# Patient Record
Sex: FEMALE | Race: WHITE | NOT HISPANIC OR LATINO | URBAN - METROPOLITAN AREA
[De-identification: names, ages, dates, MRNs, and addresses within clinical notes are randomized per-mention and may not be internally consistent; named-entity substitution may affect disease eponyms.]

---

## 2017-01-30 ENCOUNTER — OUTPATIENT (OUTPATIENT)
Dept: OUTPATIENT SERVICES | Facility: HOSPITAL | Age: 67
LOS: 1 days | End: 2017-01-30
Payer: MEDICARE

## 2017-01-30 ENCOUNTER — APPOINTMENT (OUTPATIENT)
Dept: RADIOLOGY | Facility: CLINIC | Age: 67
End: 2017-01-30

## 2017-01-30 DIAGNOSIS — Z00.8 ENCOUNTER FOR OTHER GENERAL EXAMINATION: ICD-10-CM

## 2017-01-30 PROCEDURE — 71020: CPT | Mod: 26

## 2017-01-30 PROCEDURE — 71046 X-RAY EXAM CHEST 2 VIEWS: CPT

## 2017-03-10 ENCOUNTER — APPOINTMENT (OUTPATIENT)
Dept: RADIOLOGY | Facility: CLINIC | Age: 67
End: 2017-03-10

## 2017-03-10 ENCOUNTER — OUTPATIENT (OUTPATIENT)
Dept: OUTPATIENT SERVICES | Facility: HOSPITAL | Age: 67
LOS: 1 days | End: 2017-03-10
Payer: MEDICARE

## 2017-03-10 DIAGNOSIS — Z00.8 ENCOUNTER FOR OTHER GENERAL EXAMINATION: ICD-10-CM

## 2017-03-10 PROCEDURE — 73030 X-RAY EXAM OF SHOULDER: CPT | Mod: 26,LT

## 2017-03-10 PROCEDURE — 73030 X-RAY EXAM OF SHOULDER: CPT

## 2017-09-12 ENCOUNTER — APPOINTMENT (OUTPATIENT)
Dept: GASTROENTEROLOGY | Facility: CLINIC | Age: 67
End: 2017-09-12
Payer: MEDICARE

## 2017-09-12 VITALS
SYSTOLIC BLOOD PRESSURE: 132 MMHG | RESPIRATION RATE: 16 BRPM | HEIGHT: 62 IN | BODY MASS INDEX: 30.73 KG/M2 | OXYGEN SATURATION: 100 % | WEIGHT: 167 LBS | HEART RATE: 77 BPM | DIASTOLIC BLOOD PRESSURE: 81 MMHG

## 2017-09-12 PROCEDURE — 99214 OFFICE O/P EST MOD 30 MIN: CPT

## 2017-10-01 ENCOUNTER — TRANSCRIPTION ENCOUNTER (OUTPATIENT)
Age: 67
End: 2017-10-01

## 2017-11-15 ENCOUNTER — APPOINTMENT (OUTPATIENT)
Dept: GASTROENTEROLOGY | Facility: CLINIC | Age: 67
End: 2017-11-15
Payer: MEDICARE

## 2017-11-15 VITALS
BODY MASS INDEX: 30.73 KG/M2 | HEIGHT: 62 IN | RESPIRATION RATE: 16 BRPM | SYSTOLIC BLOOD PRESSURE: 127 MMHG | WEIGHT: 167 LBS | HEART RATE: 74 BPM | DIASTOLIC BLOOD PRESSURE: 73 MMHG

## 2017-11-15 PROCEDURE — 99214 OFFICE O/P EST MOD 30 MIN: CPT

## 2017-12-14 ENCOUNTER — APPOINTMENT (OUTPATIENT)
Dept: CT IMAGING | Facility: CLINIC | Age: 67
End: 2017-12-14
Payer: MEDICARE

## 2017-12-14 ENCOUNTER — OUTPATIENT (OUTPATIENT)
Dept: OUTPATIENT SERVICES | Facility: HOSPITAL | Age: 67
LOS: 1 days | End: 2017-12-14
Payer: MEDICARE

## 2017-12-14 DIAGNOSIS — R10.13 EPIGASTRIC PAIN: ICD-10-CM

## 2017-12-14 PROCEDURE — 74177 CT ABD & PELVIS W/CONTRAST: CPT | Mod: 26

## 2017-12-14 PROCEDURE — 74177 CT ABD & PELVIS W/CONTRAST: CPT

## 2017-12-15 LAB
ALBUMIN SERPL ELPH-MCNC: 4.2 G/DL
ALP BLD-CCNC: 95 U/L
ALT SERPL-CCNC: 18 U/L
AMYLASE/CREAT SERPL: 59 U/L
ANION GAP SERPL CALC-SCNC: 13 MMOL/L
AST SERPL-CCNC: 23 U/L
BASOPHILS # BLD AUTO: 0.04 K/UL
BASOPHILS NFR BLD AUTO: 0.5 %
BILIRUB SERPL-MCNC: 0.4 MG/DL
BUN SERPL-MCNC: 15 MG/DL
CALCIUM SERPL-MCNC: 9.8 MG/DL
CHLORIDE SERPL-SCNC: 96 MMOL/L
CO2 SERPL-SCNC: 30 MMOL/L
CREAT SERPL-MCNC: 0.94 MG/DL
ENDOMYSIUM IGA SER QL: NEGATIVE
ENDOMYSIUM IGA TITR SER: NORMAL
EOSINOPHIL # BLD AUTO: 0.22 K/UL
EOSINOPHIL NFR BLD AUTO: 2.5 %
GLIADIN IGA SER QL: <5 UNITS
GLIADIN IGG SER QL: <5 UNITS
GLIADIN PEPTIDE IGA SER-ACNC: NEGATIVE
GLIADIN PEPTIDE IGG SER-ACNC: NEGATIVE
GLUCOSE SERPL-MCNC: 101 MG/DL
HCT VFR BLD CALC: 40.6 %
HGB BLD-MCNC: 12.9 G/DL
IGA SER QL IEP: 319 MG/DL
IMM GRANULOCYTES NFR BLD AUTO: 0.1 %
INR PPP: 0.95 RATIO
LPL SERPL-CCNC: 39 U/L
LYMPHOCYTES # BLD AUTO: 2.14 K/UL
LYMPHOCYTES NFR BLD AUTO: 24.5 %
MAN DIFF?: NORMAL
MCHC RBC-ENTMCNC: 29.5 PG
MCHC RBC-ENTMCNC: 31.8 GM/DL
MCV RBC AUTO: 92.9 FL
MONOCYTES # BLD AUTO: 0.83 K/UL
MONOCYTES NFR BLD AUTO: 9.5 %
NEUTROPHILS # BLD AUTO: 5.51 K/UL
NEUTROPHILS NFR BLD AUTO: 62.9 %
PLATELET # BLD AUTO: 247 K/UL
POTASSIUM SERPL-SCNC: 4.9 MMOL/L
PROT SERPL-MCNC: 7.5 G/DL
PT BLD: 10.7 SEC
RBC # BLD: 4.37 M/UL
RBC # FLD: 13.9 %
SODIUM SERPL-SCNC: 139 MMOL/L
TTG IGA SER IA-ACNC: 6.3 UNITS
TTG IGA SER-ACNC: NEGATIVE
TTG IGG SER IA-ACNC: 5.2 UNITS
TTG IGG SER IA-ACNC: NEGATIVE
WBC # FLD AUTO: 8.75 K/UL

## 2018-01-02 ENCOUNTER — APPOINTMENT (OUTPATIENT)
Dept: GASTROENTEROLOGY | Facility: GI CENTER | Age: 68
End: 2018-01-02
Payer: MEDICARE

## 2018-01-02 ENCOUNTER — OUTPATIENT (OUTPATIENT)
Dept: OUTPATIENT SERVICES | Facility: HOSPITAL | Age: 68
LOS: 1 days | End: 2018-01-02
Payer: MEDICARE

## 2018-01-02 ENCOUNTER — RESULT REVIEW (OUTPATIENT)
Age: 68
End: 2018-01-02

## 2018-01-02 DIAGNOSIS — R10.13 EPIGASTRIC PAIN: ICD-10-CM

## 2018-01-02 DIAGNOSIS — G89.29 EPIGASTRIC PAIN: ICD-10-CM

## 2018-01-02 DIAGNOSIS — K22.70 BARRETT'S ESOPHAGUS WITHOUT DYSPLASIA: ICD-10-CM

## 2018-01-02 PROCEDURE — 88305 TISSUE EXAM BY PATHOLOGIST: CPT | Mod: 26

## 2018-01-02 PROCEDURE — 43239 EGD BIOPSY SINGLE/MULTIPLE: CPT

## 2018-01-02 PROCEDURE — 88305 TISSUE EXAM BY PATHOLOGIST: CPT

## 2018-01-04 LAB — SURGICAL PATHOLOGY FINAL REPORT - CH: SIGNIFICANT CHANGE UP

## 2018-02-01 ENCOUNTER — APPOINTMENT (OUTPATIENT)
Dept: PULMONOLOGY | Facility: CLINIC | Age: 68
End: 2018-02-01
Payer: MEDICARE

## 2018-02-01 VITALS
SYSTOLIC BLOOD PRESSURE: 110 MMHG | HEART RATE: 81 BPM | BODY MASS INDEX: 30.54 KG/M2 | WEIGHT: 167 LBS | DIASTOLIC BLOOD PRESSURE: 80 MMHG | OXYGEN SATURATION: 97 %

## 2018-02-01 DIAGNOSIS — G93.3 POSTVIRAL FATIGUE SYNDROME: ICD-10-CM

## 2018-02-01 PROCEDURE — 94060 EVALUATION OF WHEEZING: CPT

## 2018-02-01 PROCEDURE — 99215 OFFICE O/P EST HI 40 MIN: CPT | Mod: 25

## 2018-02-01 PROCEDURE — 94664 DEMO&/EVAL PT USE INHALER: CPT | Mod: 59

## 2018-02-01 RX ORDER — RANITIDINE 150 MG/1
150 TABLET ORAL
Qty: 30 | Refills: 5 | Status: DISCONTINUED | COMMUNITY
Start: 2017-09-12 | End: 2018-02-01

## 2018-02-19 ENCOUNTER — FORM ENCOUNTER (OUTPATIENT)
Age: 68
End: 2018-02-19

## 2018-02-20 ENCOUNTER — OUTPATIENT (OUTPATIENT)
Dept: OUTPATIENT SERVICES | Facility: HOSPITAL | Age: 68
LOS: 1 days | End: 2018-02-20
Payer: MEDICARE

## 2018-02-20 ENCOUNTER — APPOINTMENT (OUTPATIENT)
Dept: CT IMAGING | Facility: CLINIC | Age: 68
End: 2018-02-20
Payer: MEDICARE

## 2018-02-20 DIAGNOSIS — Z00.8 ENCOUNTER FOR OTHER GENERAL EXAMINATION: ICD-10-CM

## 2018-02-20 DIAGNOSIS — R91.8 OTHER NONSPECIFIC ABNORMAL FINDING OF LUNG FIELD: ICD-10-CM

## 2018-02-20 PROCEDURE — G0297: CPT

## 2018-02-20 PROCEDURE — G0297: CPT | Mod: 26

## 2018-02-27 ENCOUNTER — APPOINTMENT (OUTPATIENT)
Dept: PULMONOLOGY | Facility: CLINIC | Age: 68
End: 2018-02-27
Payer: MEDICARE

## 2018-02-27 VITALS
RESPIRATION RATE: 16 BRPM | SYSTOLIC BLOOD PRESSURE: 148 MMHG | HEART RATE: 88 BPM | DIASTOLIC BLOOD PRESSURE: 78 MMHG | OXYGEN SATURATION: 97 %

## 2018-02-27 VITALS — WEIGHT: 164 LBS | BODY MASS INDEX: 30 KG/M2

## 2018-02-27 PROCEDURE — 94010 BREATHING CAPACITY TEST: CPT

## 2018-02-27 PROCEDURE — 99214 OFFICE O/P EST MOD 30 MIN: CPT | Mod: 25

## 2018-04-09 ENCOUNTER — APPOINTMENT (OUTPATIENT)
Dept: PULMONOLOGY | Facility: CLINIC | Age: 68
End: 2018-04-09
Payer: MEDICARE

## 2018-04-09 VITALS
HEIGHT: 62 IN | OXYGEN SATURATION: 98 % | SYSTOLIC BLOOD PRESSURE: 130 MMHG | DIASTOLIC BLOOD PRESSURE: 70 MMHG | BODY MASS INDEX: 30.18 KG/M2 | HEART RATE: 84 BPM

## 2018-04-09 VITALS — WEIGHT: 165 LBS | BODY MASS INDEX: 30.18 KG/M2

## 2018-04-09 DIAGNOSIS — C50.912 MALIGNANT NEOPLASM OF UNSPECIFIED SITE OF LEFT FEMALE BREAST: ICD-10-CM

## 2018-04-09 PROCEDURE — 94060 EVALUATION OF WHEEZING: CPT

## 2018-04-09 PROCEDURE — 94664 DEMO&/EVAL PT USE INHALER: CPT | Mod: 59

## 2018-04-09 PROCEDURE — 99214 OFFICE O/P EST MOD 30 MIN: CPT | Mod: 25

## 2018-04-10 ENCOUNTER — FORM ENCOUNTER (OUTPATIENT)
Age: 68
End: 2018-04-10

## 2018-04-11 ENCOUNTER — APPOINTMENT (OUTPATIENT)
Dept: CT IMAGING | Facility: CLINIC | Age: 68
End: 2018-04-11
Payer: MEDICARE

## 2018-04-11 ENCOUNTER — OUTPATIENT (OUTPATIENT)
Dept: OUTPATIENT SERVICES | Facility: HOSPITAL | Age: 68
LOS: 1 days | End: 2018-04-11
Payer: MEDICARE

## 2018-04-11 DIAGNOSIS — R06.09 OTHER FORMS OF DYSPNEA: ICD-10-CM

## 2018-04-11 DIAGNOSIS — R91.8 OTHER NONSPECIFIC ABNORMAL FINDING OF LUNG FIELD: ICD-10-CM

## 2018-04-11 PROCEDURE — 71275 CT ANGIOGRAPHY CHEST: CPT

## 2018-04-11 PROCEDURE — 71275 CT ANGIOGRAPHY CHEST: CPT | Mod: 26

## 2018-07-26 ENCOUNTER — APPOINTMENT (OUTPATIENT)
Dept: PULMONOLOGY | Facility: CLINIC | Age: 68
End: 2018-07-26

## 2018-10-02 ENCOUNTER — RX RENEWAL (OUTPATIENT)
Age: 68
End: 2018-10-02

## 2018-11-07 ENCOUNTER — APPOINTMENT (OUTPATIENT)
Dept: PULMONOLOGY | Facility: CLINIC | Age: 68
End: 2018-11-07
Payer: MEDICARE

## 2018-11-07 VITALS — OXYGEN SATURATION: 95 % | SYSTOLIC BLOOD PRESSURE: 120 MMHG | DIASTOLIC BLOOD PRESSURE: 70 MMHG | HEART RATE: 73 BPM

## 2018-11-07 PROCEDURE — 94664 DEMO&/EVAL PT USE INHALER: CPT | Mod: 59

## 2018-11-07 PROCEDURE — 94060 EVALUATION OF WHEEZING: CPT

## 2018-11-07 PROCEDURE — 99215 OFFICE O/P EST HI 40 MIN: CPT | Mod: 25

## 2018-11-29 ENCOUNTER — APPOINTMENT (OUTPATIENT)
Dept: GASTROENTEROLOGY | Facility: CLINIC | Age: 68
End: 2018-11-29

## 2018-12-18 ENCOUNTER — APPOINTMENT (OUTPATIENT)
Dept: PULMONOLOGY | Facility: CLINIC | Age: 68
End: 2018-12-18
Payer: MEDICARE

## 2018-12-18 VITALS — SYSTOLIC BLOOD PRESSURE: 132 MMHG | DIASTOLIC BLOOD PRESSURE: 70 MMHG | OXYGEN SATURATION: 97 % | HEART RATE: 68 BPM

## 2018-12-18 DIAGNOSIS — K22.719 BARRETT'S ESOPHAGUS WITH DYSPLASIA, UNSPECIFIED: ICD-10-CM

## 2018-12-18 DIAGNOSIS — Z87.891 PERSONAL HISTORY OF NICOTINE DEPENDENCE: ICD-10-CM

## 2018-12-18 DIAGNOSIS — J44.1 CHRONIC OBSTRUCTIVE PULMONARY DISEASE WITH (ACUTE) EXACERBATION: ICD-10-CM

## 2018-12-18 DIAGNOSIS — R91.8 OTHER NONSPECIFIC ABNORMAL FINDING OF LUNG FIELD: ICD-10-CM

## 2018-12-18 PROCEDURE — 99214 OFFICE O/P EST MOD 30 MIN: CPT | Mod: 25

## 2018-12-18 PROCEDURE — 94010 BREATHING CAPACITY TEST: CPT

## 2018-12-18 RX ORDER — TIOTROPIUM BROMIDE INHALATION SPRAY 3.12 UG/1
2.5 SPRAY, METERED RESPIRATORY (INHALATION)
Refills: 0 | Status: DISCONTINUED | COMMUNITY
End: 2018-12-18

## 2019-01-01 ENCOUNTER — APPOINTMENT (OUTPATIENT)
Dept: FAMILY MEDICINE | Facility: CLINIC | Age: 69
End: 2019-01-01
Payer: MEDICARE

## 2019-01-01 ENCOUNTER — EMERGENCY (EMERGENCY)
Facility: HOSPITAL | Age: 69
LOS: 1 days | Discharge: HOME | End: 2019-01-01
Attending: EMERGENCY MEDICINE
Payer: MEDICARE

## 2019-01-01 ENCOUNTER — APPOINTMENT (OUTPATIENT)
Dept: PULMONOLOGY | Facility: CLINIC | Age: 69
End: 2019-01-01
Payer: MEDICARE

## 2019-01-01 VITALS
OXYGEN SATURATION: 99 % | RESPIRATION RATE: 20 BRPM | HEART RATE: 80 BPM | SYSTOLIC BLOOD PRESSURE: 152 MMHG | WEIGHT: 138.89 LBS | HEIGHT: 62 IN | DIASTOLIC BLOOD PRESSURE: 82 MMHG | TEMPERATURE: 97 F

## 2019-01-01 VITALS
OXYGEN SATURATION: 98 % | HEIGHT: 62 IN | DIASTOLIC BLOOD PRESSURE: 62 MMHG | HEART RATE: 97 BPM | DIASTOLIC BLOOD PRESSURE: 82 MMHG | OXYGEN SATURATION: 95 % | SYSTOLIC BLOOD PRESSURE: 144 MMHG | TEMPERATURE: 97.7 F | RESPIRATION RATE: 16 BRPM | SYSTOLIC BLOOD PRESSURE: 116 MMHG | WEIGHT: 130 LBS | BODY MASS INDEX: 23.78 KG/M2 | HEART RATE: 78 BPM | BODY MASS INDEX: 24.29 KG/M2 | WEIGHT: 132 LBS

## 2019-01-01 VITALS
OXYGEN SATURATION: 98 % | HEIGHT: 62 IN | SYSTOLIC BLOOD PRESSURE: 152 MMHG | DIASTOLIC BLOOD PRESSURE: 81 MMHG | TEMPERATURE: 97.9 F | WEIGHT: 128 LBS | HEART RATE: 77 BPM | BODY MASS INDEX: 23.55 KG/M2

## 2019-01-01 DIAGNOSIS — Z87.898 PERSONAL HISTORY OF OTHER SPECIFIED CONDITIONS: ICD-10-CM

## 2019-01-01 DIAGNOSIS — Z01.818 ENCOUNTER FOR OTHER PREPROCEDURAL EXAMINATION: ICD-10-CM

## 2019-01-01 DIAGNOSIS — Z96.82 PRESENCE OF NEUROSTIMULATOR: ICD-10-CM

## 2019-01-01 PROCEDURE — 99283 EMERGENCY DEPT VISIT LOW MDM: CPT

## 2019-01-01 PROCEDURE — 99284 EMERGENCY DEPT VISIT MOD MDM: CPT | Mod: 25

## 2019-01-01 PROCEDURE — 96372 THER/PROPH/DIAG INJ SC/IM: CPT

## 2019-01-01 PROCEDURE — 99213 OFFICE O/P EST LOW 20 MIN: CPT

## 2019-01-01 PROCEDURE — 71101 X-RAY EXAM UNILAT RIBS/CHEST: CPT | Mod: 26

## 2019-01-01 PROCEDURE — 71101 X-RAY EXAM UNILAT RIBS/CHEST: CPT

## 2019-01-01 PROCEDURE — 99214 OFFICE O/P EST MOD 30 MIN: CPT

## 2019-01-01 RX ORDER — OXYCODONE AND ACETAMINOPHEN 5; 325 MG/1; MG/1
5-325 TABLET ORAL
Qty: 30 | Refills: 0 | Status: DISCONTINUED | COMMUNITY
Start: 2019-01-01 | End: 2019-01-01

## 2019-01-01 RX ORDER — OXYCODONE AND ACETAMINOPHEN 5; 325 MG/1; MG/1
1 TABLET ORAL ONCE
Refills: 0 | Status: DISCONTINUED | OUTPATIENT
Start: 2019-01-01 | End: 2019-01-01

## 2019-01-01 RX ORDER — MORPHINE SULFATE 50 MG/1
4 CAPSULE, EXTENDED RELEASE ORAL ONCE
Refills: 0 | Status: DISCONTINUED | OUTPATIENT
Start: 2019-01-01 | End: 2019-01-01

## 2019-01-01 RX ORDER — LIDOCAINE 4 G/100G
1 CREAM TOPICAL ONCE
Refills: 0 | Status: COMPLETED | OUTPATIENT
Start: 2019-01-01 | End: 2019-01-01

## 2019-01-01 RX ORDER — FLUTICASONE FUROATE AND VILANTEROL TRIFENATATE 100; 25 UG/1; UG/1
100-25 POWDER RESPIRATORY (INHALATION)
Qty: 3 | Refills: 1 | Status: DISCONTINUED | COMMUNITY
Start: 2018-02-27 | End: 2019-01-01

## 2019-01-01 RX ORDER — APIXABAN 5 MG/1
5 TABLET, FILM COATED ORAL
Qty: 60 | Refills: 3 | Status: ACTIVE | COMMUNITY
Start: 2019-01-01

## 2019-01-01 RX ADMIN — MORPHINE SULFATE 4 MILLIGRAM(S): 50 CAPSULE, EXTENDED RELEASE ORAL at 16:23

## 2019-01-01 RX ADMIN — LIDOCAINE 1 PATCH: 4 CREAM TOPICAL at 16:24

## 2019-01-01 RX ADMIN — OXYCODONE AND ACETAMINOPHEN 1 TABLET(S): 5; 325 TABLET ORAL at 18:13

## 2019-04-13 ENCOUNTER — EMERGENCY (EMERGENCY)
Facility: HOSPITAL | Age: 69
LOS: 1 days | Discharge: HOME | End: 2019-04-13
Attending: EMERGENCY MEDICINE
Payer: MEDICARE

## 2019-04-13 VITALS
SYSTOLIC BLOOD PRESSURE: 164 MMHG | DIASTOLIC BLOOD PRESSURE: 94 MMHG | OXYGEN SATURATION: 98 % | TEMPERATURE: 98 F | HEART RATE: 98 BPM | RESPIRATION RATE: 20 BRPM

## 2019-04-13 LAB
ALBUMIN SERPL ELPH-MCNC: 4.3 G/DL — SIGNIFICANT CHANGE UP (ref 3.3–5.2)
ALP SERPL-CCNC: 92 U/L — SIGNIFICANT CHANGE UP (ref 40–120)
ALT FLD-CCNC: 22 U/L — SIGNIFICANT CHANGE UP
ANION GAP SERPL CALC-SCNC: 13 MMOL/L — SIGNIFICANT CHANGE UP (ref 5–17)
APPEARANCE UR: CLEAR — SIGNIFICANT CHANGE UP
AST SERPL-CCNC: 31 U/L — SIGNIFICANT CHANGE UP
BACTERIA # UR AUTO: ABNORMAL
BASOPHILS # BLD AUTO: 0 K/UL — SIGNIFICANT CHANGE UP (ref 0–0.2)
BASOPHILS NFR BLD AUTO: 0.5 % — SIGNIFICANT CHANGE UP (ref 0–2)
BILIRUB SERPL-MCNC: 0.3 MG/DL — LOW (ref 0.4–2)
BILIRUB UR-MCNC: NEGATIVE — SIGNIFICANT CHANGE UP
BUN SERPL-MCNC: 12 MG/DL — SIGNIFICANT CHANGE UP (ref 8–20)
CALCIUM SERPL-MCNC: 9.5 MG/DL — SIGNIFICANT CHANGE UP (ref 8.6–10.2)
CHLORIDE SERPL-SCNC: 105 MMOL/L — SIGNIFICANT CHANGE UP (ref 98–107)
CO2 SERPL-SCNC: 25 MMOL/L — SIGNIFICANT CHANGE UP (ref 22–29)
COLOR SPEC: YELLOW — SIGNIFICANT CHANGE UP
CREAT SERPL-MCNC: 0.87 MG/DL — SIGNIFICANT CHANGE UP (ref 0.5–1.3)
DIFF PNL FLD: NEGATIVE — SIGNIFICANT CHANGE UP
EOSINOPHIL # BLD AUTO: 0.2 K/UL — SIGNIFICANT CHANGE UP (ref 0–0.5)
EOSINOPHIL NFR BLD AUTO: 1.6 % — SIGNIFICANT CHANGE UP (ref 0–6)
EPI CELLS # UR: SIGNIFICANT CHANGE UP
GLUCOSE SERPL-MCNC: 97 MG/DL — SIGNIFICANT CHANGE UP (ref 70–115)
GLUCOSE UR QL: NEGATIVE MG/DL — SIGNIFICANT CHANGE UP
HCT VFR BLD CALC: 42.9 % — SIGNIFICANT CHANGE UP (ref 37–47)
HGB BLD-MCNC: 13.6 G/DL — SIGNIFICANT CHANGE UP (ref 12–16)
KETONES UR-MCNC: NEGATIVE — SIGNIFICANT CHANGE UP
LEUKOCYTE ESTERASE UR-ACNC: ABNORMAL
LIDOCAIN IGE QN: 38 U/L — SIGNIFICANT CHANGE UP (ref 22–51)
LYMPHOCYTES # BLD AUTO: 2.8 K/UL — SIGNIFICANT CHANGE UP (ref 1–4.8)
LYMPHOCYTES # BLD AUTO: 25.5 % — SIGNIFICANT CHANGE UP (ref 20–55)
MCHC RBC-ENTMCNC: 29 PG — SIGNIFICANT CHANGE UP (ref 27–31)
MCHC RBC-ENTMCNC: 31.7 G/DL — LOW (ref 32–36)
MCV RBC AUTO: 91.5 FL — SIGNIFICANT CHANGE UP (ref 81–99)
MONOCYTES # BLD AUTO: 0.8 K/UL — SIGNIFICANT CHANGE UP (ref 0–0.8)
MONOCYTES NFR BLD AUTO: 6.9 % — SIGNIFICANT CHANGE UP (ref 3–10)
NEUTROPHILS # BLD AUTO: 7.2 K/UL — SIGNIFICANT CHANGE UP (ref 1.8–8)
NEUTROPHILS NFR BLD AUTO: 65.2 % — SIGNIFICANT CHANGE UP (ref 37–73)
NITRITE UR-MCNC: NEGATIVE — SIGNIFICANT CHANGE UP
PH UR: 7 — SIGNIFICANT CHANGE UP (ref 5–8)
PLATELET # BLD AUTO: 262 K/UL — SIGNIFICANT CHANGE UP (ref 150–400)
POTASSIUM SERPL-MCNC: 4.7 MMOL/L — SIGNIFICANT CHANGE UP (ref 3.5–5.3)
POTASSIUM SERPL-SCNC: 4.7 MMOL/L — SIGNIFICANT CHANGE UP (ref 3.5–5.3)
PROT SERPL-MCNC: 7.9 G/DL — SIGNIFICANT CHANGE UP (ref 6.6–8.7)
PROT UR-MCNC: NEGATIVE MG/DL — SIGNIFICANT CHANGE UP
RBC # BLD: 4.69 M/UL — SIGNIFICANT CHANGE UP (ref 4.4–5.2)
RBC # FLD: 13.8 % — SIGNIFICANT CHANGE UP (ref 11–15.6)
RBC CASTS # UR COMP ASSIST: SIGNIFICANT CHANGE UP /HPF (ref 0–4)
SODIUM SERPL-SCNC: 143 MMOL/L — SIGNIFICANT CHANGE UP (ref 135–145)
SP GR SPEC: 1.01 — SIGNIFICANT CHANGE UP (ref 1.01–1.02)
TROPONIN T SERPL-MCNC: <0.01 NG/ML — SIGNIFICANT CHANGE UP (ref 0–0.06)
UROBILINOGEN FLD QL: NEGATIVE MG/DL — SIGNIFICANT CHANGE UP
WBC # BLD: 11.1 K/UL — HIGH (ref 4.8–10.8)
WBC # FLD AUTO: 11.1 K/UL — HIGH (ref 4.8–10.8)
WBC UR QL: ABNORMAL

## 2019-04-13 PROCEDURE — 83690 ASSAY OF LIPASE: CPT

## 2019-04-13 PROCEDURE — 99284 EMERGENCY DEPT VISIT MOD MDM: CPT

## 2019-04-13 PROCEDURE — 80053 COMPREHEN METABOLIC PANEL: CPT

## 2019-04-13 PROCEDURE — 84484 ASSAY OF TROPONIN QUANT: CPT

## 2019-04-13 PROCEDURE — 96374 THER/PROPH/DIAG INJ IV PUSH: CPT

## 2019-04-13 PROCEDURE — 85027 COMPLETE CBC AUTOMATED: CPT

## 2019-04-13 PROCEDURE — 93010 ELECTROCARDIOGRAM REPORT: CPT

## 2019-04-13 PROCEDURE — 99284 EMERGENCY DEPT VISIT MOD MDM: CPT | Mod: 25

## 2019-04-13 PROCEDURE — 36415 COLL VENOUS BLD VENIPUNCTURE: CPT

## 2019-04-13 PROCEDURE — 81001 URINALYSIS AUTO W/SCOPE: CPT

## 2019-04-13 PROCEDURE — 93005 ELECTROCARDIOGRAM TRACING: CPT

## 2019-04-13 PROCEDURE — 87086 URINE CULTURE/COLONY COUNT: CPT

## 2019-04-13 RX ORDER — KETOROLAC TROMETHAMINE 30 MG/ML
30 SYRINGE (ML) INJECTION ONCE
Qty: 0 | Refills: 0 | Status: DISCONTINUED | OUTPATIENT
Start: 2019-04-13 | End: 2019-04-13

## 2019-04-13 RX ORDER — CEPHALEXIN 500 MG
500 CAPSULE ORAL ONCE
Qty: 0 | Refills: 0 | Status: COMPLETED | OUTPATIENT
Start: 2019-04-13 | End: 2019-04-13

## 2019-04-13 RX ORDER — CEPHALEXIN 500 MG
1 CAPSULE ORAL
Qty: 20 | Refills: 0
Start: 2019-04-13 | End: 2019-04-17

## 2019-04-13 RX ADMIN — Medication 30 MILLIGRAM(S): at 20:25

## 2019-04-13 RX ADMIN — Medication 500 MILLIGRAM(S): at 21:40

## 2019-04-13 NOTE — ED ADULT TRIAGE NOTE - CHIEF COMPLAINT QUOTE
Pt c/o epigastric/chest pain x1 week that radiates to abdomen and back, pt reports generalized aches and N/V with decreased appetitie and reports chills and night sweats.

## 2019-04-13 NOTE — ED STATDOCS - CLINICAL SUMMARY MEDICAL DECISION MAKING FREE TEXT BOX
I, Clara Gill, performed the initial face to face bedside interview with this patient regarding history of present illness and determined that the patient should be seen in the main ED.  The history, was documented by the scribe in my presence and I attest to the accuracy of the documentation.

## 2019-04-13 NOTE — ED ADULT NURSE REASSESSMENT NOTE - NS ED NURSE REASSESS COMMENT FT1
patient and family spoke with supervision satisfied with support, and care, discharged home antibiotic given

## 2019-04-13 NOTE — ED PROVIDER NOTE - GASTROINTESTINAL, MLM
Abdomen soft, Tenderness to b/l flank and epigastric regions, no guarding, rebound or rigidity. Abdomen soft, b/l flank tenderness, no rebound, rigidity or guarding.

## 2019-04-13 NOTE — ED ADULT NURSE NOTE - OBJECTIVE STATEMENT
patient with abdominal pain x 1 week with nausea getting worse,  yesterday + BM normal, states today feels full, patient given medication and labs drawn patient given emotional support, patient also has HX of shingles red dots on right lower back without drainage or crusting- closed states pain to site, MD Carrero states no shingles at this time isolation off/ DC

## 2019-04-13 NOTE — ED PROVIDER NOTE - OBJECTIVE STATEMENT
69 y.o F presents with b/l lower flank pain x1 week described as constant, achy, and is mildly aggravated with movement. denies fever. denies HA or neck pain. no chest pain or sob. no abd pain. no n/v/d. no urinary f/u/d. no back pain. no motor or sensory deficits. denies illicit drug use. no recent travel. no rash. no other acute issues symptoms or concerns 69 y.o F with hx of appendectomy and cholecystectomy presents to ED with b/l lower flank pain x1 week described as constant, achy, and is mildly aggravated with movement. denies fever. denies HA or neck pain. no chest pain or sob. no abd pain. no n/v/d. no urinary f/u/d. no back pain. no motor or sensory deficits. denies illicit drug use. no recent travel. no rash. no other acute issues symptoms or concerns

## 2019-04-13 NOTE — ED PROVIDER NOTE - CLINICAL SUMMARY MEDICAL DECISION MAKING FREE TEXT BOX
pain controlled in nad in room no sing shingles lesions on back concerns addressed with nursing manager abx for uti f.u pcp return for intractable pain or any overall worsening. labs porvided to pt and herself and they are in agreement with plan of care

## 2019-04-15 LAB
CULTURE RESULTS: SIGNIFICANT CHANGE UP
SPECIMEN SOURCE: SIGNIFICANT CHANGE UP

## 2019-04-16 ENCOUNTER — APPOINTMENT (OUTPATIENT)
Dept: GASTROENTEROLOGY | Facility: CLINIC | Age: 69
End: 2019-04-16
Payer: MEDICAID

## 2019-04-16 VITALS
RESPIRATION RATE: 16 BRPM | DIASTOLIC BLOOD PRESSURE: 84 MMHG | HEIGHT: 62 IN | SYSTOLIC BLOOD PRESSURE: 138 MMHG | HEART RATE: 64 BPM | OXYGEN SATURATION: 98 % | BODY MASS INDEX: 30.36 KG/M2 | WEIGHT: 165 LBS

## 2019-04-16 DIAGNOSIS — Z80.9 FAMILY HISTORY OF MALIGNANT NEOPLASM, UNSPECIFIED: ICD-10-CM

## 2019-04-16 PROCEDURE — 99213 OFFICE O/P EST LOW 20 MIN: CPT

## 2019-04-18 NOTE — HISTORY OF PRESENT ILLNESS
[de-identified] : This is a 69 year old woman with periumbilical pain. She states its a burning pain. She is on omeprazole twice daily with little improvement.  The Zantac was not helping.  The pain starts in the upper abdomen and radiates to her back. She denies nausea, vomiting and weight loss.   She had an EGD last year with Dr. Grove which was normal.

## 2019-04-18 NOTE — PHYSICAL EXAM
[General Appearance - Alert] : alert [General Appearance - In No Acute Distress] : in no acute distress [Sclera] : the sclera and conjunctiva were normal [PERRL With Normal Accommodation] : pupils were equal in size, round, and reactive to light [Outer Ear] : the ears and nose were normal in appearance [Extraocular Movements] : extraocular movements were intact [Oropharynx] : the oropharynx was normal [Neck Appearance] : the appearance of the neck was normal [Neck Cervical Mass (___cm)] : no neck mass was observed [Jugular Venous Distention Increased] : there was no jugular-venous distention [Thyroid Diffuse Enlargement] : the thyroid was not enlarged [Thyroid Nodule] : there were no palpable thyroid nodules [Auscultation Breath Sounds / Voice Sounds] : lungs were clear to auscultation bilaterally [Heart Rate And Rhythm] : heart rate was normal and rhythm regular [Heart Sounds] : normal S1 and S2 [Heart Sounds Gallop] : no gallops [Murmurs] : no murmurs [Heart Sounds Pericardial Friction Rub] : no pericardial rub [Abdomen Soft] : soft [Bowel Sounds] : normal bowel sounds [Abdomen Tenderness] : non-tender [Abdomen Mass (___ Cm)] : no abdominal mass palpated [No CVA Tenderness] : no ~M costovertebral angle tenderness [No Spinal Tenderness] : no spinal tenderness [Skin Color & Pigmentation] : normal skin color and pigmentation [Skin Turgor] : normal skin turgor [Oriented To Time, Place, And Person] : oriented to person, place, and time [Impaired Insight] : insight and judgment were intact [] : no rash [Affect] : the affect was normal

## 2019-04-30 ENCOUNTER — APPOINTMENT (OUTPATIENT)
Dept: GASTROENTEROLOGY | Facility: CLINIC | Age: 69
End: 2019-04-30
Payer: MEDICARE

## 2019-04-30 VITALS
OXYGEN SATURATION: 98 % | WEIGHT: 168 LBS | SYSTOLIC BLOOD PRESSURE: 155 MMHG | HEIGHT: 62 IN | DIASTOLIC BLOOD PRESSURE: 79 MMHG | RESPIRATION RATE: 15 BRPM | BODY MASS INDEX: 30.91 KG/M2

## 2019-04-30 PROCEDURE — 99213 OFFICE O/P EST LOW 20 MIN: CPT

## 2019-04-30 NOTE — PHYSICAL EXAM
[General Appearance - Alert] : alert [General Appearance - In No Acute Distress] : in no acute distress [Sclera] : the sclera and conjunctiva were normal [PERRL With Normal Accommodation] : pupils were equal in size, round, and reactive to light [Extraocular Movements] : extraocular movements were intact [Outer Ear] : the ears and nose were normal in appearance [Oropharynx] : the oropharynx was normal [Neck Appearance] : the appearance of the neck was normal [Neck Cervical Mass (___cm)] : no neck mass was observed [Jugular Venous Distention Increased] : there was no jugular-venous distention [Thyroid Diffuse Enlargement] : the thyroid was not enlarged [Thyroid Nodule] : there were no palpable thyroid nodules [Auscultation Breath Sounds / Voice Sounds] : lungs were clear to auscultation bilaterally [Heart Rate And Rhythm] : heart rate was normal and rhythm regular [Heart Sounds] : normal S1 and S2 [Heart Sounds Gallop] : no gallops [Murmurs] : no murmurs [Heart Sounds Pericardial Friction Rub] : no pericardial rub [Bowel Sounds] : normal bowel sounds [Abdomen Soft] : soft [Abdomen Tenderness] : non-tender [Abdomen Mass (___ Cm)] : no abdominal mass palpated [No CVA Tenderness] : no ~M costovertebral angle tenderness [No Spinal Tenderness] : no spinal tenderness [Skin Color & Pigmentation] : normal skin color and pigmentation [Skin Turgor] : normal skin turgor [] : no rash [Oriented To Time, Place, And Person] : oriented to person, place, and time [Impaired Insight] : insight and judgment were intact [Affect] : the affect was normal

## 2019-04-30 NOTE — HISTORY OF PRESENT ILLNESS
[de-identified] : This is a 69 year old woman with periumbilical pain. She states its a burning pain. She is on omeprazole twice daily with little improvement.  The Zantac was not helping.  The pain starts in the upper abdomen and radiates to her back. She denies nausea, vomiting and weight loss.   She had an EGD last year with Dr. Grove which was normal. CT abdomen and pelvis in 2017 was unremarkable except for mild biliary dilation attributed to postcholecystectomy state. \par \par Follow up \par She tried the sucralfate without much benefit. She thinks her reflux is better but the periumbilical pain has persisted. It is now radiating to the RLQ. It can get very severe so she went to the [facility] ER and a CT abdomen and pelvis was unremarkable except for mild biliary dilation. Her weight is stable. She denies constipation and rectal bleeding. She had a colonoscopy 8 years ago in [place]. She is very worried because this is different pain then she has ever had.

## 2019-04-30 NOTE — ASSESSMENT
[FreeTextEntry1] : 69 year old woman with GERD and periumbilical pain that radiates to the RLQ.  Unclea andr etiology of her pain I will rule out PUD, gastritis and malignancy. The pain may be functional abdominal pain but given the severity of her symptoms I would like to rule those entities out. I will start her on bentyl  10 mg TID and schedule an egd and colonoscopy.

## 2019-05-03 ENCOUNTER — APPOINTMENT (OUTPATIENT)
Dept: OBGYN | Facility: CLINIC | Age: 69
End: 2019-05-03
Payer: MEDICARE

## 2019-05-03 ENCOUNTER — RESULT CHARGE (OUTPATIENT)
Age: 69
End: 2019-05-03

## 2019-05-03 VITALS
SYSTOLIC BLOOD PRESSURE: 124 MMHG | WEIGHT: 167 LBS | BODY MASS INDEX: 31.53 KG/M2 | DIASTOLIC BLOOD PRESSURE: 92 MMHG | HEIGHT: 61 IN

## 2019-05-03 DIAGNOSIS — N64.4 MASTODYNIA: ICD-10-CM

## 2019-05-03 DIAGNOSIS — Z12.31 ENCOUNTER FOR SCREENING MAMMOGRAM FOR MALIGNANT NEOPLASM OF BREAST: ICD-10-CM

## 2019-05-03 DIAGNOSIS — R10.9 UNSPECIFIED ABDOMINAL PAIN: ICD-10-CM

## 2019-05-03 DIAGNOSIS — Z80.3 FAMILY HISTORY OF MALIGNANT NEOPLASM OF BREAST: ICD-10-CM

## 2019-05-03 DIAGNOSIS — Z86.59 PERSONAL HISTORY OF OTHER MENTAL AND BEHAVIORAL DISORDERS: ICD-10-CM

## 2019-05-03 DIAGNOSIS — Z01.419 ENCOUNTER FOR GYNECOLOGICAL EXAMINATION (GENERAL) (ROUTINE) W/OUT ABNORMAL FINDINGS: ICD-10-CM

## 2019-05-03 DIAGNOSIS — Z78.9 OTHER SPECIFIED HEALTH STATUS: ICD-10-CM

## 2019-05-03 LAB
BILIRUB UR QL STRIP: NORMAL
GLUCOSE UR-MCNC: NORMAL
HCG UR QL: 0.2 EU/DL
HGB UR QL STRIP.AUTO: NORMAL
KETONES UR-MCNC: NORMAL
LEUKOCYTE ESTERASE UR QL STRIP: ABNORMAL
NITRITE UR QL STRIP: NORMAL
PH UR STRIP: 6.5
PROT UR STRIP-MCNC: NORMAL
SP GR UR STRIP: 1.01

## 2019-05-03 PROCEDURE — 36415 COLL VENOUS BLD VENIPUNCTURE: CPT

## 2019-05-03 PROCEDURE — 82270 OCCULT BLOOD FECES: CPT

## 2019-05-03 PROCEDURE — 81003 URINALYSIS AUTO W/O SCOPE: CPT | Mod: QW

## 2019-05-03 PROCEDURE — G0101: CPT

## 2019-05-03 PROCEDURE — 99213 OFFICE O/P EST LOW 20 MIN: CPT | Mod: 25

## 2019-05-03 RX ORDER — UBIDECARENONE/VIT E ACET 100MG-5
1000 CAPSULE ORAL
Refills: 0 | Status: DISCONTINUED | COMMUNITY
End: 2019-05-03

## 2019-05-03 NOTE — REVIEW OF SYSTEMS
[Recent ___ Lb Weight Loss] : recent [unfilled] ~Ulb weight loss [Feeling Tired] : feeling tired [Chills] : chills [Pelvic Pain] : pelvic pain [Nl] : Musculoskeletal

## 2019-05-03 NOTE — PHYSICAL EXAM
[Awake] : awake [Alert] : alert [Acute Distress] : no acute distress [Mass] : no breast mass [Tender] : tenderness [Nipple Discharge] : no nipple discharge [Axillary LAD] : no axillary lymphadenopathy [No Lesions] : no genitalia lesions [Vulvar Atrophy] : vulvar atrophy [Labia Majora Erythema] : no erythema of the labia majora [Labia Minora Erythema] : no erythema of the labia minora [Normal] : uterus [Labia Majora] : labia major [Labia Minora] : labia minora [Pink Rugae] : pink rugae [Atrophy] : atrophy [Cystocele] : a cystocele [Discharge] : a  ~M vaginal discharge was present [Scant] : scant [Foul Smelling] : not foul smelling [Clear] : clear [Thin] : thin [No Bleeding] : there was no active vaginal bleeding [Pap Obtained] : a Pap smear was performed [Dilated] : the cervix was not dilated [Motion Tenderness] : there was no cervical motion tenderness [Normal Position] : in a normal position [Tenderness] : nontender [Mass ___ cm] : no uterine mass was palpated [Enlarged ___ wks] : not enlarged [Uterine Adnexae] : were not tender and not enlarged [Adnexa Tenderness] : were not tender [Ovarian Mass (___ Cm)] : there were no adnexal masses

## 2019-05-03 NOTE — HISTORY OF PRESENT ILLNESS
[___ Year(s) Ago] : [unfilled] year(s) ago [Poor] : being in poor health [Healthy Diet] : a healthy diet [Regular Exercise] : regular exercise [Last Pap ___] : Last cervical pap smear was [unfilled] [Last Mammogram ___] : last mammogram done [unfilled] [Last Colonoscopy ___] : last colonoscopy done [unfilled] [Postmenopausal] : is postmenopausal [Full Term ___] : [unfilled] [Total Preg ___] : [unfilled] [Living ___] : [unfilled] [Periumbilical] : perimbilical area [Lower-Rt-Q] : lower right quadrant [Sharp] : sharp [Continuous] : continuous [___ Weeks] : started [unfilled] weeks ago [7/10] : is 7/10 in severity [Nausea] : nausea [Pelvic Pressure] : pelvic pressure [Activity] : worsened by activity [Rest] : improved by rest [Emotional Stress] : worsened by emotional stress [Lifting] : worsened by lifting [Movement] : worsened by movement [Definite:  ___ (Date)] : the last menstrual period was [unfilled] [Menarche Age: ____] : age at menarche was [unfilled] [Post-Menopause, No Sxs] : post-menopausal, currently without symptoms [No] : no [Male ___] : [unfilled] male [Female ___] : [unfilled] female [Pregnancy History] : denies prior pregnancies [Dull] : no dull [Stabbing] : no stabbing [Throbbing] : no throbbing [Fever] : no fever [Vomiting] : no vomiting [Diarrhea] : no diarrhea [Vaginal Discharge] : no vaginal discharge [Vaginal Bleeding] : no vaginal bleeding [Dysuria] : no dysuria [Pain with BMs] : no pain with bowel movements [Dysmenorrhea] : no dysmenorrhea [Non-opiod Analgesic] : not improved by non-opiod analgesic [Defecation] : not worsened by defecation [Snead] : not worsened by intercourse [Menses] : not worsened by menses [Urination] : not worsened by urination [New Sexual Partner] : no new sexual partners [Pelvic Trauma] : no pelvic trauma [STDs] : no sexually transmitted disease [Current IUD] : not currently using an IUD [Previous IUD] : no history of IUD use [Burning] : no burning [Itching] : no itching [Mass] : no mass [Stinging] : no stinging [Lesion] : no lesion [Soreness] : no soreness [Discharge] : no discharge [Localized Pain] : no localized pain [Mass (___cm)] : no palpable mass [Diffused Pain] : no diffused pain [Nipple Discharge] : no nipple discharge [Skin Color Change] : no skin color change [Hot Flashes] : no hot flashes [Night Sweats] : no night sweats [Vaginal Itching] : no vaginal itching [Sexually Active] : is not sexually active [Monogamous] : is not monogamous [Contraception] : does not use contraception

## 2019-05-04 ENCOUNTER — APPOINTMENT (OUTPATIENT)
Dept: OBGYN | Facility: CLINIC | Age: 69
End: 2019-05-04

## 2019-05-13 ENCOUNTER — MEDICATION RENEWAL (OUTPATIENT)
Age: 69
End: 2019-05-13

## 2019-05-14 ENCOUNTER — APPOINTMENT (OUTPATIENT)
Dept: OBGYN | Facility: CLINIC | Age: 69
End: 2019-05-14
Payer: MEDICARE

## 2019-05-14 VITALS
HEIGHT: 61 IN | DIASTOLIC BLOOD PRESSURE: 70 MMHG | WEIGHT: 167 LBS | SYSTOLIC BLOOD PRESSURE: 116 MMHG | BODY MASS INDEX: 31.53 KG/M2

## 2019-05-14 LAB
APPEARANCE: CLEAR
BACTERIA UR CULT: NORMAL
BACTERIA: NEGATIVE
BILIRUBIN URINE: NEGATIVE
BLOOD URINE: NEGATIVE
C TRACH RRNA SPEC QL NAA+PROBE: NOT DETECTED
CANDIDA VAG CYTO: NOT DETECTED
COLOR: NORMAL
CYTOLOGY CVX/VAG DOC THIN PREP: NORMAL
DATE COLLECTED: NORMAL
G VAGINALIS+PREV SP MTYP VAG QL MICRO: NOT DETECTED
GLUCOSE QUALITATIVE U: NEGATIVE
HAV IGM SER QL: NONREACTIVE
HBV CORE IGM SER QL: NONREACTIVE
HBV SURFACE AG SER QL: NONREACTIVE
HCV AB SER QL: NONREACTIVE
HCV S/CO RATIO: 0.14 S/CO
HEMOCCULT SP1 STL QL: NEGATIVE
HIV1+2 AB SPEC QL IA.RAPID: NONREACTIVE
HPV HIGH+LOW RISK DNA PNL CVX: NOT DETECTED
HSV 1+2 IGG SER IA-IMP: NEGATIVE
HSV 1+2 IGG SER IA-IMP: POSITIVE
HSV1 IGG SER QL: 51.7 INDEX
HSV2 IGG SER QL: 0.11 INDEX
HYALINE CASTS: 1 /LPF
KETONES URINE: NEGATIVE
LEUKOCYTE ESTERASE URINE: ABNORMAL
MICROSCOPIC-UA: NORMAL
N GONORRHOEA RRNA SPEC QL NAA+PROBE: NOT DETECTED
NITRITE URINE: NEGATIVE
PH URINE: 6.5
PROTEIN URINE: NEGATIVE
QUALITY CONTROL: YES
RED BLOOD CELLS URINE: 2 /HPF
SOURCE AMPLIFICATION: NORMAL
SOURCE TP AMPLIFICATION: NORMAL
SPECIFIC GRAVITY URINE: 1.01
SQUAMOUS EPITHELIAL CELLS: 5 /HPF
T PALLIDUM AB SER QL IA: NEGATIVE
T VAGINALIS RRNA SPEC QL NAA+PROBE: NOT DETECTED
T VAGINALIS VAG QL WET PREP: NOT DETECTED
UROBILINOGEN URINE: NORMAL
WHITE BLOOD CELLS URINE: 7 /HPF

## 2019-05-14 PROCEDURE — XXXXX: CPT

## 2019-05-14 PROCEDURE — 76830 TRANSVAGINAL US NON-OB: CPT

## 2019-05-14 NOTE — HISTORY OF PRESENT ILLNESS
[Good] : being in good health [Healthy Diet] : a healthy diet [Regular Exercise] : not exercising regularly [Weight Concerns] : no concerns with her weight [Last Mammogram ___] : Last Mammogram was [unfilled] [Last Colonoscopy ___] : Last colonoscopy [unfilled] [Last Pap ___] : Last cervical pap smear was [unfilled] [Postmenopausal] : is postmenopausal [unknown] : the patient is unsure of the date of her LMP [Menarche Age: ____] : age at menarche was [unfilled] [Sexually Active] : is not sexually active

## 2019-05-15 ENCOUNTER — APPOINTMENT (OUTPATIENT)
Dept: GASTROENTEROLOGY | Facility: GI CENTER | Age: 69
End: 2019-05-15

## 2019-05-15 ENCOUNTER — EMERGENCY (EMERGENCY)
Facility: HOSPITAL | Age: 69
LOS: 1 days | Discharge: HOME | End: 2019-05-15
Attending: EMERGENCY MEDICINE
Payer: MEDICARE

## 2019-05-15 VITALS
OXYGEN SATURATION: 96 % | DIASTOLIC BLOOD PRESSURE: 78 MMHG | HEART RATE: 154 BPM | RESPIRATION RATE: 18 BRPM | TEMPERATURE: 98 F | HEIGHT: 62 IN | SYSTOLIC BLOOD PRESSURE: 124 MMHG | WEIGHT: 162.04 LBS

## 2019-05-15 VITALS
DIASTOLIC BLOOD PRESSURE: 56 MMHG | OXYGEN SATURATION: 95 % | RESPIRATION RATE: 14 BRPM | SYSTOLIC BLOOD PRESSURE: 122 MMHG | HEART RATE: 97 BPM

## 2019-05-15 LAB
ALBUMIN SERPL ELPH-MCNC: 4 G/DL — SIGNIFICANT CHANGE UP (ref 3.3–5.2)
ALP SERPL-CCNC: 83 U/L — SIGNIFICANT CHANGE UP (ref 40–120)
ALT FLD-CCNC: 15 U/L — SIGNIFICANT CHANGE UP
ANION GAP SERPL CALC-SCNC: 13 MMOL/L — SIGNIFICANT CHANGE UP (ref 5–17)
APTT BLD: 30.9 SEC — SIGNIFICANT CHANGE UP (ref 27.5–36.3)
AST SERPL-CCNC: 23 U/L — SIGNIFICANT CHANGE UP
BASOPHILS # BLD AUTO: 0 K/UL — SIGNIFICANT CHANGE UP (ref 0–0.2)
BASOPHILS NFR BLD AUTO: 0.5 % — SIGNIFICANT CHANGE UP (ref 0–2)
BILIRUB SERPL-MCNC: 0.3 MG/DL — LOW (ref 0.4–2)
BUN SERPL-MCNC: 18 MG/DL — SIGNIFICANT CHANGE UP (ref 8–20)
CALCIUM SERPL-MCNC: 9.5 MG/DL — SIGNIFICANT CHANGE UP (ref 8.6–10.2)
CHLORIDE SERPL-SCNC: 108 MMOL/L — HIGH (ref 98–107)
CO2 SERPL-SCNC: 23 MMOL/L — SIGNIFICANT CHANGE UP (ref 22–29)
CREAT SERPL-MCNC: 0.84 MG/DL — SIGNIFICANT CHANGE UP (ref 0.5–1.3)
EOSINOPHIL # BLD AUTO: 0.1 K/UL — SIGNIFICANT CHANGE UP (ref 0–0.5)
EOSINOPHIL NFR BLD AUTO: 1.7 % — SIGNIFICANT CHANGE UP (ref 0–6)
GLUCOSE SERPL-MCNC: 80 MG/DL — SIGNIFICANT CHANGE UP (ref 70–115)
HCT VFR BLD CALC: 40 % — SIGNIFICANT CHANGE UP (ref 37–47)
HGB BLD-MCNC: 13 G/DL — SIGNIFICANT CHANGE UP (ref 12–16)
INR BLD: 1.06 RATIO — SIGNIFICANT CHANGE UP (ref 0.88–1.16)
LACTATE BLDV-MCNC: 2 MMOL/L — SIGNIFICANT CHANGE UP (ref 0.5–2)
LYMPHOCYTES # BLD AUTO: 2.5 K/UL — SIGNIFICANT CHANGE UP (ref 1–4.8)
LYMPHOCYTES # BLD AUTO: 30.1 % — SIGNIFICANT CHANGE UP (ref 20–55)
MAGNESIUM SERPL-MCNC: 1.9 MG/DL — SIGNIFICANT CHANGE UP (ref 1.6–2.6)
MCHC RBC-ENTMCNC: 29.5 PG — SIGNIFICANT CHANGE UP (ref 27–31)
MCHC RBC-ENTMCNC: 32.5 G/DL — SIGNIFICANT CHANGE UP (ref 32–36)
MCV RBC AUTO: 90.7 FL — SIGNIFICANT CHANGE UP (ref 81–99)
MONOCYTES # BLD AUTO: 0.9 K/UL — HIGH (ref 0–0.8)
MONOCYTES NFR BLD AUTO: 10.2 % — HIGH (ref 3–10)
NEUTROPHILS # BLD AUTO: 4.8 K/UL — SIGNIFICANT CHANGE UP (ref 1.8–8)
NEUTROPHILS NFR BLD AUTO: 57.4 % — SIGNIFICANT CHANGE UP (ref 37–73)
NT-PROBNP SERPL-SCNC: 550 PG/ML — HIGH (ref 0–300)
PLATELET # BLD AUTO: 234 K/UL — SIGNIFICANT CHANGE UP (ref 150–400)
POTASSIUM SERPL-MCNC: 4.8 MMOL/L — SIGNIFICANT CHANGE UP (ref 3.5–5.3)
POTASSIUM SERPL-SCNC: 4.8 MMOL/L — SIGNIFICANT CHANGE UP (ref 3.5–5.3)
PROT SERPL-MCNC: 7.2 G/DL — SIGNIFICANT CHANGE UP (ref 6.6–8.7)
PROTHROM AB SERPL-ACNC: 12.2 SEC — SIGNIFICANT CHANGE UP (ref 10–12.9)
RBC # BLD: 4.41 M/UL — SIGNIFICANT CHANGE UP (ref 4.4–5.2)
RBC # FLD: 13.4 % — SIGNIFICANT CHANGE UP (ref 11–15.6)
SODIUM SERPL-SCNC: 144 MMOL/L — SIGNIFICANT CHANGE UP (ref 135–145)
TROPONIN T SERPL-MCNC: <0.01 NG/ML — SIGNIFICANT CHANGE UP (ref 0–0.06)
WBC # BLD: 8.4 K/UL — SIGNIFICANT CHANGE UP (ref 4.8–10.8)
WBC # FLD AUTO: 8.4 K/UL — SIGNIFICANT CHANGE UP (ref 4.8–10.8)

## 2019-05-15 PROCEDURE — 93010 ELECTROCARDIOGRAM REPORT: CPT

## 2019-05-15 PROCEDURE — 93005 ELECTROCARDIOGRAM TRACING: CPT

## 2019-05-15 PROCEDURE — 85027 COMPLETE CBC AUTOMATED: CPT

## 2019-05-15 PROCEDURE — 85610 PROTHROMBIN TIME: CPT

## 2019-05-15 PROCEDURE — 99291 CRITICAL CARE FIRST HOUR: CPT | Mod: 25

## 2019-05-15 PROCEDURE — 71045 X-RAY EXAM CHEST 1 VIEW: CPT

## 2019-05-15 PROCEDURE — 83880 ASSAY OF NATRIURETIC PEPTIDE: CPT

## 2019-05-15 PROCEDURE — 96375 TX/PRO/DX INJ NEW DRUG ADDON: CPT

## 2019-05-15 PROCEDURE — 85730 THROMBOPLASTIN TIME PARTIAL: CPT

## 2019-05-15 PROCEDURE — 99291 CRITICAL CARE FIRST HOUR: CPT

## 2019-05-15 PROCEDURE — 80053 COMPREHEN METABOLIC PANEL: CPT

## 2019-05-15 PROCEDURE — 84484 ASSAY OF TROPONIN QUANT: CPT

## 2019-05-15 PROCEDURE — 83605 ASSAY OF LACTIC ACID: CPT

## 2019-05-15 PROCEDURE — 71045 X-RAY EXAM CHEST 1 VIEW: CPT | Mod: 26

## 2019-05-15 PROCEDURE — 83690 ASSAY OF LIPASE: CPT

## 2019-05-15 PROCEDURE — 96374 THER/PROPH/DIAG INJ IV PUSH: CPT

## 2019-05-15 PROCEDURE — 83735 ASSAY OF MAGNESIUM: CPT

## 2019-05-15 PROCEDURE — 36415 COLL VENOUS BLD VENIPUNCTURE: CPT

## 2019-05-15 RX ORDER — METOPROLOL TARTRATE 50 MG
50 TABLET ORAL DAILY
Refills: 0 | Status: DISCONTINUED | OUTPATIENT
Start: 2019-05-16 | End: 2019-05-20

## 2019-05-15 RX ORDER — DILTIAZEM HCL 120 MG
20 CAPSULE, EXT RELEASE 24 HR ORAL ONCE
Refills: 0 | Status: COMPLETED | OUTPATIENT
Start: 2019-05-15 | End: 2019-05-15

## 2019-05-15 RX ORDER — FLECAINIDE ACETATE 50 MG
50 TABLET ORAL
Refills: 0 | Status: DISCONTINUED | OUTPATIENT
Start: 2019-05-15 | End: 2019-05-20

## 2019-05-15 RX ORDER — SODIUM CHLORIDE 9 MG/ML
1000 INJECTION INTRAMUSCULAR; INTRAVENOUS; SUBCUTANEOUS ONCE
Refills: 0 | Status: COMPLETED | OUTPATIENT
Start: 2019-05-15 | End: 2019-05-15

## 2019-05-15 RX ORDER — METOPROLOL TARTRATE 50 MG
25 TABLET ORAL ONCE
Refills: 0 | Status: COMPLETED | OUTPATIENT
Start: 2019-05-15 | End: 2019-05-15

## 2019-05-15 RX ORDER — METOPROLOL TARTRATE 50 MG
1 TABLET ORAL
Qty: 30 | Refills: 0
Start: 2019-05-15 | End: 2019-06-13

## 2019-05-15 RX ORDER — MORPHINE SULFATE 50 MG/1
4 CAPSULE, EXTENDED RELEASE ORAL ONCE
Refills: 0 | Status: DISCONTINUED | OUTPATIENT
Start: 2019-05-15 | End: 2019-05-15

## 2019-05-15 RX ORDER — FLECAINIDE ACETATE 50 MG
1 TABLET ORAL
Qty: 60 | Refills: 0
Start: 2019-05-15 | End: 2019-06-13

## 2019-05-15 RX ADMIN — SODIUM CHLORIDE 1000 MILLILITER(S): 9 INJECTION INTRAMUSCULAR; INTRAVENOUS; SUBCUTANEOUS at 12:40

## 2019-05-15 RX ADMIN — Medication 25 MILLIGRAM(S): at 16:27

## 2019-05-15 RX ADMIN — Medication 20 MILLIGRAM(S): at 12:20

## 2019-05-15 RX ADMIN — Medication 50 MILLIGRAM(S): at 18:21

## 2019-05-15 RX ADMIN — MORPHINE SULFATE 4 MILLIGRAM(S): 50 CAPSULE, EXTENDED RELEASE ORAL at 12:41

## 2019-05-15 NOTE — ED PROVIDER NOTE - OBJECTIVE STATEMENT
68 yo F hx of Afib s/p ablation 2011 on asa, hysterectomy, HDL, choleycstomy, hypothryoid, breast ca s/p left brestectomy and lymph node removal in remission, neck surgery, stimulator was sent in from GI office (). Patient was scheduled for endoscopy and colonscopy today and found to be in rapid Afib and sent her to the ED.  She report feeling palpation intermittently the past few days. No chest pain. She report pain in the RUQ.     cards: darrin rutherford

## 2019-05-15 NOTE — ED ADULT NURSE NOTE - OBJECTIVE STATEMENT
patient c/o rapid afib from GI doctor Anad prior to endoscopy/ colonoscopy. hx of a-fib with ablation. Breast Cancer survivor with left mastectomy. patient battling chronic neck pain and acute right sided abdominal pain which is why she was at the GI doctor.

## 2019-05-15 NOTE — ED ADULT TRIAGE NOTE - CHIEF COMPLAINT QUOTE
Patient BIBA, patient was to get endoscopy done today, states she had palpitations and back pain earlier in the day, found with heartrate 150s, sent to critcal care upon ED arrival

## 2019-05-15 NOTE — ED PROVIDER NOTE - PHYSICAL EXAMINATION
VITAL SIGNS: I have reviewed nursing notes and confirm.  CONSTITUTIONAL: Well-developed; well-nourished;   SKIN: Skin exam is warm and dry, no acute rash.  HEAD: Normocephalic; atraumatic.  EYES: PERRL, EOM intact; conjunctiva and sclera clear.  ENT: No nasal discharge; airway clear. Throat clear.  NECK: Supple; non tender.    CARD: S1, S2 normal; no murmurs, gallops, or rubs. (+) irregular (+) tachycardia   RESP: No wheezes,  no rales or rhonchi.   ABD:  soft; non-distended; non-tender;   EXT: Normal ROM. No clubbing, cyanosis or edema.  NEURO: Alert, oriented. Grossly unremarkable. No focal deficits. no facial droop, moves all extremities,   PSYCH: Cooperative, appropriate.

## 2019-05-15 NOTE — ED PROVIDER NOTE - NS ED ROS FT
Review of Systems  •	CONSTITUTIONAL - no  fever, no diaphoresis, no weight change  •	SKIN - no rash  •	HEMATOLOGIC - no bleeding, no bruising  •	EYES - no eye pain, no blurred vision  •	ENT - no change in hearing, no pain  •	RESPIRATORY - no shortness of breath, no cough  •	CARDIAC - no chest pain, (+)  palpitations  •	GI - (+)   abd pain, no nausea, no vomiting, no diarrhea, no constipation, no bleeding  •	GENITO-URINARY - no discharge, no dysuria; no hematuria,   •	ENDO - no polydypsia, no polyurea, no heat/no cold intolerance  •	MUSCULOSKELETAL - no joint pain, no swelling, no redness  •	NEUROLOGIC - no weakness, no headache, no anesthesia, no paresthesias  •	PSYCH - no anxiety, non suicidal, non homicidal, no hallucination, no depression

## 2019-05-15 NOTE — ED PROVIDER NOTE - CLINICAL SUMMARY MEDICAL DECISION MAKING FREE TEXT BOX
70 yo F hx of afib s/p ablation on toprol 25 mg PO was found to be in afib at endoscopy. patient had colonoscopy prep yesterday. She report taking her meds last night. Afib controlled with cardizem 20 mg IV and now controlled with patient's home dose of toprol. 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 Vital stable. trop negative. electrolyte wnl. will follow up in office tomorrow. will give 3 days of percocet for RUQ pain that patient is being worked up for. Liver enzyme wnl.

## 2019-05-15 NOTE — ED ADULT NURSE REASSESSMENT NOTE - NS ED NURSE REASSESS COMMENT FT1
Assumed care of pt from OSCAR Fisher.  Pt resting on stretcher, family at bedside.  Awaiting cardiac consult

## 2019-05-15 NOTE — CONSULT NOTE ADULT - SUBJECTIVE AND OBJECTIVE BOX
[facility], [facility]                                          [address], [address], [place] [address]                                               PHONE: [phone]    FAX: [phone] [address], [address], [place] [address]                                       PHONE: [phone]    FAX: [phone]  *******************************************************************************    Reason for Consult: Palpitations/AF    HPI:  SKYLER OSUNA is a 69y Female with HTN, HLD, history of SVT s/p ablation, mild-mod AS, normal coronaries on cardiac cath March 2018, preserved EF who presented to the ER today from endoscopy.  She was to have EGD/colonoscopy for work up of abdominal/right flank pain.  Completed bowel prep last night.  Did not take Toprol this morning.  She reports onset of palpitations around 10am today and upon presentation to endoscopy, she was found to be in new onset rapid AF.  In the ER she was given Cardizem 20mg IVP and then her home dose of Toprol XL 25mg.  Approx 4pm she converted back to SR and her palpitations resolved.  Denies anginal CP, dizziness, lightheadedness, and syncope.  Denies SOB, CUNNINGHAM, orthopnea, PND, and edema.  She admits to palpitations 1-2 times/week, lasting < 1min at a time.    PAST MEDICAL & SURGICAL HISTORY: HTN, HLD, SVT s/p ablation, edward, appy, thyroidectomy, COPD, neck surgery, mild-mod AS      No Known Allergies      MEDICATIONS  (STANDING):  flecainide 50 milliGRAM(s) Oral two times a day  Toprol XL 25, Lipitor 10, synthroid, prilosec, xantacc, lexapro, asa 81  MEDICATIONS  (PRN):      Social History: no active (former) tobacco / EtOH / IVDA    Family History: + CVD in father and brother    ROS: As noted above, otherwise unremarkable.    Vital Signs Last 24 Hrs  T(C): 36.9 (15 May 2019 12:28), Max: 36.9 (15 May 2019 12:28)  T(F): 98.5 (15 May 2019 12:28), Max: 98.5 (15 May 2019 12:28)  HR: 97 (15 May 2019 13:30) (97 - 154)  BP: 122/56 (15 May 2019 13:30) (116/65 - 182/106)  BP(mean): --  RR: 14 (15 May 2019 13:30) (14 - 18)  SpO2: 95% (15 May 2019 13:30) (95% - 98%)    I&O's Detail    I&O's Summary          PHYSICAL EXAM:  General: Appears well developed, well nourished, no acute distress  HEENT: Head: normocephalic, atraumatic  Eyes: Pupils equal and reactive  Neck: Supple, no carotid bruit, no JVD, no HJR  CARDIOVASCULAR: Normal S1 and S2, no murmur, rub, or gallop  LUNGS: Clear to auscultation bilaterally, no rales, rhonchi or wheeze  ABDOMEN: Soft, nontender, non-distended, positive bowel sounds, no mass or bruit  EXTREMITIES: No edema, distal pulses WNL  SKIN: Warm and dry with normal turgor  NEURO: Alert & oriented x 3, grossly intact  PSYCH: normal mood and affect    LABS:                        13.0   8.4   )-----------( 234      ( 15 May 2019 12:26 )             40.0     05-15    144  |  108<H>  |  18.0  ----------------------------<  80  4.8   |  23.0  |  0.84    Ca    9.5      15 May 2019 12:26  Mg     1.9     05-15    TPro  7.2  /  Alb  4.0  /  TBili  0.3<L>  /  DBili  x   /  AST  23  /  ALT  15  /  AlkPhos  83  05-15    CARDIAC MARKERS ( 15 May 2019 12:26 )  x     / <0.01 ng/mL / x     / x     / x          PT/INR - ( 15 May 2019 12:26 )   PT: 12.2 sec;   INR: 1.06 ratio         PTT - ( 15 May 2019 12:26 )  PTT:30.9 sec      RADIOLOGY & ADDITIONAL STUDIES:    ECG: AF 133bpm, nonspecific STT change (rate related)    ECHO:9/2018 RG 65%, diastolic dysfunction, mild-mod AS, trace MR/TR      CARDIAC CATHETERIZATION: 3/2018.  Luminal irregularities    Assessment and Plan:  In summary, SKYLER OSUNA is a 69y Female with past medical history significant for HTN, HLD, history of SVT s/p ablation, mild-mod AS, normal coronaries on cardiac cath March 2018, preserved EF who presents with new onset rapid AF in setting of bowel prep for colonoscopy.  Has converted back to SR.    - Labs and EKG noted  - Plan to increase to Toprol XL 50mg daily  - Start flecainide 50mg BID  - Hold off on AC for now.  This will be readdressed in the outpatient setting.  - Consideration for ILR given recurrent short palpitations.  - She can be discharged to follow up with Dr. Arreguin later this week in the office  Thank you for allowing me to participate in the care of your patient.      Sincerely,    Dagoberto Wright MD [facility], [facility]                                          [address], [place] [address]                                               PHONE: [phone]    FAX: [phone] [address], [address], [place] [address]                                       PHONE: [phone]    FAX: [phone]  *******************************************************************************    Reason for Consult: Palpitations/AF    HPI:  SKYLER OSUNA is a 69y Female with HTN, HLD, history of SVT s/p ablation, mild-mod AS, normal coronaries on cardiac cath March 2018, preserved EF who presented to the ER today from endoscopy.  She was to have EGD/colonoscopy for work up of abdominal/right flank pain.  Completed bowel prep last night.  Did not take Toprol this morning.  She reports onset of palpitations around 10am today and upon presentation to endoscopy, she was found to be in new onset rapid AF.  In the ER she was given Cardizem 20mg IVP and then her home dose of Toprol XL 25mg.  Approx 4pm she converted back to SR and her palpitations resolved.  Denies anginal CP, dizziness, lightheadedness, and syncope.  Denies SOB, CUNN[place]AM, orthopnea, PND, and edema.  She admits to palpitations 1-2 times/week, lasting < 1min at a time.    PAST MEDICAL & SURGICAL HISTORY: HTN, HLD, SVT s/p ablation, edward, appy, thyroidectomy, COPD, neck surgery, mild-mod AS      No Known Allergies      MEDICATIONS  (STANDING):  flecainide 50 milliGRAM(s) Oral two times a day  Toprol XL 25, Lipitor 10, synthroid, prilosec, xantacc, lexapro, asa 81  MEDICATIONS  (PRN):      Social History: no active (former) tobacco / EtOH / IVDA    Family History: + CVD in father and brother    ROS: As noted above, otherwise unremarkable.    Vital Signs Last 24 Hrs  T(C): 36.9 (15 May 2019 12:28), Max: 36.9 (15 May 2019 12:28)  T(F): 98.5 (15 May 2019 12:28), Max: 98.5 (15 May 2019 12:28)  HR: 97 (15 May 2019 13:30) (97 - 154)  BP: 122/56 (15 May 2019 13:30) (116/65 - 182/106)  BP(mean): --  RR: 14 (15 May 2019 13:30) (14 - 18)  SpO2: 95% (15 May 2019 13:30) (95% - 98%)    I&O's Detail    I&O's Summary          PHYSICAL EXAM:  General: Appears well developed, well nourished, no acute distress  HEENT: Head: normocephalic, atraumatic  Eyes: Pupils equal and reactive  Neck: Supple, no carotid bruit, no JVD, no HJR  CARDIOVASCULAR: Normal S1 and S2, no murmur, rub, or gallop  LUNGS: Clear to auscultation bilaterally, no rales, rhonchi or wheeze  ABDOMEN: Soft, nontender, non-distended, positive bowel sounds, no mass or bruit  EXTREMITIES: No edema, distal pulses WNL  SKIN: Warm and dry with normal turgor  NEURO: Alert & oriented x 3, grossly intact  PSYCH: normal mood and affect    LABS:                        13.0   8.4   )-----------( 234      ( 15 May 2019 12:26 )             40.0     05-15    144  |  108<H>  |  18.0  ----------------------------<  80  4.8   |  23.0  |  0.84    Ca    9.5      15 May 2019 12:26  Mg     1.9     05-15    TPro  7.2  /  Alb  4.0  /  TBili  0.3<L>  /  DBili  x   /  AST  23  /  ALT  15  /  AlkPhos  83  05-15    CARDIAC MARKERS ( 15 May 2019 12:26 )  x     / <0.01 ng/mL / x     / x     / x          PT/INR - ( 15 May 2019 12:26 )   PT: 12.2 sec;   INR: 1.06 ratio         PTT - ( 15 May 2019 12:26 )  PTT:30.9 sec      RADIOLOGY & ADDITIONAL STUDIES:    ECG: AF 133bpm, nonspecific STT change (rate related)    ECHO:9/2018 RG 65%, diastolic dysfunction, mild-mod AS, trace MR/TR      CARDIAC CATHETERIZATION: 3/2018.  Luminal irregularities    Assessment and Plan:  In summary, SKYLER OSUNA is a 69y Female with past medical history significant for HTN, HLD, history of SVT s/p ablation, mild-mod AS, normal coronaries on cardiac cath March 2018, preserved EF who presents with new onset rapid AF in setting of bowel prep for colonoscopy.  Has converted back to SR.    - Labs and EKG noted  - Plan to increase to Toprol XL 50mg daily  - Start flecainide 50mg BID  - d/w Dr. rAreguin (EP).  Hold off on AC for now.  This will be readdressed in the outpatient setting.  Short episode of AF, no clear evidence of previous AF, and plan for GI procedures.  - Consideration for ILR given recurrent short palpitations.  - She can be discharged to follow up with Dr. Arreguin later this week in the office  Thank you for allowing me to participate in the care of your patient.      Sincerely,    Dagoberto Wright MD

## 2019-05-15 NOTE — ED PROVIDER NOTE - PROGRESS NOTE DETAILS
Patient given cardizem 20 mg IV, , KAYLAN 116/65 I spoke to Rhaps[employer]de[employer] cards, recommend given patient's home toprol dose now and monitor for HR. If controlled, can come to office tomorrow. patient received her toprol 25 mg PO. Hr in the 80s, asymptomatic. would like to go home with follow up outpatient. cardiology saw the patient, report that she was never in afib. she is in suinus at 74 on 12 lead EKG. recommend to get flecindie 50 mg Po now and home flecindie 50 mg BID and toprol 50 mg PO daily.

## 2019-06-20 ENCOUNTER — APPOINTMENT (OUTPATIENT)
Dept: FAMILY MEDICINE | Facility: CLINIC | Age: 69
End: 2019-06-20
Payer: MEDICARE

## 2019-06-20 VITALS
OXYGEN SATURATION: 97 % | WEIGHT: 158 LBS | TEMPERATURE: 98.2 F | HEART RATE: 68 BPM | HEIGHT: 62 IN | SYSTOLIC BLOOD PRESSURE: 133 MMHG | DIASTOLIC BLOOD PRESSURE: 81 MMHG | BODY MASS INDEX: 29.08 KG/M2

## 2019-06-20 DIAGNOSIS — Z00.00 ENCOUNTER FOR GENERAL ADULT MEDICAL EXAMINATION W/OUT ABNORMAL FINDINGS: ICD-10-CM

## 2019-06-20 DIAGNOSIS — Z86.19 PERSONAL HISTORY OF OTHER INFECTIOUS AND PARASITIC DISEASES: ICD-10-CM

## 2019-06-20 PROCEDURE — 36415 COLL VENOUS BLD VENIPUNCTURE: CPT

## 2019-06-20 PROCEDURE — G0438: CPT

## 2019-06-20 RX ORDER — SUCRALFATE 1 G/1
1 TABLET ORAL 4 TIMES DAILY
Qty: 120 | Refills: 1 | Status: DISCONTINUED | COMMUNITY
Start: 2019-04-16 | End: 2019-06-20

## 2019-06-20 RX ORDER — OMEPRAZOLE 40 MG/1
40 CAPSULE, DELAYED RELEASE ORAL
Refills: 2 | Status: DISCONTINUED | COMMUNITY
Start: 2017-09-12 | End: 2019-06-20

## 2019-06-20 RX ORDER — DICYCLOMINE HYDROCHLORIDE 10 MG/1
10 CAPSULE ORAL 3 TIMES DAILY
Qty: 90 | Refills: 0 | Status: DISCONTINUED | COMMUNITY
Start: 2019-04-30 | End: 2019-06-20

## 2019-06-20 RX ORDER — ESCITALOPRAM OXALATE 10 MG/1
10 TABLET ORAL
Qty: 30 | Refills: 0 | Status: DISCONTINUED | COMMUNITY
Start: 2017-10-11 | End: 2019-06-20

## 2019-06-20 NOTE — HEALTH RISK ASSESSMENT
[Poor] : ~his/her~ current health as poor [No] : No [No falls in past year] : Patient reported no falls in the past year [0] : 2) Feeling down, depressed, or hopeless: Not at all (0) [Patient reported mammogram was normal] : Patient reported mammogram was normal [Patient reported PAP Smear was normal] : Patient reported PAP Smear was normal [Patient reported colonoscopy was normal] : Patient reported colonoscopy was normal [HIV test declined] : HIV test declined [None] : None [Alone] : lives alone [Retired] : retired [Single] : single [# Of Children ___] : has [unfilled] children [Feels Safe at Home] : Feels safe at home [Fully functional (bathing, dressing, toileting, transferring, walking, feeding)] : Fully functional (bathing, dressing, toileting, transferring, walking, feeding) [Fully functional (using the telephone, shopping, preparing meals, housekeeping, doing laundry, using] : Fully functional and needs no help or supervision to perform IADLs (using the telephone, shopping, preparing meals, housekeeping, doing laundry, using transportation, managing medications and managing finances) [Smoke Detector] : smoke detector [Safety elements used in home] : safety elements used in home [Seat Belt] :  uses seat belt [With Patient/Caregiver] : With Patient/Caregiver [Designated Healthcare Proxy] : Designated healthcare proxy [Name: ___] : Health Care Proxy's Name: [unfilled]  [Relationship: ___] : Relationship: [unfilled] [FreeTextEntry1] : feel weak, lightheaded, leg weakness [] : No [de-identified] : quit 6 years ago [de-identified] : sober for 30 years [Language] : denies difficulty with language [Change in mental status noted] : No change in mental status noted [Sexually Active] : not sexually active [Handling Complex Tasks] : denies difficulty handling complex tasks [Reports changes in hearing] : Reports no changes in hearing [Reports changes in vision] : Reports no changes in vision [Reports changes in dental health] : Reports no changes in dental health [MammogramDate] : 2019 [PapSmearDate] : 05/2019 [ColonoscopyDate] : 2010 [BoneDensityComments] : never done [HIVComments] : negative [HIVDate] : 2019 [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

## 2019-06-20 NOTE — HISTORY OF PRESENT ILLNESS
[FreeTextEntry1] : Establish anew PCP [de-identified] : 70 y/o F with hx Breast cancer in remission, 2004 left mastectomy and chemotherapy, COPD, GERd, A.Fib recenlty Dx presents today to establish anew PCP.\par \par Previous PCP: Lucia Denis NP at Dr Alcocer office in [facility] seen by Dr yris Atkins.\par Pt reports long hx severe Shingles aprox 9 years ago,with nerve damage.\par States has RT facial numbness recenlty and RUQ and RLQ pain described as burning.\par 4/2019 seen at ED and CT abdomen done: Mild intrahepatic biliary dilatation.\par Pt was seen by GI, and schedule for Colonoscopy. procedure needed to be aborted due to episode of A.Fib.\par Pt states feels weak , lightheadedness and leg weakness. States is unable to walk dog due to weakness.

## 2019-06-20 NOTE — ASSESSMENT
[FreeTextEntry1] : 68 y/o F presents today to establish anew PCP with multiple medical problems:\par \par HCM:\par -blood and UA today\par \par Mammo: 2019\par \par Gyn: 2019\par \par Colonoscopy: needs to reschedule\par \par Hx Breast Cancer in remission;\par -f/up w/ oncology Dr Grady.\par \par A.Fib:\par -Cardiology referral, as per pt request wants new Cardiology\par -on flecainide and metoprolol\par \par Hypothyroidism:\par -on synthroid 112mcg daily\par \par Lightheadedness/ Gait instability/ Weakness LE's:\par -neurology referral\par \par Hx Shingles with Neuropathy:\par -Use Lidoderm patch prn.\par \par Immunizations records: will obtain from [facility]

## 2019-06-20 NOTE — PHYSICAL EXAM
[No Acute Distress] : no acute distress [Well Nourished] : well nourished [Well-Appearing] : well-appearing [Well Developed] : well developed [PERRL] : pupils equal round and reactive to light [Normal Sclera/Conjunctiva] : normal sclera/conjunctiva [EOMI] : extraocular movements intact [Normal Outer Ear/Nose] : the outer ears and nose were normal in appearance [Normal Oropharynx] : the oropharynx was normal [Supple] : supple [No JVD] : no jugular venous distention [No Lymphadenopathy] : no lymphadenopathy [Thyroid Normal, No Nodules] : the thyroid was normal and there were no nodules present [No Respiratory Distress] : no respiratory distress  [Clear to Auscultation] : lungs were clear to auscultation bilaterally [No Accessory Muscle Use] : no accessory muscle use [Normal Rate] : normal rate  [Normal S1, S2] : normal S1 and S2 [Regular Rhythm] : with a regular rhythm [No Abdominal Bruit] : a ~M bruit was not heard ~T in the abdomen [No Carotid Bruits] : no carotid bruits [No Varicosities] : no varicosities [Pedal Pulses Present] : the pedal pulses are present [No Edema] : there was no peripheral edema [No Extremity Clubbing/Cyanosis] : no extremity clubbing/cyanosis [Non Tender] : non-tender [Soft] : abdomen soft [No Palpable Aorta] : no palpable aorta [Non-distended] : non-distended [No Masses] : no abdominal mass palpated [No HSM] : no HSM [Normal Bowel Sounds] : normal bowel sounds [Normal Posterior Cervical Nodes] : no posterior cervical lymphadenopathy [Normal Anterior Cervical Nodes] : no anterior cervical lymphadenopathy [No CVA Tenderness] : no CVA  tenderness [No Spinal Tenderness] : no spinal tenderness [No Joint Swelling] : no joint swelling [Grossly Normal Strength/Tone] : grossly normal strength/tone [Normal Gait] : normal gait [No Rash] : no rash [Coordination Grossly Intact] : coordination grossly intact [Deep Tendon Reflexes (DTR)] : deep tendon reflexes were 2+ and symmetric [No Focal Deficits] : no focal deficits [Normal Affect] : the affect was normal [de-identified] : systolic murmur 2/6 [Normal Insight/Judgement] : insight and judgment were intact

## 2019-06-20 NOTE — COUNSELING
[Weight management counseling provided] : Weight management [Healthy eating counseling provided] : healthy eating [Behavioral health counseling provided] : behavioral health  [Activity counseling provided] : activity [Fall prevention counseling provided] : fall prevention  [Good understanding] : Patient has a good understanding of disease, goals and obesity follow-up plan [None] : None

## 2019-06-20 NOTE — REVIEW OF SYSTEMS
[Muscle Weakness] : muscle weakness [Fatigue] : fatigue [Unsteady Walking] : ataxia [de-identified] : aaron

## 2019-06-24 ENCOUNTER — OUTPATIENT (OUTPATIENT)
Dept: OUTPATIENT SERVICES | Facility: HOSPITAL | Age: 69
LOS: 1 days | End: 2019-06-24
Payer: MEDICARE

## 2019-06-24 ENCOUNTER — APPOINTMENT (OUTPATIENT)
Dept: RADIOLOGY | Facility: CLINIC | Age: 69
End: 2019-06-24
Payer: MEDICARE

## 2019-06-24 DIAGNOSIS — Z00.00 ENCOUNTER FOR GENERAL ADULT MEDICAL EXAMINATION WITHOUT ABNORMAL FINDINGS: ICD-10-CM

## 2019-06-24 LAB
25(OH)D3 SERPL-MCNC: 34.9 NG/ML
ALBUMIN SERPL ELPH-MCNC: 4.5 G/DL
ALP BLD-CCNC: 79 U/L
ALT SERPL-CCNC: 29 U/L
ANION GAP SERPL CALC-SCNC: 18 MMOL/L
APPEARANCE: CLEAR
AST SERPL-CCNC: 35 U/L
BASOPHILS # BLD AUTO: 0.04 K/UL
BASOPHILS NFR BLD AUTO: 0.7 %
BILIRUB SERPL-MCNC: 0.3 MG/DL
BILIRUBIN URINE: NEGATIVE
BLOOD URINE: NEGATIVE
BUN SERPL-MCNC: 13 MG/DL
CALCIUM SERPL-MCNC: 9.9 MG/DL
CHLORIDE SERPL-SCNC: 99 MMOL/L
CHOLEST SERPL-MCNC: 201 MG/DL
CHOLEST/HDLC SERPL: 4.4 RATIO
CO2 SERPL-SCNC: 22 MMOL/L
COLOR: NORMAL
CREAT SERPL-MCNC: 1.15 MG/DL
EOSINOPHIL # BLD AUTO: 0.15 K/UL
EOSINOPHIL NFR BLD AUTO: 2.6 %
FOLATE SERPL-MCNC: 17.1 NG/ML
GLUCOSE QUALITATIVE U: NEGATIVE
GLUCOSE SERPL-MCNC: 96 MG/DL
HCT VFR BLD CALC: 41.7 %
HDLC SERPL-MCNC: 46 MG/DL
HGB BLD-MCNC: 12.6 G/DL
IMM GRANULOCYTES NFR BLD AUTO: 0.2 %
KETONES URINE: NEGATIVE
LDLC SERPL CALC-MCNC: 123 MG/DL
LEUKOCYTE ESTERASE URINE: NEGATIVE
LYMPHOCYTES # BLD AUTO: 2.05 K/UL
LYMPHOCYTES NFR BLD AUTO: 35.9 %
MAN DIFF?: NORMAL
MCHC RBC-ENTMCNC: 29.2 PG
MCHC RBC-ENTMCNC: 30.2 GM/DL
MCV RBC AUTO: 96.5 FL
MONOCYTES # BLD AUTO: 0.51 K/UL
MONOCYTES NFR BLD AUTO: 8.9 %
NEUTROPHILS # BLD AUTO: 2.95 K/UL
NEUTROPHILS NFR BLD AUTO: 51.7 %
NITRITE URINE: NEGATIVE
PH URINE: 5.5
PLATELET # BLD AUTO: 219 K/UL
POTASSIUM SERPL-SCNC: 5.1 MMOL/L
PROT SERPL-MCNC: 7.2 G/DL
PROTEIN URINE: NEGATIVE
RBC # BLD: 4.32 M/UL
RBC # FLD: 13.7 %
SODIUM SERPL-SCNC: 139 MMOL/L
SPECIFIC GRAVITY URINE: 1.01
TRIGL SERPL-MCNC: 161 MG/DL
TSH SERPL-ACNC: 1.25 UIU/ML
UROBILINOGEN URINE: NORMAL
VIT B12 SERPL-MCNC: 596 PG/ML
WBC # FLD AUTO: 5.71 K/UL

## 2019-06-24 PROCEDURE — 77080 DXA BONE DENSITY AXIAL: CPT

## 2019-06-24 PROCEDURE — 77080 DXA BONE DENSITY AXIAL: CPT | Mod: 26

## 2019-07-23 ENCOUNTER — RX RENEWAL (OUTPATIENT)
Age: 69
End: 2019-07-23

## 2019-07-23 RX ORDER — IBANDRONATE SODIUM 150 MG/1
150 TABLET ORAL
Qty: 12 | Refills: 2 | Status: ACTIVE | COMMUNITY
Start: 2019-07-23 | End: 1900-01-01

## 2019-07-26 ENCOUNTER — MEDICATION RENEWAL (OUTPATIENT)
Age: 69
End: 2019-07-26

## 2019-07-30 ENCOUNTER — MEDICATION RENEWAL (OUTPATIENT)
Age: 69
End: 2019-07-30

## 2019-07-31 ENCOUNTER — APPOINTMENT (OUTPATIENT)
Dept: NEUROLOGY | Facility: CLINIC | Age: 69
End: 2019-07-31
Payer: MEDICARE

## 2019-07-31 VITALS
BODY MASS INDEX: 27.42 KG/M2 | WEIGHT: 149 LBS | SYSTOLIC BLOOD PRESSURE: 130 MMHG | DIASTOLIC BLOOD PRESSURE: 70 MMHG | HEIGHT: 62 IN

## 2019-07-31 DIAGNOSIS — G45.9 TRANSIENT CEREBRAL ISCHEMIC ATTACK, UNSPECIFIED: ICD-10-CM

## 2019-07-31 PROCEDURE — 99204 OFFICE O/P NEW MOD 45 MIN: CPT

## 2019-08-05 ENCOUNTER — FORM ENCOUNTER (OUTPATIENT)
Age: 69
End: 2019-08-05

## 2019-08-06 ENCOUNTER — OUTPATIENT (OUTPATIENT)
Dept: OUTPATIENT SERVICES | Facility: HOSPITAL | Age: 69
LOS: 1 days | End: 2019-08-06
Payer: MEDICARE

## 2019-08-06 ENCOUNTER — APPOINTMENT (OUTPATIENT)
Dept: CT IMAGING | Facility: CLINIC | Age: 69
End: 2019-08-06
Payer: MEDICARE

## 2019-08-06 DIAGNOSIS — Z00.00 ENCOUNTER FOR GENERAL ADULT MEDICAL EXAMINATION WITHOUT ABNORMAL FINDINGS: ICD-10-CM

## 2019-08-06 DIAGNOSIS — G45.9 TRANSIENT CEREBRAL ISCHEMIC ATTACK, UNSPECIFIED: ICD-10-CM

## 2019-08-06 PROCEDURE — 70450 CT HEAD/BRAIN W/O DYE: CPT

## 2019-08-06 PROCEDURE — 70450 CT HEAD/BRAIN W/O DYE: CPT | Mod: 26

## 2019-08-15 ENCOUNTER — APPOINTMENT (OUTPATIENT)
Dept: FAMILY MEDICINE | Facility: CLINIC | Age: 69
End: 2019-08-15
Payer: MEDICARE

## 2019-08-15 VITALS
HEIGHT: 62 IN | TEMPERATURE: 98.7 F | WEIGHT: 150 LBS | HEART RATE: 67 BPM | SYSTOLIC BLOOD PRESSURE: 97 MMHG | BODY MASS INDEX: 27.6 KG/M2 | OXYGEN SATURATION: 95 % | DIASTOLIC BLOOD PRESSURE: 60 MMHG

## 2019-08-15 DIAGNOSIS — R09.82 POSTNASAL DRIP: ICD-10-CM

## 2019-08-15 PROCEDURE — 99214 OFFICE O/P EST MOD 30 MIN: CPT

## 2019-08-15 NOTE — HISTORY OF PRESENT ILLNESS
[FreeTextEntry1] : Not feeling good\par Trip cancellation forms to complete [de-identified] : 70 y/o F with hx Breast cancer in remission, 2004 left mastectomy and chemotherapy, COPD, GERd, A.Fib recenlty Dx.\par \par facial numbness and drooping for several months. Seen by neurology. CT scan: normal.\par \par Pt reports sore throat, chest tightness, postnasal drip.\par \par States has to cancel trip to [facility] 8/6/19 due to Facial numbness and pending tests to be done by neurology.

## 2019-08-15 NOTE — PHYSICAL EXAM
[No Acute Distress] : no acute distress [Well Nourished] : well nourished [Well Developed] : well developed [Normal Sclera/Conjunctiva] : normal sclera/conjunctiva [Well-Appearing] : well-appearing [PERRL] : pupils equal round and reactive to light [EOMI] : extraocular movements intact [Normal Outer Ear/Nose] : the outer ears and nose were normal in appearance [No JVD] : no jugular venous distention [Normal Oropharynx] : the oropharynx was normal [No Lymphadenopathy] : no lymphadenopathy [Supple] : supple [Thyroid Normal, No Nodules] : the thyroid was normal and there were no nodules present [No Respiratory Distress] : no respiratory distress  [No Accessory Muscle Use] : no accessory muscle use [Clear to Auscultation] : lungs were clear to auscultation bilaterally [Normal Rate] : normal rate  [Regular Rhythm] : with a regular rhythm [Normal S1, S2] : normal S1 and S2 [No Murmur] : no murmur heard [No Carotid Bruits] : no carotid bruits [No Abdominal Bruit] : a ~M bruit was not heard ~T in the abdomen [No Varicosities] : no varicosities [No Edema] : there was no peripheral edema [Pedal Pulses Present] : the pedal pulses are present [No Palpable Aorta] : no palpable aorta [No Extremity Clubbing/Cyanosis] : no extremity clubbing/cyanosis [Soft] : abdomen soft [Non-distended] : non-distended [Non Tender] : non-tender [No Masses] : no abdominal mass palpated [No HSM] : no HSM [Normal Bowel Sounds] : normal bowel sounds [Normal Posterior Cervical Nodes] : no posterior cervical lymphadenopathy [Normal Anterior Cervical Nodes] : no anterior cervical lymphadenopathy [No CVA Tenderness] : no CVA  tenderness [No Spinal Tenderness] : no spinal tenderness [No Joint Swelling] : no joint swelling [No Rash] : no rash [Grossly Normal Strength/Tone] : grossly normal strength/tone [No Focal Deficits] : no focal deficits [Coordination Grossly Intact] : coordination grossly intact [Normal Gait] : normal gait [Deep Tendon Reflexes (DTR)] : deep tendon reflexes were 2+ and symmetric [Normal Affect] : the affect was normal [Normal Insight/Judgement] : insight and judgment were intact

## 2019-08-15 NOTE — COUNSELING
[Fall prevention counseling provided] : Fall prevention counseling provided [Behavioral health counseling provided] : Behavioral health counseling provided [Good understanding] : Patient has a good understanding of lifestyle changes and steps needed to achieve self management goal [None] : None

## 2019-08-15 NOTE — ASSESSMENT
[FreeTextEntry1] : 70 y/o F presents today for f/up\par \par HCM:\par -6/20/19\par \par Mammo: 2019\par \par Gyn: 2019\par \par Colonoscopy: needs to reschedule\par \par Hx Breast Cancer in remission;\par -f/up w/ oncology Dr Grady.\par \par A.Fib:\par -Cardiology referral, as per pt request wants new Cardiology\par -on flecainide and metoprolol\par \par Hypothyroidism:\par -on synthroid 112mcg daily\par \par Right facial numbness> TIA\par -Seen by neurology , Dr Ng.\par -CT scan normal.\par -Pending Doppler\par \par Postnasal drip:\par -start fluticasone.\par \par Hx Shingles with Neuropathy:\par -Use Lidoderm patch prn.\par \par Immunizations records: will obtain from [facility]. \par

## 2019-08-21 ENCOUNTER — APPOINTMENT (OUTPATIENT)
Dept: NEUROLOGY | Facility: CLINIC | Age: 69
End: 2019-08-21
Payer: MEDICARE

## 2019-08-21 PROCEDURE — 93886 INTRACRANIAL COMPLETE STUDY: CPT

## 2019-08-21 PROCEDURE — 93880 EXTRACRANIAL BILAT STUDY: CPT

## 2019-08-21 PROCEDURE — 93890: CPT

## 2019-08-23 NOTE — PROCEDURE
[FreeTextEntry3] : Carotid duplex  Dr. López\par \par Date of study: 8/21/19\par \par Real-time color duplex ultrasound examination of the carotid system was performed including spectral waveform analysis and color-flow evaluation.\par \par Some plaque was seen near the bulbs bilaterally. Doppler flow characteristics showed no significant peak systolic or end-diastolic velocity elevations in either internal carotid artery to suggest any hemodynamically significant stenosis.\par \par Impression: Some plaque near the bulbs bilaterally. No ultrasound evidence for any hemodynamically significant stenosis in either internal carotid artery.\par \par Transcranial Doppler normal as well.\par

## 2019-08-29 ENCOUNTER — RX CHANGE (OUTPATIENT)
Age: 69
End: 2019-08-29

## 2019-08-29 RX ORDER — GABAPENTIN 300 MG/1
300 CAPSULE ORAL TWICE DAILY
Qty: 60 | Refills: 2 | Status: DISCONTINUED | COMMUNITY
Start: 2019-08-06 | End: 2019-08-29

## 2019-08-30 ENCOUNTER — TRANSCRIPTION ENCOUNTER (OUTPATIENT)
Age: 69
End: 2019-08-30

## 2019-09-16 ENCOUNTER — RX RENEWAL (OUTPATIENT)
Age: 69
End: 2019-09-16

## 2019-10-01 ENCOUNTER — RX RENEWAL (OUTPATIENT)
Age: 69
End: 2019-10-01

## 2019-10-03 ENCOUNTER — MED ADMIN CHARGE (OUTPATIENT)
Age: 69
End: 2019-10-03

## 2019-10-03 ENCOUNTER — APPOINTMENT (OUTPATIENT)
Dept: FAMILY MEDICINE | Facility: CLINIC | Age: 69
End: 2019-10-03
Payer: MEDICARE

## 2019-10-03 VITALS
TEMPERATURE: 98 F | BODY MASS INDEX: 26.13 KG/M2 | HEART RATE: 68 BPM | OXYGEN SATURATION: 96 % | WEIGHT: 142 LBS | HEIGHT: 62 IN | SYSTOLIC BLOOD PRESSURE: 148 MMHG | DIASTOLIC BLOOD PRESSURE: 63 MMHG

## 2019-10-03 DIAGNOSIS — Z71.89 OTHER SPECIFIED COUNSELING: ICD-10-CM

## 2019-10-03 DIAGNOSIS — Z23 ENCOUNTER FOR IMMUNIZATION: ICD-10-CM

## 2019-10-03 PROCEDURE — G0009: CPT

## 2019-10-03 PROCEDURE — 90670 PCV13 VACCINE IM: CPT

## 2019-10-03 PROCEDURE — 99213 OFFICE O/P EST LOW 20 MIN: CPT | Mod: 25

## 2019-10-03 PROCEDURE — 90662 IIV NO PRSV INCREASED AG IM: CPT

## 2019-10-03 PROCEDURE — G0008: CPT

## 2019-10-03 RX ORDER — GABAPENTIN 300 MG/1
300 CAPSULE ORAL
Qty: 180 | Refills: 1 | Status: DISCONTINUED | COMMUNITY
Start: 2019-08-29 | End: 2019-10-03

## 2019-10-03 RX ORDER — ALENDRONATE SODIUM AND CHOLECALCIFEROL 70; 2800 MG/1; [IU]/1
70-2800 TABLET ORAL
Qty: 4 | Refills: 3 | Status: DISCONTINUED | COMMUNITY
Start: 2019-07-03 | End: 2019-10-03

## 2019-10-03 NOTE — HISTORY OF PRESENT ILLNESS
[FreeTextEntry1] : Immunizations. [de-identified] : 68 y/o F with hx Breast cancer in remission, 2004 left mastectomy and chemotherapy, COPD, GERd, A.Fib recenlty Dx.\par \par Persistent facial numbness and drooping for several months. Seen by neurology. CT scan/ Doppler: normal.\par \par

## 2019-10-03 NOTE — HEALTH RISK ASSESSMENT
[No falls in past year] : Patient reported no falls in the past year [No] : In the past 12 months have you used drugs other than those required for medical reasons? No [] : No

## 2019-10-03 NOTE — PHYSICAL EXAM
[No Acute Distress] : no acute distress [Well Developed] : well developed [Well Nourished] : well nourished [Normal Sclera/Conjunctiva] : normal sclera/conjunctiva [Well-Appearing] : well-appearing [EOMI] : extraocular movements intact [PERRL] : pupils equal round and reactive to light [Normal Outer Ear/Nose] : the outer ears and nose were normal in appearance [Normal Oropharynx] : the oropharynx was normal [No JVD] : no jugular venous distention [No Lymphadenopathy] : no lymphadenopathy [Supple] : supple [No Respiratory Distress] : no respiratory distress  [Thyroid Normal, No Nodules] : the thyroid was normal and there were no nodules present [Clear to Auscultation] : lungs were clear to auscultation bilaterally [No Accessory Muscle Use] : no accessory muscle use [Normal Rate] : normal rate  [Regular Rhythm] : with a regular rhythm [Normal S1, S2] : normal S1 and S2 [No Murmur] : no murmur heard [No Carotid Bruits] : no carotid bruits [No Abdominal Bruit] : a ~M bruit was not heard ~T in the abdomen [No Varicosities] : no varicosities [Pedal Pulses Present] : the pedal pulses are present [No Palpable Aorta] : no palpable aorta [No Edema] : there was no peripheral edema [Soft] : abdomen soft [No Extremity Clubbing/Cyanosis] : no extremity clubbing/cyanosis [Non Tender] : non-tender [Non-distended] : non-distended [No HSM] : no HSM [No Masses] : no abdominal mass palpated [Normal Posterior Cervical Nodes] : no posterior cervical lymphadenopathy [Normal Bowel Sounds] : normal bowel sounds [Normal Anterior Cervical Nodes] : no anterior cervical lymphadenopathy [No CVA Tenderness] : no CVA  tenderness [No Spinal Tenderness] : no spinal tenderness [No Joint Swelling] : no joint swelling [Coordination Grossly Intact] : coordination grossly intact [Grossly Normal Strength/Tone] : grossly normal strength/tone [No Rash] : no rash [No Focal Deficits] : no focal deficits [Normal Affect] : the affect was normal [Normal Gait] : normal gait [Deep Tendon Reflexes (DTR)] : deep tendon reflexes were 2+ and symmetric [Normal Insight/Judgement] : insight and judgment were intact

## 2019-10-03 NOTE — ASSESSMENT
[FreeTextEntry1] : 70 y/o F presents today for f/up for Immunizations:\par \par Immunizations:\par -Flu and Prevnar today.\par -Shingrix next visit.\par \par HCM:\par -6/20/19\par \par Mammo: 2019\par \par Gyn: 2019\par \par Colonoscopy: needs to reschedule\par \par Hx Breast Cancer in remission;\par -f/up w/ oncology Dr Grady.\par \par  Osteoporosis:\par -On Ibandronate mthly.\par \par A.Fib:\par -Cardiology referral, as per pt request wants new Cardiology\par -on flecainide and metoprolol\par \par Hypothyroidism:\par -on synthroid 112mcg daily\par \par Depression:\par -On lexapro 10mg.\par -Doing well.\par \par Right facial numbness> TIA\par -Seen by neurology , Dr Ng.\par -CT scan normal.\par -Normal Doppler\par \par Hx Shingles with Neuropathy:\par -Use Lidoderm patch prn.\par \par

## 2019-10-13 NOTE — ED STATDOCS - ATTENDING CONTRIBUTION TO CARE
I, Mary Ellen Fernandez, performed the initial face to face bedside interview with this patient regarding history of present illness, review of symptoms and relevant past medical, social and family history.  I completed an independent physical examination.  I was the initial provider who evaluated this patient. I have signed out the follow up of any pending tests (i.e. labs, radiological studies) to the ACP.  I have communicated the patient’s plan of care and disposition with the ACP.  The history, relevant review of systems, past medical and surgical history, medical decision making, and physical examination was documented by the scribe in my presence and I attest to the accuracy of the documentation.

## 2019-10-13 NOTE — ED STATDOCS - PATIENT PORTAL LINK FT
You can access the FollowMyHealth Patient Portal offered by [facility] by registering at the following website: [email or web address]. By joining [facility]’s FollowMyHealth portal, you will also be able to view your health information using other applications (apps) compatible with our system.

## 2019-10-13 NOTE — ED STATDOCS - CLINICAL SUMMARY MEDICAL DECISION MAKING FREE TEXT BOX
Pt with severe back pain in area electric stimulator, will treat pain check xray possible script for pain meds, patient to Follow up with Dr. Rivas.

## 2019-10-13 NOTE — ED STATDOCS - PSH
Date & Time: 5/7/2018, 1:39 PM  Patient: [facility]  Encounter Provider(s):    Luz Marina Little MD       To Whom It May Concern: Emi Mcardle was seen and treated in our department on 5/7/2018.  He should not return to work until cleared by primary p
No significant past surgical history

## 2019-10-13 NOTE — ED STATDOCS - OBJECTIVE STATEMENT
68y/o F with PMHx of Osteoporosis and spinal issues presents to the ED c/o worsening back pain, onset 2 days ago. Pt reports that she currently has a stimulator implanted in her neck that needs to be removed which is exacerbating her pain. Pt is followed by Dr. Rivas for her spinal issues. Pt is on Eliquis so is unable to take Advil. She reports that she has tried heating pads and ice packs without any relief. Denies CP N/V or SOB. No additional complaints at this time.

## 2019-10-13 NOTE — ED ADULT TRIAGE NOTE - CHIEF COMPLAINT QUOTE
has stimulator battery in back for spine problems. pain started 2 days ago, waiting for removal of stimulatior.

## 2019-10-17 NOTE — ASSESSMENT
[FreeTextEntry1] : 70 y/o F presents today for f/up after seen at [facility] ER on 10/13/19:\par \par Chronic back pain secondary to presence of neurostimulator:\par -Pt under works comp case.\par -Seen by Neurosx, Dr Rusty Ponce in [facility]: to be seen next week.\par -Percocet #30 tabs Rx. I-STOP checked.\par \par Immunizations:\par -Flu and Prevnar : up to date\par -Shingrix : on hold\par \par HCM:\par -6/20/19\par \par Mammo: 2019\par \par Gyn: 2019\par \par Colonoscopy: needs to reschedule\par \par Hx Breast Cancer in remission;\par -f/up w/ oncology Dr Grady.\par \par  Osteoporosis:\par -On Ibandronate mthly.\par \par A.Fib:\par -Cardiology f/up: Dr Aragon/ [place]\par -on flecainide and metoprolol\par -On Eliquis\par \par Hypothyroidism:\par -on synthroid 112mcg daily\par \par Depression:\par -On lexapro 10mg.\par -Doing well.\par \par Right facial numbness> TIA\par -Seen by neurology , Dr Ng.\par -CT scan normal.\par -Normal Doppler\par \par Hx Shingles with Neuropathy:\par -Use Lidoderm patch prn.\par \par

## 2019-10-17 NOTE — HEALTH RISK ASSESSMENT
[No] : In the past 12 months have you used drugs other than those required for medical reasons? No [No falls in past year] : Patient reported no falls in the past year [Intercurrent ED visits] : went to ED [] : No [de-identified] : 10/13/19 [facility]

## 2019-10-17 NOTE — PHYSICAL EXAM
[No Acute Distress] : no acute distress [Well Nourished] : well nourished [Well Developed] : well developed [Well-Appearing] : well-appearing [Normal Sclera/Conjunctiva] : normal sclera/conjunctiva [PERRL] : pupils equal round and reactive to light [EOMI] : extraocular movements intact [Normal Outer Ear/Nose] : the outer ears and nose were normal in appearance [Normal Oropharynx] : the oropharynx was normal [No JVD] : no jugular venous distention [No Lymphadenopathy] : no lymphadenopathy [Supple] : supple [Thyroid Normal, No Nodules] : the thyroid was normal and there were no nodules present [No Respiratory Distress] : no respiratory distress  [No Accessory Muscle Use] : no accessory muscle use [Clear to Auscultation] : lungs were clear to auscultation bilaterally [Normal Rate] : normal rate  [Regular Rhythm] : with a regular rhythm [Normal S1, S2] : normal S1 and S2 [No Murmur] : no murmur heard [No Carotid Bruits] : no carotid bruits [No Abdominal Bruit] : a ~M bruit was not heard ~T in the abdomen [No Varicosities] : no varicosities [Pedal Pulses Present] : the pedal pulses are present [No Palpable Aorta] : no palpable aorta [No Edema] : there was no peripheral edema [No Extremity Clubbing/Cyanosis] : no extremity clubbing/cyanosis [Soft] : abdomen soft [Non Tender] : non-tender [Non-distended] : non-distended [No Masses] : no abdominal mass palpated [No HSM] : no HSM [Normal Bowel Sounds] : normal bowel sounds [Normal Posterior Cervical Nodes] : no posterior cervical lymphadenopathy [Normal Anterior Cervical Nodes] : no anterior cervical lymphadenopathy [No CVA Tenderness] : no CVA  tenderness [No Spinal Tenderness] : no spinal tenderness [No Joint Swelling] : no joint swelling [Grossly Normal Strength/Tone] : grossly normal strength/tone [No Rash] : no rash [Coordination Grossly Intact] : coordination grossly intact [No Focal Deficits] : no focal deficits [Deep Tendon Reflexes (DTR)] : deep tendon reflexes were 2+ and symmetric [Normal Gait] : normal gait [Normal Insight/Judgement] : insight and judgment were intact [Normal Affect] : the affect was normal

## 2019-10-17 NOTE — HISTORY OF PRESENT ILLNESS
[FreeTextEntry1] : pain med refill [de-identified] : 68 y/o F with hx Breast cancer in remission, 2004 left mastectomy and chemotherapy, COPD, GERd, A.Fib recently Dx.\par \par Persistent facial numbness and drooping for several months. Seen by neurology. CT scan/ Doppler: normal.\par \par Pt under works comp case. Seen by neurosx Dr Rusty Ponce in [place]. Schedule for procedure to remove neuro stimulator next week, that is causing pain.\par Pt was seen in ER at [facility] on 10/13/19 and Rx Percocet with relived of pain.\par \par

## 2019-11-01 PROBLEM — Z01.818 PREOPERATIVE CLEARANCE: Status: ACTIVE | Noted: 2019-01-01

## 2019-11-01 PROBLEM — Z96.82 PRESENCE OF NEUROSTIMULATOR: Status: ACTIVE | Noted: 2019-01-01

## 2019-11-01 NOTE — COUNSELING
[Weight management counseling provided] : Weight management [Healthy eating counseling provided] : healthy eating [Activity counseling provided] : activity [Behavioral health counseling provided] : behavioral health  [Fall prevention counseling provided] : fall prevention  [Good understanding] : Patient has a good understanding of disease, goals and obesity follow-up plan [None] : None No

## 2019-11-04 NOTE — ASSESSMENT
[High Risk Surgery - Intraperitoneal, Intrathoracic or Supringuinal Vascular Procedures] : High Risk Surgery - Intraperitoneal, Intrathoracic or Supringuinal Vascular Procedures - Yes (1) [Ischemic Heart Disease] : Ischemic Heart Disease - No (0) [Congestive Heart Failure] : Congestive Heart Failure - No (0) [Prior Cerebrovascular Accident or TIA] : Prior Cerebrovascular Accident or TIA - No (0) [Creatinine >= 2mg/dL (1 Point)] : Creatinine >= 2mg/dL - No (0) [Insulin-dependent Diabetic (1 Point)] : Insulin-dependent Diabetic - No (0) [0] : 0 , RCRI Class: I, Risk of Post-Op Cardiac Complications: 0.4%, Procedure Risk: Low-Risk [Continue medications as is] : Continue current medications [As per surgery] : as per surgery [FreeTextEntry4] : Pending presurgical testing done 10/31/19 [FreeTextEntry6] : Hold Eliquis 3 days before procedure

## 2019-11-04 NOTE — HISTORY OF PRESENT ILLNESS
[Atrial Fibrillation] : atrial fibrillation [No Pertinent Pulmonary History] : no history of asthma, COPD, sleep apnea, or smoking [No Adverse Anesthesia Reaction] : no adverse anesthesia reaction in self or family member [(Patient denies any chest pain, claudication, dyspnea on exertion, orthopnea, palpitations or syncope)] : Patient denies any chest pain, claudication, dyspnea on exertion, orthopnea, palpitations or syncope [Chronic Anticoagulation] : no chronic anticoagulation [Chronic Kidney Disease] : no chronic kidney disease [Diabetes] : no diabetes [FreeTextEntry1] : Reacommodation of neurostimulator [FreeTextEntry2] : 11/6/19 [FreeTextEntry3] : Dr lee ann Ponce [FreeTextEntry4] : 68 y/o F with hx Breast cancer in remission, 2004 left mastectomy and chemotherapy, COPD, GERd, A.Fib on Eliquis recently Dx.\par \par Persistent facial numbness and drooping for several months. Seen by neurology. CT scan/ Doppler: normal.\par \par Pt under works comp case. Seen by neurosx Dr Rusty Ponce in [place]. Schedule for procedure to remove neuro stimulator next week, that is causing pain.\par  [FreeTextEntry7] : Presurgical testing done yesterday at [facility].\par Presurgical testing done 10/31/19 reviewed.\par Pt with no absolute contraindication for surgical procedure.\par Clear from Clinical stand point.

## 2019-12-04 PROBLEM — Z87.898 HISTORY OF SHORTNESS OF BREATH: Status: ACTIVE | Noted: 2019-01-01

## 2019-12-04 NOTE — REVIEW OF SYSTEMS
[As Noted in HPI] : as noted in HPI [Pleuritic Pain] : no pleuritic pain [Reflux] : reflux [Negative] : Pulmonary Hypertension

## 2019-12-04 NOTE — PHYSICAL EXAM
[General Appearance - Well Developed] : well developed [Normal Conjunctiva] : the conjunctiva exhibited no abnormalities [General Appearance - Well Nourished] : well nourished [Neck Cervical Mass (___cm)] : no neck mass was observed [Neck Appearance] : the appearance of the neck was normal [Heart Rate And Rhythm] : heart rate and rhythm were normal [Jugular Venous Distention Increased] : there was no jugular-venous distention [Heart Sounds] : normal S1 and S2 [Edema] : no peripheral edema present [Arterial Pulses Normal] : the arterial pulses were normal [Systolic grade ___/6] : A grade [unfilled]/6 systolic murmur was heard. [] : no respiratory distress [Exaggerated Use Of Accessory Muscles For Inspiration] : no accessory muscle use [Respiration, Rhythm And Depth] : normal respiratory rhythm and effort [Abdomen Soft] : soft [Auscultation Breath Sounds / Voice Sounds] : lungs were clear to auscultation bilaterally [Abnormal Walk] : normal gait [Skin Turgor] : normal skin turgor [No Focal Deficits] : no focal deficits [Oriented To Time, Place, And Person] : oriented to person, place, and time [Normal Oral Mucosa] : normal oral mucosa [Normal Oropharynx] : normal oropharynx [Nail Clubbing] : no clubbing of the fingernails [Cyanosis, Localized] : no localized cyanosis

## 2020-01-01 ENCOUNTER — LABORATORY RESULT (OUTPATIENT)
Age: 70
End: 2020-01-01

## 2020-01-01 ENCOUNTER — TRANSCRIPTION ENCOUNTER (OUTPATIENT)
Age: 70
End: 2020-01-01

## 2020-01-01 ENCOUNTER — RESULT REVIEW (OUTPATIENT)
Age: 70
End: 2020-01-01

## 2020-01-01 ENCOUNTER — APPOINTMENT (OUTPATIENT)
Dept: FAMILY MEDICINE | Facility: CLINIC | Age: 70
End: 2020-01-01

## 2020-01-01 ENCOUNTER — APPOINTMENT (OUTPATIENT)
Dept: FAMILY MEDICINE | Facility: CLINIC | Age: 70
End: 2020-01-01
Payer: MEDICARE

## 2020-01-01 ENCOUNTER — OUTPATIENT (OUTPATIENT)
Dept: OUTPATIENT SERVICES | Facility: HOSPITAL | Age: 70
LOS: 1 days | End: 2020-01-01

## 2020-01-01 ENCOUNTER — INPATIENT (INPATIENT)
Facility: HOSPITAL | Age: 70
LOS: 8 days | Discharge: HOME | DRG: 981 | End: 2020-09-05
Attending: INTERNAL MEDICINE | Admitting: FAMILY MEDICINE
Payer: MEDICARE

## 2020-01-01 ENCOUNTER — APPOINTMENT (OUTPATIENT)
Dept: GASTROENTEROLOGY | Facility: CLINIC | Age: 70
End: 2020-01-01
Payer: MEDICARE

## 2020-01-01 ENCOUNTER — RX RENEWAL (OUTPATIENT)
Age: 70
End: 2020-01-01

## 2020-01-01 ENCOUNTER — APPOINTMENT (OUTPATIENT)
Dept: NEUROLOGY | Facility: CLINIC | Age: 70
End: 2020-01-01

## 2020-01-01 ENCOUNTER — APPOINTMENT (OUTPATIENT)
Dept: CT IMAGING | Facility: CLINIC | Age: 70
End: 2020-01-01

## 2020-01-01 ENCOUNTER — APPOINTMENT (OUTPATIENT)
Dept: CT IMAGING | Facility: CLINIC | Age: 70
End: 2020-01-01
Payer: MEDICARE

## 2020-01-01 ENCOUNTER — EMERGENCY (EMERGENCY)
Facility: HOSPITAL | Age: 70
LOS: 1 days | Discharge: HOME | End: 2020-01-01
Attending: EMERGENCY MEDICINE
Payer: MEDICARE

## 2020-01-01 ENCOUNTER — APPOINTMENT (OUTPATIENT)
Dept: PULMONOLOGY | Facility: CLINIC | Age: 70
End: 2020-01-01
Payer: MEDICARE

## 2020-01-01 ENCOUNTER — RX CHANGE (OUTPATIENT)
Age: 70
End: 2020-01-01

## 2020-01-01 ENCOUNTER — APPOINTMENT (OUTPATIENT)
Dept: THORACIC SURGERY | Facility: CLINIC | Age: 70
End: 2020-01-01
Payer: MEDICARE

## 2020-01-01 ENCOUNTER — APPOINTMENT (OUTPATIENT)
Dept: NEUROLOGY | Facility: CLINIC | Age: 70
End: 2020-01-01
Payer: MEDICARE

## 2020-01-01 ENCOUNTER — APPOINTMENT (OUTPATIENT)
Dept: ELECTROPHYSIOLOGY | Facility: CLINIC | Age: 70
End: 2020-01-01

## 2020-01-01 ENCOUNTER — OUTPATIENT (OUTPATIENT)
Dept: OUTPATIENT SERVICES | Facility: HOSPITAL | Age: 70
LOS: 1 days | End: 2020-01-01
Payer: MEDICARE

## 2020-01-01 ENCOUNTER — APPOINTMENT (OUTPATIENT)
Dept: PULMONOLOGY | Facility: CLINIC | Age: 70
End: 2020-01-01

## 2020-01-01 ENCOUNTER — APPOINTMENT (OUTPATIENT)
Dept: CARDIOLOGY | Facility: CLINIC | Age: 70
End: 2020-01-01

## 2020-01-01 ENCOUNTER — APPOINTMENT (OUTPATIENT)
Dept: HEMATOLOGY ONCOLOGY | Facility: CLINIC | Age: 70
End: 2020-01-01

## 2020-01-01 ENCOUNTER — APPOINTMENT (OUTPATIENT)
Dept: GASTROENTEROLOGY | Facility: HOSPITAL | Age: 70
End: 2020-01-01

## 2020-01-01 ENCOUNTER — INPATIENT (INPATIENT)
Facility: HOSPITAL | Age: 70
LOS: 8 days | Discharge: HOME | DRG: 100 | End: 2020-07-13
Attending: INTERNAL MEDICINE | Admitting: HOSPITALIST
Payer: MEDICARE

## 2020-01-01 VITALS
HEART RATE: 82 BPM | DIASTOLIC BLOOD PRESSURE: 60 MMHG | WEIGHT: 109 LBS | SYSTOLIC BLOOD PRESSURE: 108 MMHG | BODY MASS INDEX: 21.4 KG/M2 | HEIGHT: 60 IN

## 2020-01-01 VITALS
SYSTOLIC BLOOD PRESSURE: 113 MMHG | OXYGEN SATURATION: 94 % | DIASTOLIC BLOOD PRESSURE: 57 MMHG | RESPIRATION RATE: 19 BRPM | TEMPERATURE: 98 F | HEART RATE: 87 BPM

## 2020-01-01 VITALS — DIASTOLIC BLOOD PRESSURE: 60 MMHG | HEART RATE: 70 BPM | TEMPERATURE: 98 F | SYSTOLIC BLOOD PRESSURE: 100 MMHG

## 2020-01-01 VITALS — HEIGHT: 60 IN | BODY MASS INDEX: 24.94 KG/M2 | WEIGHT: 127 LBS

## 2020-01-01 VITALS — DIASTOLIC BLOOD PRESSURE: 70 MMHG | OXYGEN SATURATION: 97 % | SYSTOLIC BLOOD PRESSURE: 118 MMHG | HEART RATE: 89 BPM

## 2020-01-01 VITALS
OXYGEN SATURATION: 97 % | HEIGHT: 60 IN | SYSTOLIC BLOOD PRESSURE: 139 MMHG | TEMPERATURE: 97.3 F | BODY MASS INDEX: 23.16 KG/M2 | HEART RATE: 105 BPM | RESPIRATION RATE: 14 BRPM | DIASTOLIC BLOOD PRESSURE: 77 MMHG | WEIGHT: 118 LBS

## 2020-01-01 VITALS
RESPIRATION RATE: 18 BRPM | SYSTOLIC BLOOD PRESSURE: 169 MMHG | HEART RATE: 116 BPM | TEMPERATURE: 99 F | OXYGEN SATURATION: 96 % | DIASTOLIC BLOOD PRESSURE: 76 MMHG

## 2020-01-01 VITALS
SYSTOLIC BLOOD PRESSURE: 144 MMHG | DIASTOLIC BLOOD PRESSURE: 92 MMHG | OXYGEN SATURATION: 92 % | HEART RATE: 126 BPM | RESPIRATION RATE: 20 BRPM | TEMPERATURE: 98 F

## 2020-01-01 VITALS
WEIGHT: 133 LBS | BODY MASS INDEX: 24.48 KG/M2 | OXYGEN SATURATION: 98 % | DIASTOLIC BLOOD PRESSURE: 76 MMHG | HEIGHT: 62 IN | HEART RATE: 96 BPM | TEMPERATURE: 97.3 F | SYSTOLIC BLOOD PRESSURE: 130 MMHG

## 2020-01-01 VITALS
HEIGHT: 60 IN | SYSTOLIC BLOOD PRESSURE: 120 MMHG | BODY MASS INDEX: 23.95 KG/M2 | RESPIRATION RATE: 14 BRPM | DIASTOLIC BLOOD PRESSURE: 74 MMHG | OXYGEN SATURATION: 99 % | HEART RATE: 84 BPM | WEIGHT: 122 LBS

## 2020-01-01 VITALS
HEIGHT: 60 IN | WEIGHT: 120 LBS | DIASTOLIC BLOOD PRESSURE: 50 MMHG | RESPIRATION RATE: 12 BRPM | OXYGEN SATURATION: 94 % | TEMPERATURE: 97.8 F | HEART RATE: 80 BPM | SYSTOLIC BLOOD PRESSURE: 90 MMHG | BODY MASS INDEX: 23.56 KG/M2

## 2020-01-01 VITALS
SYSTOLIC BLOOD PRESSURE: 136 MMHG | TEMPERATURE: 98 F | DIASTOLIC BLOOD PRESSURE: 65 MMHG | RESPIRATION RATE: 18 BRPM | OXYGEN SATURATION: 99 % | HEART RATE: 109 BPM

## 2020-01-01 VITALS — WEIGHT: 110.01 LBS | HEIGHT: 62 IN

## 2020-01-01 VITALS — TEMPERATURE: 97.3 F

## 2020-01-01 VITALS — HEIGHT: 60 IN | BODY MASS INDEX: 23.75 KG/M2 | WEIGHT: 121 LBS

## 2020-01-01 DIAGNOSIS — J44.9 CHRONIC OBSTRUCTIVE PULMONARY DISEASE, UNSPECIFIED: ICD-10-CM

## 2020-01-01 DIAGNOSIS — R56.9 UNSPECIFIED CONVULSIONS: ICD-10-CM

## 2020-01-01 DIAGNOSIS — R06.09 OTHER FORMS OF DYSPNEA: ICD-10-CM

## 2020-01-01 DIAGNOSIS — Z09 ENCOUNTER FOR FOLLOW-UP EXAMINATION AFTER COMPLETED TREATMENT FOR CONDITIONS OTHER THAN MALIGNANT NEOPLASM: ICD-10-CM

## 2020-01-01 DIAGNOSIS — R52 PAIN, UNSPECIFIED: ICD-10-CM

## 2020-01-01 DIAGNOSIS — R20.2 PARESTHESIA OF SKIN: ICD-10-CM

## 2020-01-01 DIAGNOSIS — R63.4 ABNORMAL WEIGHT LOSS: ICD-10-CM

## 2020-01-01 DIAGNOSIS — M54.32 SCIATICA, LEFT SIDE: ICD-10-CM

## 2020-01-01 DIAGNOSIS — Z90.12 ACQUIRED ABSENCE OF LEFT BREAST AND NIPPLE: Chronic | ICD-10-CM

## 2020-01-01 DIAGNOSIS — M81.0 AGE-RELATED OSTEOPOROSIS W/OUT CURRENT PATHOLOGICAL FRACTURE: ICD-10-CM

## 2020-01-01 DIAGNOSIS — B86 SCABIES: ICD-10-CM

## 2020-01-01 DIAGNOSIS — I48.91 UNSPECIFIED ATRIAL FIBRILLATION: ICD-10-CM

## 2020-01-01 DIAGNOSIS — Z87.891 PERSONAL HISTORY OF NICOTINE DEPENDENCE: ICD-10-CM

## 2020-01-01 DIAGNOSIS — R05 COUGH: ICD-10-CM

## 2020-01-01 DIAGNOSIS — K21.0 GASTRO-ESOPHAGEAL REFLUX DISEASE WITH ESOPHAGITIS: ICD-10-CM

## 2020-01-01 DIAGNOSIS — A41.9 SEPSIS, UNSPECIFIED ORGANISM: ICD-10-CM

## 2020-01-01 DIAGNOSIS — G93.40 ENCEPHALOPATHY, UNSPECIFIED: ICD-10-CM

## 2020-01-01 DIAGNOSIS — K22.70 BARRETT'S ESOPHAGUS W/OUT DYSPLASIA: ICD-10-CM

## 2020-01-01 DIAGNOSIS — Z11.59 ENCOUNTER FOR SCREENING FOR OTHER VIRAL DISEASES: ICD-10-CM

## 2020-01-01 DIAGNOSIS — M54.9 DORSALGIA, UNSPECIFIED: ICD-10-CM

## 2020-01-01 DIAGNOSIS — G40.901 EPILEPSY, UNSPECIFIED, NOT INTRACTABLE, WITH STATUS EPILEPTICUS: ICD-10-CM

## 2020-01-01 DIAGNOSIS — Z87.09 PERSONAL HISTORY OF OTHER DISEASES OF THE RESPIRATORY SYSTEM: ICD-10-CM

## 2020-01-01 DIAGNOSIS — F19.10 OTHER PSYCHOACTIVE SUBSTANCE ABUSE, UNCOMPLICATED: ICD-10-CM

## 2020-01-01 DIAGNOSIS — I48.0 PAROXYSMAL ATRIAL FIBRILLATION: ICD-10-CM

## 2020-01-01 DIAGNOSIS — R21 RASH AND OTHER NONSPECIFIC SKIN ERUPTION: ICD-10-CM

## 2020-01-01 DIAGNOSIS — Z00.00 ENCOUNTER FOR GENERAL ADULT MEDICAL EXAMINATION WITHOUT ABNORMAL FINDINGS: ICD-10-CM

## 2020-01-01 DIAGNOSIS — F11.20 OPIOID DEPENDENCE, UNCOMPLICATED: ICD-10-CM

## 2020-01-01 DIAGNOSIS — E03.9 HYPOTHYROIDISM, UNSPECIFIED: ICD-10-CM

## 2020-01-01 DIAGNOSIS — G40.909 EPILEPSY, UNSPECIFIED, NOT INTRACTABLE, W/OUT STATUS EPILEPTICUS: ICD-10-CM

## 2020-01-01 DIAGNOSIS — I10 ESSENTIAL (PRIMARY) HYPERTENSION: ICD-10-CM

## 2020-01-01 DIAGNOSIS — I35.9 NONRHEUMATIC AORTIC VALVE DISORDER, UNSPECIFIED: ICD-10-CM

## 2020-01-01 DIAGNOSIS — G62.9 POLYNEUROPATHY, UNSPECIFIED: ICD-10-CM

## 2020-01-01 DIAGNOSIS — Z20.828 CONTACT WITH AND (SUSPECTED) EXPOSURE TO OTHER VIRAL COMMUNICABLE DISEASES: ICD-10-CM

## 2020-01-01 DIAGNOSIS — G89.29 DORSALGIA, UNSPECIFIED: ICD-10-CM

## 2020-01-01 LAB
24R-OH-CALCIDIOL SERPL-MCNC: 30.6 NG/ML — SIGNIFICANT CHANGE UP (ref 30–80)
25(OH)D3 SERPL-MCNC: 38.7 NG/ML
ABO RH CONFIRMATION: SIGNIFICANT CHANGE UP
ALBUMIN SERPL ELPH-MCNC: 2.8 G/DL — LOW (ref 3.3–5.2)
ALBUMIN SERPL ELPH-MCNC: 3.8 G/DL — SIGNIFICANT CHANGE UP (ref 3.3–5.2)
ALBUMIN SERPL ELPH-MCNC: 4 G/DL
ALBUMIN SERPL ELPH-MCNC: 4.4 G/DL
ALBUMIN SERPL ELPH-MCNC: 4.4 G/DL — SIGNIFICANT CHANGE UP (ref 3.3–5.2)
ALBUMIN SERPL ELPH-MCNC: 4.5 G/DL — SIGNIFICANT CHANGE UP (ref 3.3–5.2)
ALP BLD-CCNC: 57 U/L
ALP BLD-CCNC: 66 U/L
ALP SERPL-CCNC: 128 U/L — HIGH (ref 40–120)
ALP SERPL-CCNC: 53 U/L — SIGNIFICANT CHANGE UP (ref 40–120)
ALP SERPL-CCNC: 74 U/L — SIGNIFICANT CHANGE UP (ref 40–120)
ALP SERPL-CCNC: 83 U/L — SIGNIFICANT CHANGE UP (ref 40–120)
ALT FLD-CCNC: 13 U/L — SIGNIFICANT CHANGE UP
ALT FLD-CCNC: 17 U/L — SIGNIFICANT CHANGE UP
ALT FLD-CCNC: 17 U/L — SIGNIFICANT CHANGE UP
ALT FLD-CCNC: 30 U/L — SIGNIFICANT CHANGE UP
ALT SERPL-CCNC: 10 U/L
ALT SERPL-CCNC: 12 U/L
AMMONIA BLD-MCNC: 21 UMOL/L — SIGNIFICANT CHANGE UP (ref 11–55)
AMPHET UR-MCNC: NEGATIVE
AMPHET UR-MCNC: NEGATIVE — SIGNIFICANT CHANGE UP
ANION GAP SERPL CALC-SCNC: 10 MMOL/L — SIGNIFICANT CHANGE UP (ref 5–17)
ANION GAP SERPL CALC-SCNC: 10 MMOL/L — SIGNIFICANT CHANGE UP (ref 5–17)
ANION GAP SERPL CALC-SCNC: 12 MMOL/L — SIGNIFICANT CHANGE UP (ref 5–17)
ANION GAP SERPL CALC-SCNC: 13 MMOL/L — SIGNIFICANT CHANGE UP (ref 5–17)
ANION GAP SERPL CALC-SCNC: 14 MMOL/L
ANION GAP SERPL CALC-SCNC: 14 MMOL/L — SIGNIFICANT CHANGE UP (ref 5–17)
ANION GAP SERPL CALC-SCNC: 15 MMOL/L
ANION GAP SERPL CALC-SCNC: 17 MMOL/L — SIGNIFICANT CHANGE UP (ref 5–17)
ANION GAP SERPL CALC-SCNC: 18 MMOL/L — HIGH (ref 5–17)
ANION GAP SERPL CALC-SCNC: 20 MMOL/L — HIGH (ref 5–17)
ANION GAP SERPL CALC-SCNC: 8 MMOL/L — SIGNIFICANT CHANGE UP (ref 5–17)
APPEARANCE UR: CLEAR — SIGNIFICANT CHANGE UP
APPEARANCE UR: CLEAR — SIGNIFICANT CHANGE UP
APPEARANCE: ABNORMAL
APTT BLD: 32.6 SEC — SIGNIFICANT CHANGE UP (ref 27.5–35.5)
APTT BLD: 36.8 SEC — HIGH (ref 27.5–35.5)
APTT BLD: 37.2 SEC — HIGH (ref 27.5–35.5)
AST SERPL-CCNC: 17 U/L
AST SERPL-CCNC: 22 U/L
AST SERPL-CCNC: 23 U/L — SIGNIFICANT CHANGE UP
AST SERPL-CCNC: 26 U/L — SIGNIFICANT CHANGE UP
AST SERPL-CCNC: 28 U/L — SIGNIFICANT CHANGE UP
AST SERPL-CCNC: 42 U/L — HIGH
B BURGDOR IGG+IGM SER QL IB: NORMAL
BACTERIA # UR AUTO: ABNORMAL
BARBITURATES UR SCN-MCNC: NEGATIVE — SIGNIFICANT CHANGE UP
BARBITURATES UR-MCNC: NEGATIVE
BASE EXCESS BLDV CALC-SCNC: 0.7 MMOL/L — SIGNIFICANT CHANGE UP (ref -2–2)
BASOPHILS # BLD AUTO: 0.02 K/UL — SIGNIFICANT CHANGE UP (ref 0–0.2)
BASOPHILS # BLD AUTO: 0.02 K/UL — SIGNIFICANT CHANGE UP (ref 0–0.2)
BASOPHILS # BLD AUTO: 0.04 K/UL
BASOPHILS # BLD AUTO: 0.04 K/UL — SIGNIFICANT CHANGE UP (ref 0–0.2)
BASOPHILS # BLD AUTO: 0.07 K/UL
BASOPHILS # BLD AUTO: 0.17 K/UL — SIGNIFICANT CHANGE UP (ref 0–0.2)
BASOPHILS NFR BLD AUTO: 0.1 % — SIGNIFICANT CHANGE UP (ref 0–2)
BASOPHILS NFR BLD AUTO: 0.2 % — SIGNIFICANT CHANGE UP (ref 0–2)
BASOPHILS NFR BLD AUTO: 0.3 % — SIGNIFICANT CHANGE UP (ref 0–2)
BASOPHILS NFR BLD AUTO: 0.5 %
BASOPHILS NFR BLD AUTO: 0.7 %
BASOPHILS NFR BLD AUTO: 0.9 % — SIGNIFICANT CHANGE UP (ref 0–2)
BENZODIAZ UR-MCNC: NEGATIVE
BENZODIAZ UR-MCNC: NEGATIVE — SIGNIFICANT CHANGE UP
BILIRUB DIRECT SERPL-MCNC: <0.1 MG/DL — SIGNIFICANT CHANGE UP (ref 0–0.3)
BILIRUB INDIRECT FLD-MCNC: SIGNIFICANT CHANGE UP MG/DL (ref 0.2–1)
BILIRUB SERPL-MCNC: 0.2 MG/DL
BILIRUB SERPL-MCNC: 0.2 MG/DL
BILIRUB SERPL-MCNC: 0.2 MG/DL — LOW (ref 0.4–2)
BILIRUB SERPL-MCNC: 0.3 MG/DL — LOW (ref 0.4–2)
BILIRUB SERPL-MCNC: 0.6 MG/DL — SIGNIFICANT CHANGE UP (ref 0.4–2)
BILIRUB SERPL-MCNC: <0.2 MG/DL — LOW (ref 0.4–2)
BILIRUB UR-MCNC: NEGATIVE — SIGNIFICANT CHANGE UP
BILIRUB UR-MCNC: NEGATIVE — SIGNIFICANT CHANGE UP
BILIRUBIN URINE: NEGATIVE
BLD GP AB SCN SERPL QL: SIGNIFICANT CHANGE UP
BLD GP AB SCN SERPL QL: SIGNIFICANT CHANGE UP
BLOOD URINE: NEGATIVE
BUN SERPL-MCNC: 10 MG/DL — SIGNIFICANT CHANGE UP (ref 8–20)
BUN SERPL-MCNC: 10 MG/DL — SIGNIFICANT CHANGE UP (ref 8–20)
BUN SERPL-MCNC: 11 MG/DL — SIGNIFICANT CHANGE UP (ref 8–20)
BUN SERPL-MCNC: 13 MG/DL — SIGNIFICANT CHANGE UP (ref 8–20)
BUN SERPL-MCNC: 13 MG/DL — SIGNIFICANT CHANGE UP (ref 8–20)
BUN SERPL-MCNC: 14 MG/DL — SIGNIFICANT CHANGE UP (ref 8–20)
BUN SERPL-MCNC: 14 MG/DL — SIGNIFICANT CHANGE UP (ref 8–20)
BUN SERPL-MCNC: 15 MG/DL — SIGNIFICANT CHANGE UP (ref 8–20)
BUN SERPL-MCNC: 16 MG/DL — SIGNIFICANT CHANGE UP (ref 8–20)
BUN SERPL-MCNC: 17 MG/DL
BUN SERPL-MCNC: 17 MG/DL — SIGNIFICANT CHANGE UP (ref 8–20)
BUN SERPL-MCNC: 18 MG/DL — SIGNIFICANT CHANGE UP (ref 8–20)
BUN SERPL-MCNC: 24 MG/DL
CA-I SERPL-SCNC: 1 MMOL/L — LOW (ref 1.15–1.33)
CALCIUM SERPL-MCNC: 10.2 MG/DL — SIGNIFICANT CHANGE UP (ref 8.6–10.2)
CALCIUM SERPL-MCNC: 10.3 MG/DL
CALCIUM SERPL-MCNC: 10.4 MG/DL — HIGH (ref 8.6–10.2)
CALCIUM SERPL-MCNC: 8.5 MG/DL — LOW (ref 8.6–10.2)
CALCIUM SERPL-MCNC: 8.8 MG/DL — SIGNIFICANT CHANGE UP (ref 8.6–10.2)
CALCIUM SERPL-MCNC: 8.9 MG/DL — SIGNIFICANT CHANGE UP (ref 8.6–10.2)
CALCIUM SERPL-MCNC: 8.9 MG/DL — SIGNIFICANT CHANGE UP (ref 8.6–10.2)
CALCIUM SERPL-MCNC: 9.1 MG/DL — SIGNIFICANT CHANGE UP (ref 8.6–10.2)
CALCIUM SERPL-MCNC: 9.1 MG/DL — SIGNIFICANT CHANGE UP (ref 8.6–10.2)
CALCIUM SERPL-MCNC: 9.3 MG/DL — SIGNIFICANT CHANGE UP (ref 8.6–10.2)
CALCIUM SERPL-MCNC: 9.5 MG/DL
CALCIUM SERPL-MCNC: 9.5 MG/DL — SIGNIFICANT CHANGE UP (ref 8.6–10.2)
CALCIUM SERPL-MCNC: 9.7 MG/DL — SIGNIFICANT CHANGE UP (ref 8.6–10.2)
CHLORIDE BLDV-SCNC: 98 MMOL/L — SIGNIFICANT CHANGE UP (ref 98–107)
CHLORIDE SERPL-SCNC: 100 MMOL/L — SIGNIFICANT CHANGE UP (ref 98–107)
CHLORIDE SERPL-SCNC: 100 MMOL/L — SIGNIFICANT CHANGE UP (ref 98–107)
CHLORIDE SERPL-SCNC: 102 MMOL/L
CHLORIDE SERPL-SCNC: 102 MMOL/L — SIGNIFICANT CHANGE UP (ref 98–107)
CHLORIDE SERPL-SCNC: 104 MMOL/L — SIGNIFICANT CHANGE UP (ref 98–107)
CHLORIDE SERPL-SCNC: 104 MMOL/L — SIGNIFICANT CHANGE UP (ref 98–107)
CHLORIDE SERPL-SCNC: 105 MMOL/L — SIGNIFICANT CHANGE UP (ref 98–107)
CHLORIDE SERPL-SCNC: 105 MMOL/L — SIGNIFICANT CHANGE UP (ref 98–107)
CHLORIDE SERPL-SCNC: 92 MMOL/L — LOW (ref 98–107)
CHLORIDE SERPL-SCNC: 93 MMOL/L — LOW (ref 98–107)
CHLORIDE SERPL-SCNC: 96 MMOL/L — LOW (ref 98–107)
CHLORIDE SERPL-SCNC: 97 MMOL/L
CHLORIDE SERPL-SCNC: 99 MMOL/L — SIGNIFICANT CHANGE UP (ref 98–107)
CK SERPL-CCNC: 34 U/L — SIGNIFICANT CHANGE UP (ref 25–170)
CO2 SERPL-SCNC: 22 MMOL/L — SIGNIFICANT CHANGE UP (ref 22–29)
CO2 SERPL-SCNC: 22 MMOL/L — SIGNIFICANT CHANGE UP (ref 22–29)
CO2 SERPL-SCNC: 23 MMOL/L — SIGNIFICANT CHANGE UP (ref 22–29)
CO2 SERPL-SCNC: 23 MMOL/L — SIGNIFICANT CHANGE UP (ref 22–29)
CO2 SERPL-SCNC: 24 MMOL/L — SIGNIFICANT CHANGE UP (ref 22–29)
CO2 SERPL-SCNC: 25 MMOL/L — SIGNIFICANT CHANGE UP (ref 22–29)
CO2 SERPL-SCNC: 26 MMOL/L
CO2 SERPL-SCNC: 26 MMOL/L
CO2 SERPL-SCNC: 26 MMOL/L — SIGNIFICANT CHANGE UP (ref 22–29)
CO2 SERPL-SCNC: 27 MMOL/L — SIGNIFICANT CHANGE UP (ref 22–29)
CO2 SERPL-SCNC: 28 MMOL/L — SIGNIFICANT CHANGE UP (ref 22–29)
CO2 SERPL-SCNC: 29 MMOL/L — SIGNIFICANT CHANGE UP (ref 22–29)
CO2 SERPL-SCNC: 29 MMOL/L — SIGNIFICANT CHANGE UP (ref 22–29)
COCAINE METAB.OTHER UR-MCNC: NEGATIVE
COCAINE METAB.OTHER UR-MCNC: NEGATIVE — SIGNIFICANT CHANGE UP
COLOR SPEC: YELLOW — SIGNIFICANT CHANGE UP
COLOR SPEC: YELLOW — SIGNIFICANT CHANGE UP
COLOR: YELLOW
CREAT SERPL-MCNC: 0.51 MG/DL — SIGNIFICANT CHANGE UP (ref 0.5–1.3)
CREAT SERPL-MCNC: 0.57 MG/DL — SIGNIFICANT CHANGE UP (ref 0.5–1.3)
CREAT SERPL-MCNC: 0.62 MG/DL — SIGNIFICANT CHANGE UP (ref 0.5–1.3)
CREAT SERPL-MCNC: 0.64 MG/DL — SIGNIFICANT CHANGE UP (ref 0.5–1.3)
CREAT SERPL-MCNC: 0.68 MG/DL — SIGNIFICANT CHANGE UP (ref 0.5–1.3)
CREAT SERPL-MCNC: 0.71 MG/DL
CREAT SERPL-MCNC: 0.71 MG/DL — SIGNIFICANT CHANGE UP (ref 0.5–1.3)
CREAT SERPL-MCNC: 0.72 MG/DL — SIGNIFICANT CHANGE UP (ref 0.5–1.3)
CREAT SERPL-MCNC: 0.74 MG/DL — SIGNIFICANT CHANGE UP (ref 0.5–1.3)
CREAT SERPL-MCNC: 1.02 MG/DL — SIGNIFICANT CHANGE UP (ref 0.5–1.3)
CREAT SERPL-MCNC: 1.13 MG/DL
CREATININE, URINE: 160.2 MG/DL
CULTURE RESULTS: NO GROWTH — SIGNIFICANT CHANGE UP
CULTURE RESULTS: SIGNIFICANT CHANGE UP
DIFF PNL FLD: ABNORMAL
DIFF PNL FLD: NEGATIVE — SIGNIFICANT CHANGE UP
EOSINOPHIL # BLD AUTO: 0 K/UL — SIGNIFICANT CHANGE UP (ref 0–0.5)
EOSINOPHIL # BLD AUTO: 0 K/UL — SIGNIFICANT CHANGE UP (ref 0–0.5)
EOSINOPHIL # BLD AUTO: 0.02 K/UL — SIGNIFICANT CHANGE UP (ref 0–0.5)
EOSINOPHIL # BLD AUTO: 0.08 K/UL
EOSINOPHIL # BLD AUTO: 0.17 K/UL — SIGNIFICANT CHANGE UP (ref 0–0.5)
EOSINOPHIL # BLD AUTO: 0.28 K/UL
EOSINOPHIL NFR BLD AUTO: 0 % — SIGNIFICANT CHANGE UP (ref 0–6)
EOSINOPHIL NFR BLD AUTO: 0 % — SIGNIFICANT CHANGE UP (ref 0–6)
EOSINOPHIL NFR BLD AUTO: 0.1 % — SIGNIFICANT CHANGE UP (ref 0–6)
EOSINOPHIL NFR BLD AUTO: 0.9 % — SIGNIFICANT CHANGE UP (ref 0–6)
EOSINOPHIL NFR BLD AUTO: 1.4 %
EOSINOPHIL NFR BLD AUTO: 2 %
EPI CELLS # UR: SIGNIFICANT CHANGE UP
EPI CELLS # UR: SIGNIFICANT CHANGE UP
ETHANOL SERPL-MCNC: <10 MG/DL — SIGNIFICANT CHANGE UP
ETHANOL SERPL-MCNC: <10 MG/DL — SIGNIFICANT CHANGE UP
FERRITIN SERPL-MCNC: 111 NG/ML
FERRITIN SERPL-MCNC: 76 NG/ML — SIGNIFICANT CHANGE UP (ref 15–150)
FOLATE SERPL-MCNC: 15.3 NG/ML — SIGNIFICANT CHANGE UP
FOLATE SERPL-MCNC: 18.2 NG/ML
FOLATE SERPL-MCNC: 7.6 NG/ML — SIGNIFICANT CHANGE UP
FOLATE SERPL-MCNC: 8.2 NG/ML
GAS PNL BLDV: 135 MMOL/L — SIGNIFICANT CHANGE UP (ref 135–145)
GAS PNL BLDV: SIGNIFICANT CHANGE UP
GAS PNL BLDV: SIGNIFICANT CHANGE UP
GIANT PLATELETS BLD QL SMEAR: PRESENT — SIGNIFICANT CHANGE UP
GLUCOSE BLDV-MCNC: 160 MG/DL — HIGH (ref 70–99)
GLUCOSE QUALITATIVE U: NEGATIVE
GLUCOSE SERPL-MCNC: 106 MG/DL — HIGH (ref 70–99)
GLUCOSE SERPL-MCNC: 107 MG/DL — HIGH (ref 70–99)
GLUCOSE SERPL-MCNC: 112 MG/DL
GLUCOSE SERPL-MCNC: 116 MG/DL — HIGH (ref 70–99)
GLUCOSE SERPL-MCNC: 125 MG/DL — HIGH (ref 70–99)
GLUCOSE SERPL-MCNC: 164 MG/DL — HIGH (ref 70–99)
GLUCOSE SERPL-MCNC: 76 MG/DL
GLUCOSE SERPL-MCNC: 89 MG/DL — SIGNIFICANT CHANGE UP (ref 70–99)
GLUCOSE SERPL-MCNC: 89 MG/DL — SIGNIFICANT CHANGE UP (ref 70–99)
GLUCOSE SERPL-MCNC: 90 MG/DL — SIGNIFICANT CHANGE UP (ref 70–99)
GLUCOSE SERPL-MCNC: 91 MG/DL — SIGNIFICANT CHANGE UP (ref 70–99)
GLUCOSE SERPL-MCNC: 91 MG/DL — SIGNIFICANT CHANGE UP (ref 70–99)
GLUCOSE SERPL-MCNC: 99 MG/DL — SIGNIFICANT CHANGE UP (ref 70–99)
GLUCOSE UR QL: 100 MG/DL
GLUCOSE UR QL: NEGATIVE MG/DL — SIGNIFICANT CHANGE UP
HCO3 BLDV-SCNC: 25 MMOL/L — SIGNIFICANT CHANGE UP (ref 21–29)
HCT VFR BLD CALC: 32.5 % — LOW (ref 34.5–45)
HCT VFR BLD CALC: 33.3 % — LOW (ref 34.5–45)
HCT VFR BLD CALC: 34.2 % — LOW (ref 34.5–45)
HCT VFR BLD CALC: 34.8 %
HCT VFR BLD CALC: 35.2 % — SIGNIFICANT CHANGE UP (ref 34.5–45)
HCT VFR BLD CALC: 35.7 % — SIGNIFICANT CHANGE UP (ref 34.5–45)
HCT VFR BLD CALC: 36.4 % — SIGNIFICANT CHANGE UP (ref 34.5–45)
HCT VFR BLD CALC: 38.4 % — SIGNIFICANT CHANGE UP (ref 34.5–45)
HCT VFR BLD CALC: 40.9 %
HCT VFR BLD CALC: 41.6 % — SIGNIFICANT CHANGE UP (ref 34.5–45)
HCT VFR BLD CALC: 42.3 % — SIGNIFICANT CHANGE UP (ref 34.5–45)
HCT VFR BLDA CALC: 38 — LOW (ref 39–50)
HCV AB S/CO SERPL IA: 0.12 S/CO — SIGNIFICANT CHANGE UP (ref 0–0.99)
HCV AB SERPL-IMP: SIGNIFICANT CHANGE UP
HGB BLD CALC-MCNC: 12.3 G/DL — SIGNIFICANT CHANGE UP (ref 11.5–15.5)
HGB BLD-MCNC: 10 G/DL
HGB BLD-MCNC: 10.5 G/DL — LOW (ref 11.5–15.5)
HGB BLD-MCNC: 10.6 G/DL — LOW (ref 11.5–15.5)
HGB BLD-MCNC: 11.2 G/DL — LOW (ref 11.5–15.5)
HGB BLD-MCNC: 11.4 G/DL — LOW (ref 11.5–15.5)
HGB BLD-MCNC: 11.8 G/DL — SIGNIFICANT CHANGE UP (ref 11.5–15.5)
HGB BLD-MCNC: 12.3 G/DL — SIGNIFICANT CHANGE UP (ref 11.5–15.5)
HGB BLD-MCNC: 12.7 G/DL
HGB BLD-MCNC: 12.8 G/DL — SIGNIFICANT CHANGE UP (ref 11.5–15.5)
HGB BLD-MCNC: 13.5 G/DL — SIGNIFICANT CHANGE UP (ref 11.5–15.5)
HGB BLD-MCNC: 9.8 G/DL — LOW (ref 11.5–15.5)
IMM GRANULOCYTES NFR BLD AUTO: 0.2 %
IMM GRANULOCYTES NFR BLD AUTO: 0.4 % — SIGNIFICANT CHANGE UP (ref 0–1.5)
IMM GRANULOCYTES NFR BLD AUTO: 0.5 % — SIGNIFICANT CHANGE UP (ref 0–1.5)
IMM GRANULOCYTES NFR BLD AUTO: 0.5 % — SIGNIFICANT CHANGE UP (ref 0–1.5)
IMM GRANULOCYTES NFR BLD AUTO: 0.7 %
INR BLD: 1.24 RATIO — HIGH (ref 0.88–1.16)
INR BLD: 1.36 RATIO — HIGH (ref 0.88–1.16)
INR BLD: 1.36 RATIO — HIGH (ref 0.88–1.16)
INR BLD: 1.37 RATIO — HIGH (ref 0.88–1.16)
IRON SATN MFR SERPL: 16 % — SIGNIFICANT CHANGE UP (ref 14–50)
IRON SATN MFR SERPL: 46 UG/DL — SIGNIFICANT CHANGE UP (ref 37–145)
IRON SATN MFR SERPL: 7 %
IRON SERPL-MCNC: 17 UG/DL
KETONES UR-MCNC: ABNORMAL
KETONES UR-MCNC: ABNORMAL
KETONES URINE: NORMAL
LACTATE BLDV-MCNC: 1.2 MMOL/L — SIGNIFICANT CHANGE UP (ref 0.5–2)
LACTATE BLDV-MCNC: 1.9 MMOL/L — SIGNIFICANT CHANGE UP (ref 0.5–2)
LACTATE BLDV-MCNC: 2.3 MMOL/L — HIGH (ref 0.5–2)
LEUKOCYTE ESTERASE UR-ACNC: ABNORMAL
LEUKOCYTE ESTERASE UR-ACNC: NEGATIVE — SIGNIFICANT CHANGE UP
LEUKOCYTE ESTERASE URINE: ABNORMAL
LIDOCAIN IGE QN: 68 U/L — HIGH (ref 22–51)
LIDOCAIN IGE QN: 82 U/L — HIGH (ref 22–51)
LYMPHOCYTES # BLD AUTO: 1.19 K/UL — SIGNIFICANT CHANGE UP (ref 1–3.3)
LYMPHOCYTES # BLD AUTO: 1.2 K/UL — SIGNIFICANT CHANGE UP (ref 1–3.3)
LYMPHOCYTES # BLD AUTO: 1.29 K/UL — SIGNIFICANT CHANGE UP (ref 1–3.3)
LYMPHOCYTES # BLD AUTO: 1.38 K/UL
LYMPHOCYTES # BLD AUTO: 1.49 K/UL
LYMPHOCYTES # BLD AUTO: 10.4 % — LOW (ref 13–44)
LYMPHOCYTES # BLD AUTO: 23 % — SIGNIFICANT CHANGE UP (ref 13–44)
LYMPHOCYTES # BLD AUTO: 4.46 K/UL — HIGH (ref 1–3.3)
LYMPHOCYTES # BLD AUTO: 7.1 % — LOW (ref 13–44)
LYMPHOCYTES # BLD AUTO: 8.5 % — LOW (ref 13–44)
LYMPHOCYTES NFR BLD AUTO: 10.6 %
LYMPHOCYTES NFR BLD AUTO: 24 %
MAGNESIUM SERPL-MCNC: 1.7 MG/DL — SIGNIFICANT CHANGE UP (ref 1.6–2.6)
MAGNESIUM SERPL-MCNC: 1.8 MG/DL — SIGNIFICANT CHANGE UP (ref 1.6–2.6)
MAGNESIUM SERPL-MCNC: 1.8 MG/DL — SIGNIFICANT CHANGE UP (ref 1.6–2.6)
MAGNESIUM SERPL-MCNC: 1.9 MG/DL — SIGNIFICANT CHANGE UP (ref 1.6–2.6)
MAGNESIUM SERPL-MCNC: 2 MG/DL — SIGNIFICANT CHANGE UP (ref 1.6–2.6)
MAGNESIUM SERPL-MCNC: 2.1 MG/DL — SIGNIFICANT CHANGE UP (ref 1.6–2.6)
MAN DIFF?: NORMAL
MAN DIFF?: NORMAL
MANUAL SMEAR VERIFICATION: SIGNIFICANT CHANGE UP
MCHC RBC-ENTMCNC: 28.7 GM/DL
MCHC RBC-ENTMCNC: 29.5 PG — SIGNIFICANT CHANGE UP (ref 27–34)
MCHC RBC-ENTMCNC: 29.6 PG — SIGNIFICANT CHANGE UP (ref 27–34)
MCHC RBC-ENTMCNC: 29.7 PG
MCHC RBC-ENTMCNC: 29.8 PG — SIGNIFICANT CHANGE UP (ref 27–34)
MCHC RBC-ENTMCNC: 30 PG — SIGNIFICANT CHANGE UP (ref 27–34)
MCHC RBC-ENTMCNC: 30.1 PG — SIGNIFICANT CHANGE UP (ref 27–34)
MCHC RBC-ENTMCNC: 30.2 GM/DL — LOW (ref 32–36)
MCHC RBC-ENTMCNC: 30.2 PG
MCHC RBC-ENTMCNC: 30.3 PG — SIGNIFICANT CHANGE UP (ref 27–34)
MCHC RBC-ENTMCNC: 30.3 PG — SIGNIFICANT CHANGE UP (ref 27–34)
MCHC RBC-ENTMCNC: 30.4 PG — SIGNIFICANT CHANGE UP (ref 27–34)
MCHC RBC-ENTMCNC: 30.5 PG — SIGNIFICANT CHANGE UP (ref 27–34)
MCHC RBC-ENTMCNC: 30.8 GM/DL — LOW (ref 32–36)
MCHC RBC-ENTMCNC: 31 GM/DL — LOW (ref 32–36)
MCHC RBC-ENTMCNC: 31.1 GM/DL
MCHC RBC-ENTMCNC: 31.5 GM/DL — LOW (ref 32–36)
MCHC RBC-ENTMCNC: 31.8 GM/DL — LOW (ref 32–36)
MCHC RBC-ENTMCNC: 31.9 GM/DL — LOW (ref 32–36)
MCHC RBC-ENTMCNC: 31.9 GM/DL — LOW (ref 32–36)
MCHC RBC-ENTMCNC: 32 GM/DL — SIGNIFICANT CHANGE UP (ref 32–36)
MCHC RBC-ENTMCNC: 32.4 GM/DL — SIGNIFICANT CHANGE UP (ref 32–36)
MCV RBC AUTO: 105.1 FL
MCV RBC AUTO: 91.9 FL — SIGNIFICANT CHANGE UP (ref 80–100)
MCV RBC AUTO: 93.8 FL — SIGNIFICANT CHANGE UP (ref 80–100)
MCV RBC AUTO: 94.2 FL — SIGNIFICANT CHANGE UP (ref 80–100)
MCV RBC AUTO: 94.8 FL — SIGNIFICANT CHANGE UP (ref 80–100)
MCV RBC AUTO: 94.9 FL — SIGNIFICANT CHANGE UP (ref 80–100)
MCV RBC AUTO: 95.1 FL — SIGNIFICANT CHANGE UP (ref 80–100)
MCV RBC AUTO: 95.8 FL
MCV RBC AUTO: 96.9 FL — SIGNIFICANT CHANGE UP (ref 80–100)
MCV RBC AUTO: 97.9 FL — SIGNIFICANT CHANGE UP (ref 80–100)
MCV RBC AUTO: 99 FL — SIGNIFICANT CHANGE UP (ref 80–100)
METHADONE UR-MCNC: NEGATIVE
METHADONE UR-MCNC: NEGATIVE — SIGNIFICANT CHANGE UP
METHAQUALONE UR-MCNC: NEGATIVE
MONOCYTES # BLD AUTO: 0.48 K/UL — SIGNIFICANT CHANGE UP (ref 0–0.9)
MONOCYTES # BLD AUTO: 0.56 K/UL
MONOCYTES # BLD AUTO: 0.63 K/UL — SIGNIFICANT CHANGE UP (ref 0–0.9)
MONOCYTES # BLD AUTO: 0.8 K/UL
MONOCYTES # BLD AUTO: 0.8 K/UL — SIGNIFICANT CHANGE UP (ref 0–0.9)
MONOCYTES # BLD AUTO: 0.85 K/UL — SIGNIFICANT CHANGE UP (ref 0–0.9)
MONOCYTES NFR BLD AUTO: 2.8 % — SIGNIFICANT CHANGE UP (ref 2–14)
MONOCYTES NFR BLD AUTO: 4.4 % — SIGNIFICANT CHANGE UP (ref 2–14)
MONOCYTES NFR BLD AUTO: 4.5 % — SIGNIFICANT CHANGE UP (ref 2–14)
MONOCYTES NFR BLD AUTO: 5.7 %
MONOCYTES NFR BLD AUTO: 6.5 % — SIGNIFICANT CHANGE UP (ref 2–14)
MONOCYTES NFR BLD AUTO: 9.7 %
NEUTROPHILS # BLD AUTO: 10.19 K/UL — HIGH (ref 1.8–7.4)
NEUTROPHILS # BLD AUTO: 11.31 K/UL
NEUTROPHILS # BLD AUTO: 11.99 K/UL — HIGH (ref 1.8–7.4)
NEUTROPHILS # BLD AUTO: 13.56 K/UL — HIGH (ref 1.8–7.4)
NEUTROPHILS # BLD AUTO: 15.1 K/UL — HIGH (ref 1.8–7.4)
NEUTROPHILS # BLD AUTO: 3.68 K/UL
NEUTROPHILS NFR BLD AUTO: 64 %
NEUTROPHILS NFR BLD AUTO: 69.9 % — SIGNIFICANT CHANGE UP (ref 43–77)
NEUTROPHILS NFR BLD AUTO: 80.5 %
NEUTROPHILS NFR BLD AUTO: 82.4 % — HIGH (ref 43–77)
NEUTROPHILS NFR BLD AUTO: 86.2 % — HIGH (ref 43–77)
NEUTROPHILS NFR BLD AUTO: 89.5 % — HIGH (ref 43–77)
NITRITE UR-MCNC: NEGATIVE — SIGNIFICANT CHANGE UP
NITRITE UR-MCNC: NEGATIVE — SIGNIFICANT CHANGE UP
NITRITE URINE: NEGATIVE
OPIATES UR-MCNC: NEGATIVE
OPIATES UR-MCNC: NEGATIVE — SIGNIFICANT CHANGE UP
OTHER CELLS CSF MANUAL: 16 ML/DL — SIGNIFICANT CHANGE UP (ref 16–22)
PCO2 BLDV: 30 MMHG — LOW (ref 35–50)
PCP SPEC-MCNC: SIGNIFICANT CHANGE UP
PCP UR-MCNC: NEGATIVE
PCP UR-MCNC: NEGATIVE — SIGNIFICANT CHANGE UP
PH BLDV: 7.5 — HIGH (ref 7.32–7.43)
PH UR: 6 — SIGNIFICANT CHANGE UP (ref 5–8)
PH UR: 6 — SIGNIFICANT CHANGE UP (ref 5–8)
PH URINE: 5.5
PHOSPHATE SERPL-MCNC: 2.3 MG/DL — LOW (ref 2.4–4.7)
PHOSPHATE SERPL-MCNC: 2.5 MG/DL — SIGNIFICANT CHANGE UP (ref 2.4–4.7)
PHOSPHATE SERPL-MCNC: 2.5 MG/DL — SIGNIFICANT CHANGE UP (ref 2.4–4.7)
PHOSPHATE SERPL-MCNC: 3.4 MG/DL — SIGNIFICANT CHANGE UP (ref 2.4–4.7)
PLAT MORPH BLD: NORMAL — SIGNIFICANT CHANGE UP
PLATELET # BLD AUTO: 236 K/UL — SIGNIFICANT CHANGE UP (ref 150–400)
PLATELET # BLD AUTO: 251 K/UL — SIGNIFICANT CHANGE UP (ref 150–400)
PLATELET # BLD AUTO: 310 K/UL
PLATELET # BLD AUTO: 313 K/UL — SIGNIFICANT CHANGE UP (ref 150–400)
PLATELET # BLD AUTO: 326 K/UL — SIGNIFICANT CHANGE UP (ref 150–400)
PLATELET # BLD AUTO: 358 K/UL — SIGNIFICANT CHANGE UP (ref 150–400)
PLATELET # BLD AUTO: 369 K/UL
PLATELET # BLD AUTO: 369 K/UL — SIGNIFICANT CHANGE UP (ref 150–400)
PLATELET # BLD AUTO: 394 K/UL — SIGNIFICANT CHANGE UP (ref 150–400)
PLATELET # BLD AUTO: 431 K/UL — HIGH (ref 150–400)
PLATELET # BLD AUTO: SIGNIFICANT CHANGE UP K/UL (ref 150–400)
PO2 BLDV: 96 MMHG — HIGH (ref 25–45)
POTASSIUM BLDV-SCNC: 4.7 MMOL/L — HIGH (ref 3.4–4.5)
POTASSIUM SERPL-MCNC: 3.4 MMOL/L — LOW (ref 3.5–5.3)
POTASSIUM SERPL-MCNC: 3.5 MMOL/L — SIGNIFICANT CHANGE UP (ref 3.5–5.3)
POTASSIUM SERPL-MCNC: 3.6 MMOL/L — SIGNIFICANT CHANGE UP (ref 3.5–5.3)
POTASSIUM SERPL-MCNC: 3.9 MMOL/L — SIGNIFICANT CHANGE UP (ref 3.5–5.3)
POTASSIUM SERPL-MCNC: 3.9 MMOL/L — SIGNIFICANT CHANGE UP (ref 3.5–5.3)
POTASSIUM SERPL-MCNC: 4 MMOL/L — SIGNIFICANT CHANGE UP (ref 3.5–5.3)
POTASSIUM SERPL-MCNC: 4.2 MMOL/L — SIGNIFICANT CHANGE UP (ref 3.5–5.3)
POTASSIUM SERPL-MCNC: 4.2 MMOL/L — SIGNIFICANT CHANGE UP (ref 3.5–5.3)
POTASSIUM SERPL-MCNC: 4.7 MMOL/L — SIGNIFICANT CHANGE UP (ref 3.5–5.3)
POTASSIUM SERPL-MCNC: 4.8 MMOL/L — SIGNIFICANT CHANGE UP (ref 3.5–5.3)
POTASSIUM SERPL-MCNC: 5.2 MMOL/L — SIGNIFICANT CHANGE UP (ref 3.5–5.3)
POTASSIUM SERPL-SCNC: 3.4 MMOL/L — LOW (ref 3.5–5.3)
POTASSIUM SERPL-SCNC: 3.5 MMOL/L — SIGNIFICANT CHANGE UP (ref 3.5–5.3)
POTASSIUM SERPL-SCNC: 3.6 MMOL/L — SIGNIFICANT CHANGE UP (ref 3.5–5.3)
POTASSIUM SERPL-SCNC: 3.9 MMOL/L — SIGNIFICANT CHANGE UP (ref 3.5–5.3)
POTASSIUM SERPL-SCNC: 3.9 MMOL/L — SIGNIFICANT CHANGE UP (ref 3.5–5.3)
POTASSIUM SERPL-SCNC: 4 MMOL/L — SIGNIFICANT CHANGE UP (ref 3.5–5.3)
POTASSIUM SERPL-SCNC: 4.2 MMOL/L — SIGNIFICANT CHANGE UP (ref 3.5–5.3)
POTASSIUM SERPL-SCNC: 4.2 MMOL/L — SIGNIFICANT CHANGE UP (ref 3.5–5.3)
POTASSIUM SERPL-SCNC: 4.7 MMOL/L — SIGNIFICANT CHANGE UP (ref 3.5–5.3)
POTASSIUM SERPL-SCNC: 4.8 MMOL/L — SIGNIFICANT CHANGE UP (ref 3.5–5.3)
POTASSIUM SERPL-SCNC: 4.9 MMOL/L
POTASSIUM SERPL-SCNC: 5.2 MMOL/L — SIGNIFICANT CHANGE UP (ref 3.5–5.3)
POTASSIUM SERPL-SCNC: 5.5 MMOL/L
PROCALCITONIN SERPL-MCNC: 0.09 NG/ML — SIGNIFICANT CHANGE UP (ref 0.02–0.1)
PROPOXYPH UR QL: NEGATIVE
PROT SERPL-MCNC: 5.7 G/DL — LOW (ref 6.6–8.7)
PROT SERPL-MCNC: 7.3 G/DL
PROT SERPL-MCNC: 7.5 G/DL
PROT SERPL-MCNC: 8.2 G/DL — SIGNIFICANT CHANGE UP (ref 6.6–8.7)
PROT SERPL-MCNC: 8.3 G/DL — SIGNIFICANT CHANGE UP (ref 6.6–8.7)
PROT SERPL-MCNC: 8.7 G/DL — SIGNIFICANT CHANGE UP (ref 6.6–8.7)
PROT UR-MCNC: 100 MG/DL
PROT UR-MCNC: 30 MG/DL
PROTEIN URINE: ABNORMAL
PROTHROM AB SERPL-ACNC: 14.2 SEC — HIGH (ref 10.6–13.6)
PROTHROM AB SERPL-ACNC: 15.5 SEC — HIGH (ref 10.6–13.6)
PROTHROM AB SERPL-ACNC: 15.5 SEC — HIGH (ref 10.6–13.6)
PROTHROM AB SERPL-ACNC: 15.7 SEC — HIGH (ref 10.6–13.6)
RBC # BLD: 3.31 M/UL
RBC # BLD: 3.32 M/UL — LOW (ref 3.8–5.2)
RBC # BLD: 3.53 M/UL — LOW (ref 3.8–5.2)
RBC # BLD: 3.55 M/UL — LOW (ref 3.8–5.2)
RBC # BLD: 3.7 M/UL — LOW (ref 3.8–5.2)
RBC # BLD: 3.76 M/UL — LOW (ref 3.8–5.2)
RBC # BLD: 3.96 M/UL — SIGNIFICANT CHANGE UP (ref 3.8–5.2)
RBC # BLD: 4.05 M/UL — SIGNIFICANT CHANGE UP (ref 3.8–5.2)
RBC # BLD: 4.2 M/UL — SIGNIFICANT CHANGE UP (ref 3.8–5.2)
RBC # BLD: 4.27 M/UL
RBC # BLD: 4.49 M/UL — SIGNIFICANT CHANGE UP (ref 3.8–5.2)
RBC # FLD: 13.2 % — SIGNIFICANT CHANGE UP (ref 10.3–14.5)
RBC # FLD: 13.3 % — SIGNIFICANT CHANGE UP (ref 10.3–14.5)
RBC # FLD: 13.4 % — SIGNIFICANT CHANGE UP (ref 10.3–14.5)
RBC # FLD: 13.5 % — SIGNIFICANT CHANGE UP (ref 10.3–14.5)
RBC # FLD: 13.6 % — SIGNIFICANT CHANGE UP (ref 10.3–14.5)
RBC # FLD: 13.7 %
RBC # FLD: 13.8 %
RBC # FLD: 13.9 % — SIGNIFICANT CHANGE UP (ref 10.3–14.5)
RBC # FLD: 14 % — SIGNIFICANT CHANGE UP (ref 10.3–14.5)
RBC # FLD: 14.6 % — HIGH (ref 10.3–14.5)
RBC # FLD: 14.7 % — HIGH (ref 10.3–14.5)
RBC BLD AUTO: NORMAL — SIGNIFICANT CHANGE UP
RBC CASTS # UR COMP ASSIST: NEGATIVE /HPF — SIGNIFICANT CHANGE UP (ref 0–4)
RBC CASTS # UR COMP ASSIST: SIGNIFICANT CHANGE UP /HPF (ref 0–4)
SAO2 % BLDV: 99 % — SIGNIFICANT CHANGE UP
SARS-COV-2 IGG SERPL QL IA: NEGATIVE — SIGNIFICANT CHANGE UP
SARS-COV-2 IGG SERPL QL IA: NEGATIVE — SIGNIFICANT CHANGE UP
SARS-COV-2 IGM SERPL IA-ACNC: 0.09 INDEX — SIGNIFICANT CHANGE UP
SARS-COV-2 IGM SERPL IA-ACNC: <3.8 AU/ML — SIGNIFICANT CHANGE UP
SARS-COV-2 RNA SPEC QL NAA+PROBE: SIGNIFICANT CHANGE UP
SODIUM SERPL-SCNC: 132 MMOL/L — LOW (ref 135–145)
SODIUM SERPL-SCNC: 133 MMOL/L — LOW (ref 135–145)
SODIUM SERPL-SCNC: 136 MMOL/L — SIGNIFICANT CHANGE UP (ref 135–145)
SODIUM SERPL-SCNC: 137 MMOL/L
SODIUM SERPL-SCNC: 137 MMOL/L — SIGNIFICANT CHANGE UP (ref 135–145)
SODIUM SERPL-SCNC: 138 MMOL/L — SIGNIFICANT CHANGE UP (ref 135–145)
SODIUM SERPL-SCNC: 139 MMOL/L — SIGNIFICANT CHANGE UP (ref 135–145)
SODIUM SERPL-SCNC: 140 MMOL/L — SIGNIFICANT CHANGE UP (ref 135–145)
SODIUM SERPL-SCNC: 142 MMOL/L
SODIUM SERPL-SCNC: 142 MMOL/L — SIGNIFICANT CHANGE UP (ref 135–145)
SODIUM SERPL-SCNC: 143 MMOL/L — SIGNIFICANT CHANGE UP (ref 135–145)
SP GR SPEC: 1.01 — SIGNIFICANT CHANGE UP (ref 1.01–1.02)
SP GR SPEC: 1.02 — SIGNIFICANT CHANGE UP (ref 1.01–1.02)
SPECIFIC GRAVITY URINE: 1.02
SPECIMEN SOURCE: SIGNIFICANT CHANGE UP
T3 SERPL-MCNC: 66 NG/DL — LOW (ref 80–200)
T3FREE SERPL-MCNC: 2.59 PG/ML
T4 AB SER-ACNC: 7.1 UG/DL — SIGNIFICANT CHANGE UP (ref 4.5–12)
T4 FREE SERPL-MCNC: 1.5 NG/DL
THC UR QL: NEGATIVE
THC UR QL: NEGATIVE — SIGNIFICANT CHANGE UP
TIBC SERPL-MCNC: 259 UG/DL
TIBC SERPL-MCNC: 293 UG/DL — SIGNIFICANT CHANGE UP (ref 220–430)
TOXASSURE SELECT RESULT: SIGNIFICANT CHANGE UP
TRANSFERRIN SERPL-MCNC: 205 MG/DL — SIGNIFICANT CHANGE UP (ref 192–382)
TROPONIN T SERPL-MCNC: 0.05 NG/ML — SIGNIFICANT CHANGE UP (ref 0–0.06)
TROPONIN T SERPL-MCNC: 0.07 NG/ML — HIGH (ref 0–0.06)
TROPONIN T SERPL-MCNC: 0.17 NG/ML — HIGH (ref 0–0.06)
TROPONIN T SERPL-MCNC: <0.01 NG/ML — SIGNIFICANT CHANGE UP (ref 0–0.06)
TSH SERPL-ACNC: 0.07 UIU/ML
TSH SERPL-ACNC: 1.64 UIU/ML
TSH SERPL-ACNC: 2.04 UIU/ML
TSH SERPL-MCNC: 0.45 UIU/ML — SIGNIFICANT CHANGE UP (ref 0.27–4.2)
UIBC SERPL-MCNC: 242 UG/DL
UROBILINOGEN FLD QL: NEGATIVE MG/DL — SIGNIFICANT CHANGE UP
UROBILINOGEN FLD QL: NEGATIVE MG/DL — SIGNIFICANT CHANGE UP
UROBILINOGEN URINE: NORMAL
VARIANT LYMPHS # BLD: 0.9 % — SIGNIFICANT CHANGE UP (ref 0–6)
VIT B12 SERPL-MCNC: 641 PG/ML — SIGNIFICANT CHANGE UP (ref 232–1245)
VIT B12 SERPL-MCNC: 665 PG/ML — SIGNIFICANT CHANGE UP (ref 232–1245)
VIT B12 SERPL-MCNC: 693 PG/ML
VIT B12 SERPL-MCNC: 737 PG/ML
WBC # BLD: 10.47 K/UL — SIGNIFICANT CHANGE UP (ref 3.8–10.5)
WBC # BLD: 11.15 K/UL — HIGH (ref 3.8–10.5)
WBC # BLD: 12.36 K/UL — HIGH (ref 3.8–10.5)
WBC # BLD: 13.92 K/UL — HIGH (ref 3.8–10.5)
WBC # BLD: 16.88 K/UL — HIGH (ref 3.8–10.5)
WBC # BLD: 19.4 K/UL — HIGH (ref 3.8–10.5)
WBC # BLD: 7.1 K/UL — SIGNIFICANT CHANGE UP (ref 3.8–10.5)
WBC # BLD: 7.26 K/UL — SIGNIFICANT CHANGE UP (ref 3.8–10.5)
WBC # BLD: 7.96 K/UL — SIGNIFICANT CHANGE UP (ref 3.8–10.5)
WBC # FLD AUTO: 10.47 K/UL — SIGNIFICANT CHANGE UP (ref 3.8–10.5)
WBC # FLD AUTO: 11.15 K/UL — HIGH (ref 3.8–10.5)
WBC # FLD AUTO: 12.36 K/UL — HIGH (ref 3.8–10.5)
WBC # FLD AUTO: 13.92 K/UL — HIGH (ref 3.8–10.5)
WBC # FLD AUTO: 14.05 K/UL
WBC # FLD AUTO: 16.88 K/UL — HIGH (ref 3.8–10.5)
WBC # FLD AUTO: 19.4 K/UL — HIGH (ref 3.8–10.5)
WBC # FLD AUTO: 5.75 K/UL
WBC # FLD AUTO: 7.1 K/UL — SIGNIFICANT CHANGE UP (ref 3.8–10.5)
WBC # FLD AUTO: 7.26 K/UL — SIGNIFICANT CHANGE UP (ref 3.8–10.5)
WBC # FLD AUTO: 7.96 K/UL — SIGNIFICANT CHANGE UP (ref 3.8–10.5)
WBC UR QL: SIGNIFICANT CHANGE UP
WBC UR QL: SIGNIFICANT CHANGE UP

## 2020-01-01 PROCEDURE — 99232 SBSQ HOSP IP/OBS MODERATE 35: CPT

## 2020-01-01 PROCEDURE — 74177 CT ABD & PELVIS W/CONTRAST: CPT | Mod: 26

## 2020-01-01 PROCEDURE — 84484 ASSAY OF TROPONIN QUANT: CPT

## 2020-01-01 PROCEDURE — 36415 COLL VENOUS BLD VENIPUNCTURE: CPT

## 2020-01-01 PROCEDURE — 74177 CT ABD & PELVIS W/CONTRAST: CPT

## 2020-01-01 PROCEDURE — 97530 THERAPEUTIC ACTIVITIES: CPT

## 2020-01-01 PROCEDURE — 84436 ASSAY OF TOTAL THYROXINE: CPT

## 2020-01-01 PROCEDURE — 95720 EEG PHY/QHP EA INCR W/VEEG: CPT

## 2020-01-01 PROCEDURE — 84443 ASSAY THYROID STIM HORMONE: CPT

## 2020-01-01 PROCEDURE — 80307 DRUG TEST PRSMV CHEM ANLYZR: CPT

## 2020-01-01 PROCEDURE — 70450 CT HEAD/BRAIN W/O DYE: CPT | Mod: 26

## 2020-01-01 PROCEDURE — 83735 ASSAY OF MAGNESIUM: CPT

## 2020-01-01 PROCEDURE — 82306 VITAMIN D 25 HYDROXY: CPT

## 2020-01-01 PROCEDURE — 95714 VEEG EA 12-26 HR UNMNTR: CPT

## 2020-01-01 PROCEDURE — 99239 HOSP IP/OBS DSCHRG MGMT >30: CPT

## 2020-01-01 PROCEDURE — 84466 ASSAY OF TRANSFERRIN: CPT

## 2020-01-01 PROCEDURE — 84132 ASSAY OF SERUM POTASSIUM: CPT

## 2020-01-01 PROCEDURE — 99285 EMERGENCY DEPT VISIT HI MDM: CPT

## 2020-01-01 PROCEDURE — 71045 X-RAY EXAM CHEST 1 VIEW: CPT | Mod: 26

## 2020-01-01 PROCEDURE — G0297: CPT | Mod: 26

## 2020-01-01 PROCEDURE — 71045 X-RAY EXAM CHEST 1 VIEW: CPT

## 2020-01-01 PROCEDURE — 72128 CT CHEST SPINE W/O DYE: CPT

## 2020-01-01 PROCEDURE — 82962 GLUCOSE BLOOD TEST: CPT

## 2020-01-01 PROCEDURE — 85014 HEMATOCRIT: CPT

## 2020-01-01 PROCEDURE — 96374 THER/PROPH/DIAG INJ IV PUSH: CPT

## 2020-01-01 PROCEDURE — 99285 EMERGENCY DEPT VISIT HI MDM: CPT | Mod: 25

## 2020-01-01 PROCEDURE — 96376 TX/PRO/DX INJ SAME DRUG ADON: CPT

## 2020-01-01 PROCEDURE — 86901 BLOOD TYPING SEROLOGIC RH(D): CPT

## 2020-01-01 PROCEDURE — 99233 SBSQ HOSP IP/OBS HIGH 50: CPT

## 2020-01-01 PROCEDURE — 85730 THROMBOPLASTIN TIME PARTIAL: CPT

## 2020-01-01 PROCEDURE — 93005 ELECTROCARDIOGRAM TRACING: CPT

## 2020-01-01 PROCEDURE — 72100 X-RAY EXAM L-S SPINE 2/3 VWS: CPT

## 2020-01-01 PROCEDURE — 95705 EEG W/O VID 2-12 HR UNMNTR: CPT

## 2020-01-01 PROCEDURE — 93010 ELECTROCARDIOGRAM REPORT: CPT

## 2020-01-01 PROCEDURE — 71046 X-RAY EXAM CHEST 2 VIEWS: CPT

## 2020-01-01 PROCEDURE — 82607 VITAMIN B-12: CPT

## 2020-01-01 PROCEDURE — 86769 SARS-COV-2 COVID-19 ANTIBODY: CPT

## 2020-01-01 PROCEDURE — 82565 ASSAY OF CREATININE: CPT

## 2020-01-01 PROCEDURE — 78452 HT MUSCLE IMAGE SPECT MULT: CPT

## 2020-01-01 PROCEDURE — 99284 EMERGENCY DEPT VISIT MOD MDM: CPT | Mod: 25

## 2020-01-01 PROCEDURE — 94010 BREATHING CAPACITY TEST: CPT

## 2020-01-01 PROCEDURE — 71260 CT THORAX DX C+: CPT | Mod: 26

## 2020-01-01 PROCEDURE — 70460 CT HEAD/BRAIN W/DYE: CPT | Mod: 26

## 2020-01-01 PROCEDURE — 33274 TCAT INSJ/RPL PERM LDLS PM: CPT

## 2020-01-01 PROCEDURE — 80053 COMPREHEN METABOLIC PANEL: CPT

## 2020-01-01 PROCEDURE — 82330 ASSAY OF CALCIUM: CPT

## 2020-01-01 PROCEDURE — C1894: CPT

## 2020-01-01 PROCEDURE — U0003: CPT

## 2020-01-01 PROCEDURE — 99214 OFFICE O/P EST MOD 30 MIN: CPT

## 2020-01-01 PROCEDURE — 85018 HEMOGLOBIN: CPT

## 2020-01-01 PROCEDURE — 81001 URINALYSIS AUTO W/SCOPE: CPT

## 2020-01-01 PROCEDURE — 85025 COMPLETE CBC W/AUTO DIFF WBC: CPT

## 2020-01-01 PROCEDURE — 72131 CT LUMBAR SPINE W/O DYE: CPT | Mod: 26

## 2020-01-01 PROCEDURE — 82140 ASSAY OF AMMONIA: CPT

## 2020-01-01 PROCEDURE — 83550 IRON BINDING TEST: CPT

## 2020-01-01 PROCEDURE — 99214 OFFICE O/P EST MOD 30 MIN: CPT | Mod: 95

## 2020-01-01 PROCEDURE — 82728 ASSAY OF FERRITIN: CPT

## 2020-01-01 PROCEDURE — 72125 CT NECK SPINE W/O DYE: CPT

## 2020-01-01 PROCEDURE — 71046 X-RAY EXAM CHEST 2 VIEWS: CPT | Mod: 26

## 2020-01-01 PROCEDURE — 85027 COMPLETE CBC AUTOMATED: CPT

## 2020-01-01 PROCEDURE — 86803 HEPATITIS C AB TEST: CPT

## 2020-01-01 PROCEDURE — 95819 EEG AWAKE AND ASLEEP: CPT

## 2020-01-01 PROCEDURE — 99213 OFFICE O/P EST LOW 20 MIN: CPT | Mod: 95

## 2020-01-01 PROCEDURE — 95819 EEG AWAKE AND ASLEEP: CPT | Mod: 26

## 2020-01-01 PROCEDURE — 78452 HT MUSCLE IMAGE SPECT MULT: CPT | Mod: 26

## 2020-01-01 PROCEDURE — 72131 CT LUMBAR SPINE W/O DYE: CPT

## 2020-01-01 PROCEDURE — 82550 ASSAY OF CK (CPK): CPT

## 2020-01-01 PROCEDURE — 82746 ASSAY OF FOLIC ACID SERUM: CPT

## 2020-01-01 PROCEDURE — 99223 1ST HOSP IP/OBS HIGH 75: CPT

## 2020-01-01 PROCEDURE — G0296 VISIT TO DETERM LDCT ELIG: CPT | Mod: 95

## 2020-01-01 PROCEDURE — 96375 TX/PRO/DX INJ NEW DRUG ADDON: CPT

## 2020-01-01 PROCEDURE — 99212 OFFICE O/P EST SF 10 MIN: CPT | Mod: 95

## 2020-01-01 PROCEDURE — 97163 PT EVAL HIGH COMPLEX 45 MIN: CPT

## 2020-01-01 PROCEDURE — 70470 CT HEAD/BRAIN W/O & W/DYE: CPT | Mod: 26

## 2020-01-01 PROCEDURE — 70450 CT HEAD/BRAIN W/O DYE: CPT

## 2020-01-01 PROCEDURE — 93306 TTE W/DOPPLER COMPLETE: CPT | Mod: 26

## 2020-01-01 PROCEDURE — 99495 TRANSJ CARE MGMT MOD F2F 14D: CPT | Mod: 25

## 2020-01-01 PROCEDURE — 83540 ASSAY OF IRON: CPT

## 2020-01-01 PROCEDURE — 86923 COMPATIBILITY TEST ELECTRIC: CPT

## 2020-01-01 PROCEDURE — 93458 L HRT ARTERY/VENTRICLE ANGIO: CPT

## 2020-01-01 PROCEDURE — 82947 ASSAY GLUCOSE BLOOD QUANT: CPT

## 2020-01-01 PROCEDURE — C1887: CPT

## 2020-01-01 PROCEDURE — C8929: CPT

## 2020-01-01 PROCEDURE — 86850 RBC ANTIBODY SCREEN: CPT

## 2020-01-01 PROCEDURE — 87040 BLOOD CULTURE FOR BACTERIA: CPT

## 2020-01-01 PROCEDURE — 99214 OFFICE O/P EST MOD 30 MIN: CPT | Mod: 25

## 2020-01-01 PROCEDURE — G0480: CPT

## 2020-01-01 PROCEDURE — C1769: CPT

## 2020-01-01 PROCEDURE — 70450 CT HEAD/BRAIN W/O DYE: CPT | Mod: 26,77

## 2020-01-01 PROCEDURE — 71260 CT THORAX DX C+: CPT

## 2020-01-01 PROCEDURE — 82803 BLOOD GASES ANY COMBINATION: CPT

## 2020-01-01 PROCEDURE — 72100 X-RAY EXAM L-S SPINE 2/3 VWS: CPT | Mod: 26

## 2020-01-01 PROCEDURE — 99152 MOD SED SAME PHYS/QHP 5/>YRS: CPT

## 2020-01-01 PROCEDURE — 80048 BASIC METABOLIC PNL TOTAL CA: CPT

## 2020-01-01 PROCEDURE — 87086 URINE CULTURE/COLONY COUNT: CPT

## 2020-01-01 PROCEDURE — G0297: CPT

## 2020-01-01 PROCEDURE — 92610 EVALUATE SWALLOWING FUNCTION: CPT

## 2020-01-01 PROCEDURE — 93018 CV STRESS TEST I&R ONLY: CPT

## 2020-01-01 PROCEDURE — 72170 X-RAY EXAM OF PELVIS: CPT | Mod: 26

## 2020-01-01 PROCEDURE — 70460 CT HEAD/BRAIN W/DYE: CPT

## 2020-01-01 PROCEDURE — 72128 CT CHEST SPINE W/O DYE: CPT | Mod: 26

## 2020-01-01 PROCEDURE — 83605 ASSAY OF LACTIC ACID: CPT

## 2020-01-01 PROCEDURE — C1786: CPT

## 2020-01-01 PROCEDURE — 85610 PROTHROMBIN TIME: CPT

## 2020-01-01 PROCEDURE — 84480 ASSAY TRIIODOTHYRONINE (T3): CPT

## 2020-01-01 PROCEDURE — 80076 HEPATIC FUNCTION PANEL: CPT

## 2020-01-01 PROCEDURE — 72170 X-RAY EXAM OF PELVIS: CPT

## 2020-01-01 PROCEDURE — 70470 CT HEAD/BRAIN W/O & W/DYE: CPT

## 2020-01-01 PROCEDURE — 86900 BLOOD TYPING SEROLOGIC ABO: CPT

## 2020-01-01 PROCEDURE — 33274 TCAT INSJ/RPL PERM LDLS PM: CPT | Mod: Q0

## 2020-01-01 PROCEDURE — 97110 THERAPEUTIC EXERCISES: CPT

## 2020-01-01 PROCEDURE — 97116 GAIT TRAINING THERAPY: CPT

## 2020-01-01 PROCEDURE — 82435 ASSAY OF BLOOD CHLORIDE: CPT

## 2020-01-01 PROCEDURE — A9500: CPT

## 2020-01-01 PROCEDURE — 84145 PROCALCITONIN (PCT): CPT

## 2020-01-01 PROCEDURE — 84100 ASSAY OF PHOSPHORUS: CPT

## 2020-01-01 PROCEDURE — 87635 SARS-COV-2 COVID-19 AMP PRB: CPT

## 2020-01-01 PROCEDURE — 72125 CT NECK SPINE W/O DYE: CPT | Mod: 26

## 2020-01-01 PROCEDURE — 99213 OFFICE O/P EST LOW 20 MIN: CPT

## 2020-01-01 PROCEDURE — 84295 ASSAY OF SERUM SODIUM: CPT

## 2020-01-01 PROCEDURE — 95718 EEG PHYS/QHP 2-12 HR W/VEEG: CPT

## 2020-01-01 PROCEDURE — 93016 CV STRESS TEST SUPVJ ONLY: CPT

## 2020-01-01 PROCEDURE — 93017 CV STRESS TEST TRACING ONLY: CPT

## 2020-01-01 PROCEDURE — 95711 VEEG 2-12 HR UNMONITORED: CPT

## 2020-01-01 PROCEDURE — 83690 ASSAY OF LIPASE: CPT

## 2020-01-01 RX ORDER — POLYETHYLENE GLYCOL 3350 17 G/17G
17 POWDER, FOR SOLUTION ORAL
Qty: 1020 | Refills: 0
Start: 2020-01-01 | End: 2020-01-01

## 2020-01-01 RX ORDER — LEVOCETIRIZINE DIHYDROCHLORIDE 5 MG/1
5 TABLET ORAL DAILY
Qty: 30 | Refills: 1 | Status: DISCONTINUED | COMMUNITY
Start: 2020-01-01 | End: 2020-01-01

## 2020-01-01 RX ORDER — ACYCLOVIR SODIUM 500 MG
VIAL (EA) INTRAVENOUS
Refills: 0 | Status: DISCONTINUED | OUTPATIENT
Start: 2020-01-01 | End: 2020-01-01

## 2020-01-01 RX ORDER — AMPICILLIN TRIHYDRATE 250 MG
2 CAPSULE ORAL ONCE
Refills: 0 | Status: COMPLETED | OUTPATIENT
Start: 2020-01-01 | End: 2020-01-01

## 2020-01-01 RX ORDER — ASPIRIN/CALCIUM CARB/MAGNESIUM 324 MG
1 TABLET ORAL
Qty: 0 | Refills: 0 | DISCHARGE

## 2020-01-01 RX ORDER — APIXABAN 2.5 MG/1
1 TABLET, FILM COATED ORAL
Qty: 0 | Refills: 0 | DISCHARGE
Start: 2020-01-01

## 2020-01-01 RX ORDER — LEVETIRACETAM 250 MG/1
1000 TABLET, FILM COATED ORAL ONCE
Refills: 0 | Status: COMPLETED | OUTPATIENT
Start: 2020-01-01 | End: 2020-01-01

## 2020-01-01 RX ORDER — FLECAINIDE ACETATE 50 MG
50 TABLET ORAL
Refills: 0 | Status: DISCONTINUED | OUTPATIENT
Start: 2020-01-01 | End: 2020-01-01

## 2020-01-01 RX ORDER — MAGNESIUM SULFATE 500 MG/ML
2 VIAL (ML) INJECTION ONCE
Refills: 0 | Status: COMPLETED | OUTPATIENT
Start: 2020-01-01 | End: 2020-01-01

## 2020-01-01 RX ORDER — APIXABAN 2.5 MG/1
5 TABLET, FILM COATED ORAL
Refills: 0 | Status: DISCONTINUED | OUTPATIENT
Start: 2020-01-01 | End: 2020-01-01

## 2020-01-01 RX ORDER — ESCITALOPRAM OXALATE 10 MG/1
10 TABLET, FILM COATED ORAL DAILY
Refills: 0 | Status: DISCONTINUED | OUTPATIENT
Start: 2020-01-01 | End: 2020-01-01

## 2020-01-01 RX ORDER — PANTOPRAZOLE SODIUM 20 MG/1
40 TABLET, DELAYED RELEASE ORAL
Refills: 0 | Status: DISCONTINUED | OUTPATIENT
Start: 2020-01-01 | End: 2020-01-01

## 2020-01-01 RX ORDER — FLUTICASONE PROPIONATE 50 UG/1
50 SPRAY, METERED NASAL TWICE DAILY
Qty: 48 | Refills: 1 | Status: DISCONTINUED | COMMUNITY
Start: 2019-08-15 | End: 2020-01-01

## 2020-01-01 RX ORDER — ESCITALOPRAM OXALATE 10 MG/1
10 TABLET ORAL DAILY
Qty: 90 | Refills: 1 | Status: ACTIVE | COMMUNITY
Start: 1900-01-01 | End: 1900-01-01

## 2020-01-01 RX ORDER — LIDOCAINE 4 G/100G
1 CREAM TOPICAL
Qty: 0 | Refills: 0 | DISCHARGE
Start: 2020-01-01

## 2020-01-01 RX ORDER — TAMSULOSIN HYDROCHLORIDE 0.4 MG/1
1 CAPSULE ORAL
Qty: 30 | Refills: 0
Start: 2020-01-01 | End: 2020-01-01

## 2020-01-01 RX ORDER — METOPROLOL TARTRATE 50 MG
25 TABLET ORAL DAILY
Refills: 0 | Status: DISCONTINUED | OUTPATIENT
Start: 2020-01-01 | End: 2020-01-01

## 2020-01-01 RX ORDER — OXYCODONE HYDROCHLORIDE 5 MG/1
5 TABLET ORAL EVERY 6 HOURS
Refills: 0 | Status: DISCONTINUED | OUTPATIENT
Start: 2020-01-01 | End: 2020-01-01

## 2020-01-01 RX ORDER — POTASSIUM CHLORIDE 20 MEQ
20 PACKET (EA) ORAL ONCE
Refills: 0 | Status: COMPLETED | OUTPATIENT
Start: 2020-01-01 | End: 2020-01-01

## 2020-01-01 RX ORDER — GABAPENTIN 300 MG/1
300 CAPSULE ORAL
Qty: 270 | Refills: 2 | Status: DISCONTINUED | COMMUNITY
Start: 2020-01-01 | End: 2020-01-01

## 2020-01-01 RX ORDER — METOPROLOL TARTRATE 50 MG
1 TABLET ORAL
Qty: 0 | Refills: 0 | DISCHARGE

## 2020-01-01 RX ORDER — VANCOMYCIN HCL 1 G
1000 VIAL (EA) INTRAVENOUS ONCE
Refills: 0 | Status: COMPLETED | OUTPATIENT
Start: 2020-01-01 | End: 2020-01-01

## 2020-01-01 RX ORDER — PROMETHAZINE HYDROCHLORIDE AND DEXTROMETHORPHAN HYDROBROMIDE ORAL SOLUTION 15; 6.25 MG/5ML; MG/5ML
6.25-15 SOLUTION ORAL
Qty: 150 | Refills: 0 | Status: DISCONTINUED | COMMUNITY
Start: 2020-01-01 | End: 2020-01-01

## 2020-01-01 RX ORDER — LACOSAMIDE 50 MG/1
100 TABLET ORAL ONCE
Refills: 0 | Status: DISCONTINUED | OUTPATIENT
Start: 2020-01-01 | End: 2020-01-01

## 2020-01-01 RX ORDER — LEVETIRACETAM 1000 MG/1
1000 TABLET, FILM COATED ORAL TWICE DAILY
Qty: 180 | Refills: 0 | Status: ACTIVE | COMMUNITY
Start: 2020-01-01

## 2020-01-01 RX ORDER — TAMSULOSIN HYDROCHLORIDE 0.4 MG/1
1 CAPSULE ORAL
Qty: 0 | Refills: 0 | DISCHARGE
Start: 2020-01-01

## 2020-01-01 RX ORDER — SODIUM CHLORIDE 9 MG/ML
1000 INJECTION, SOLUTION INTRAVENOUS ONCE
Refills: 0 | Status: COMPLETED | OUTPATIENT
Start: 2020-01-01 | End: 2020-01-01

## 2020-01-01 RX ORDER — ACYCLOVIR SODIUM 500 MG
500 VIAL (EA) INTRAVENOUS EVERY 8 HOURS
Refills: 0 | Status: DISCONTINUED | OUTPATIENT
Start: 2020-01-01 | End: 2020-01-01

## 2020-01-01 RX ORDER — HYDROMORPHONE HYDROCHLORIDE 2 MG/ML
1 INJECTION INTRAMUSCULAR; INTRAVENOUS; SUBCUTANEOUS ONCE
Refills: 0 | Status: DISCONTINUED | OUTPATIENT
Start: 2020-01-01 | End: 2020-01-01

## 2020-01-01 RX ORDER — OMEPRAZOLE 10 MG/1
1 CAPSULE, DELAYED RELEASE ORAL
Qty: 0 | Refills: 0 | DISCHARGE

## 2020-01-01 RX ORDER — LEVETIRACETAM 250 MG/1
1000 TABLET, FILM COATED ORAL
Refills: 0 | Status: DISCONTINUED | OUTPATIENT
Start: 2020-01-01 | End: 2020-01-01

## 2020-01-01 RX ORDER — ATORVASTATIN CALCIUM 80 MG/1
1 TABLET, FILM COATED ORAL
Qty: 30 | Refills: 0
Start: 2020-01-01 | End: 2020-01-01

## 2020-01-01 RX ORDER — ASPIRIN/CALCIUM CARB/MAGNESIUM 324 MG
1 TABLET ORAL
Qty: 30 | Refills: 0
Start: 2020-01-01 | End: 2020-01-01

## 2020-01-01 RX ORDER — IBUPROFEN 200 MG
1 TABLET ORAL
Qty: 0 | Refills: 0 | DISCHARGE
Start: 2020-01-01

## 2020-01-01 RX ORDER — ACETAMINOPHEN 500 MG
650 TABLET ORAL ONCE
Refills: 0 | Status: COMPLETED | OUTPATIENT
Start: 2020-01-01 | End: 2020-01-01

## 2020-01-01 RX ORDER — LEVOTHYROXINE SODIUM 125 MCG
56 TABLET ORAL AT BEDTIME
Refills: 0 | Status: DISCONTINUED | OUTPATIENT
Start: 2020-01-01 | End: 2020-01-01

## 2020-01-01 RX ORDER — ACETAMINOPHEN 500 MG
650 TABLET ORAL EVERY 6 HOURS
Refills: 0 | Status: DISCONTINUED | OUTPATIENT
Start: 2020-01-01 | End: 2020-01-01

## 2020-01-01 RX ORDER — ACETAMINOPHEN 500 MG
975 TABLET ORAL EVERY 8 HOURS
Refills: 0 | Status: COMPLETED | OUTPATIENT
Start: 2020-01-01 | End: 2020-01-01

## 2020-01-01 RX ORDER — LEVETIRACETAM 250 MG/1
500 TABLET, FILM COATED ORAL ONCE
Refills: 0 | Status: COMPLETED | OUTPATIENT
Start: 2020-01-01 | End: 2020-01-01

## 2020-01-01 RX ORDER — DEXAMETHASONE 0.5 MG/5ML
10 ELIXIR ORAL ONCE
Refills: 0 | Status: COMPLETED | OUTPATIENT
Start: 2020-01-01 | End: 2020-01-01

## 2020-01-01 RX ORDER — ATORVASTATIN CALCIUM 80 MG/1
1 TABLET, FILM COATED ORAL
Qty: 0 | Refills: 0 | DISCHARGE

## 2020-01-01 RX ORDER — VANCOMYCIN HCL 1 G
750 VIAL (EA) INTRAVENOUS
Refills: 0 | Status: DISCONTINUED | OUTPATIENT
Start: 2020-01-01 | End: 2020-01-01

## 2020-01-01 RX ORDER — SUCRALFATE 1 G
1 TABLET ORAL
Qty: 60 | Refills: 0
Start: 2020-01-01 | End: 2020-01-01

## 2020-01-01 RX ORDER — CEFTRIAXONE 500 MG/1
2000 INJECTION, POWDER, FOR SOLUTION INTRAMUSCULAR; INTRAVENOUS ONCE
Refills: 0 | Status: COMPLETED | OUTPATIENT
Start: 2020-01-01 | End: 2020-01-01

## 2020-01-01 RX ORDER — LEVETIRACETAM 250 MG/1
1 TABLET, FILM COATED ORAL
Qty: 0 | Refills: 0 | DISCHARGE
Start: 2020-01-01

## 2020-01-01 RX ORDER — METOPROLOL TARTRATE 50 MG
1 TABLET ORAL
Qty: 30 | Refills: 0
Start: 2020-01-01 | End: 2020-01-01

## 2020-01-01 RX ORDER — OXYCODONE HYDROCHLORIDE 5 MG/1
1 TABLET ORAL
Qty: 0 | Refills: 0 | DISCHARGE
Start: 2020-01-01

## 2020-01-01 RX ORDER — OXYCODONE AND ACETAMINOPHEN 5; 325 MG/1; MG/1
5-325 TABLET ORAL
Qty: 60 | Refills: 0 | Status: ACTIVE | COMMUNITY
Start: 2020-01-01 | End: 1900-01-01

## 2020-01-01 RX ORDER — ACYCLOVIR SODIUM 500 MG
650 VIAL (EA) INTRAVENOUS ONCE
Refills: 0 | Status: COMPLETED | OUTPATIENT
Start: 2020-01-01 | End: 2020-01-01

## 2020-01-01 RX ORDER — LACOSAMIDE 50 MG/1
100 TABLET ORAL
Refills: 0 | Status: DISCONTINUED | OUTPATIENT
Start: 2020-01-01 | End: 2020-01-01

## 2020-01-01 RX ORDER — KETOROLAC TROMETHAMINE 30 MG/ML
15 SYRINGE (ML) INJECTION ONCE
Refills: 0 | Status: DISCONTINUED | OUTPATIENT
Start: 2020-01-01 | End: 2020-01-01

## 2020-01-01 RX ORDER — LANOLIN ALCOHOL/MO/W.PET/CERES
3 CREAM (GRAM) TOPICAL AT BEDTIME
Refills: 0 | Status: DISCONTINUED | OUTPATIENT
Start: 2020-01-01 | End: 2020-01-01

## 2020-01-01 RX ORDER — OXYCODONE AND ACETAMINOPHEN 5; 325 MG/1; MG/1
1 TABLET ORAL ONCE
Refills: 0 | Status: DISCONTINUED | OUTPATIENT
Start: 2020-01-01 | End: 2020-01-01

## 2020-01-01 RX ORDER — CHLORHEXIDINE GLUCONATE, 0.12% ORAL RINSE 1.2 MG/ML
0.12 SOLUTION DENTAL
Qty: 1 | Refills: 3 | Status: DISCONTINUED | COMMUNITY
Start: 2019-01-01 | End: 2020-01-01

## 2020-01-01 RX ORDER — LIDOCAINE 4 G/100G
1 CREAM TOPICAL EVERY 24 HOURS
Refills: 0 | Status: DISCONTINUED | OUTPATIENT
Start: 2020-01-01 | End: 2020-01-01

## 2020-01-01 RX ORDER — LEVETIRACETAM 250 MG/1
1500 TABLET, FILM COATED ORAL
Refills: 0 | Status: DISCONTINUED | OUTPATIENT
Start: 2020-01-01 | End: 2020-01-01

## 2020-01-01 RX ORDER — LEVOTHYROXINE SODIUM 125 MCG
112 TABLET ORAL DAILY
Refills: 0 | Status: DISCONTINUED | OUTPATIENT
Start: 2020-01-01 | End: 2020-01-01

## 2020-01-01 RX ORDER — APIXABAN 2.5 MG/1
1 TABLET, FILM COATED ORAL
Qty: 60 | Refills: 0
Start: 2020-01-01 | End: 2020-01-01

## 2020-01-01 RX ORDER — POLYETHYLENE GLYCOL 3350 17 G/17G
17 POWDER, FOR SOLUTION ORAL
Qty: 0 | Refills: 0 | DISCHARGE
Start: 2020-01-01

## 2020-01-01 RX ORDER — ASPIRIN/CALCIUM CARB/MAGNESIUM 324 MG
81 TABLET ORAL DAILY
Refills: 0 | Status: DISCONTINUED | OUTPATIENT
Start: 2020-01-01 | End: 2020-01-01

## 2020-01-01 RX ORDER — METOPROLOL TARTRATE 50 MG
25 TABLET ORAL
Refills: 0 | Status: DISCONTINUED | OUTPATIENT
Start: 2020-01-01 | End: 2020-01-01

## 2020-01-01 RX ORDER — PANTOPRAZOLE SODIUM 20 MG/1
40 TABLET, DELAYED RELEASE ORAL ONCE
Refills: 0 | Status: COMPLETED | OUTPATIENT
Start: 2020-01-01 | End: 2020-01-01

## 2020-01-01 RX ORDER — IBUPROFEN 200 MG
400 TABLET ORAL EVERY 8 HOURS
Refills: 0 | Status: DISCONTINUED | OUTPATIENT
Start: 2020-01-01 | End: 2020-01-01

## 2020-01-01 RX ORDER — GABAPENTIN 300 MG/1
300 CAPSULE ORAL 4 TIMES DAILY
Qty: 90 | Refills: 3 | Status: DISCONTINUED | COMMUNITY
Start: 2020-01-01 | End: 2020-01-01

## 2020-01-01 RX ORDER — METOPROLOL TARTRATE 50 MG
0 TABLET ORAL
Qty: 0 | Refills: 0 | DISCHARGE

## 2020-01-01 RX ORDER — ALBUTEROL 90 UG/1
0 AEROSOL, METERED ORAL
Qty: 0 | Refills: 0 | DISCHARGE

## 2020-01-01 RX ORDER — LEVETIRACETAM 250 MG/1
1000 TABLET, FILM COATED ORAL EVERY 12 HOURS
Refills: 0 | Status: DISCONTINUED | OUTPATIENT
Start: 2020-01-01 | End: 2020-01-01

## 2020-01-01 RX ORDER — ATORVASTATIN CALCIUM 80 MG/1
1 TABLET, FILM COATED ORAL
Qty: 0 | Refills: 0 | DISCHARGE
Start: 2020-01-01

## 2020-01-01 RX ORDER — LEVOTHYROXINE SODIUM 125 MCG
1 TABLET ORAL
Qty: 0 | Refills: 0 | DISCHARGE

## 2020-01-01 RX ORDER — AMPICILLIN TRIHYDRATE 250 MG
2 CAPSULE ORAL EVERY 4 HOURS
Refills: 0 | Status: DISCONTINUED | OUTPATIENT
Start: 2020-01-01 | End: 2020-01-01

## 2020-01-01 RX ORDER — GABAPENTIN 400 MG/1
1 CAPSULE ORAL
Qty: 0 | Refills: 0 | DISCHARGE

## 2020-01-01 RX ORDER — SUCRALFATE 1 G
1 TABLET ORAL EVERY 12 HOURS
Refills: 0 | Status: DISCONTINUED | OUTPATIENT
Start: 2020-01-01 | End: 2020-01-01

## 2020-01-01 RX ORDER — SODIUM CHLORIDE 9 MG/ML
1000 INJECTION INTRAMUSCULAR; INTRAVENOUS; SUBCUTANEOUS
Refills: 0 | Status: DISCONTINUED | OUTPATIENT
Start: 2020-01-01 | End: 2020-01-01

## 2020-01-01 RX ORDER — PREGABALIN 50 MG/1
50 CAPSULE ORAL
Qty: 60 | Refills: 5 | Status: ACTIVE | COMMUNITY
Start: 2020-01-01 | End: 1900-01-01

## 2020-01-01 RX ORDER — LACOSAMIDE 100 MG/1
100 TABLET, FILM COATED ORAL
Qty: 60 | Refills: 0 | Status: DISCONTINUED | COMMUNITY
Start: 2020-01-01 | End: 2020-01-01

## 2020-01-01 RX ORDER — LEVETIRACETAM 250 MG/1
3 TABLET, FILM COATED ORAL
Qty: 100 | Refills: 0
Start: 2020-01-01

## 2020-01-01 RX ORDER — OXYCODONE HYDROCHLORIDE 5 MG/1
5 TABLET ORAL ONCE
Refills: 0 | Status: DISCONTINUED | OUTPATIENT
Start: 2020-01-01 | End: 2020-01-01

## 2020-01-01 RX ORDER — VANCOMYCIN HCL 1 G
1000 VIAL (EA) INTRAVENOUS EVERY 12 HOURS
Refills: 0 | Status: DISCONTINUED | OUTPATIENT
Start: 2020-01-01 | End: 2020-01-01

## 2020-01-01 RX ORDER — CEFTRIAXONE 500 MG/1
2000 INJECTION, POWDER, FOR SOLUTION INTRAMUSCULAR; INTRAVENOUS EVERY 12 HOURS
Refills: 0 | Status: DISCONTINUED | OUTPATIENT
Start: 2020-01-01 | End: 2020-01-01

## 2020-01-01 RX ORDER — ACYCLOVIR SODIUM 500 MG
650 VIAL (EA) INTRAVENOUS EVERY 8 HOURS
Refills: 0 | Status: DISCONTINUED | OUTPATIENT
Start: 2020-01-01 | End: 2020-01-01

## 2020-01-01 RX ORDER — LEVETIRACETAM 250 MG/1
500 TABLET, FILM COATED ORAL ONCE
Refills: 0 | Status: DISCONTINUED | OUTPATIENT
Start: 2020-01-01 | End: 2020-01-01

## 2020-01-01 RX ORDER — FLECAINIDE ACETATE 50 MG/1
50 TABLET ORAL
Qty: 60 | Refills: 3 | Status: DISCONTINUED | COMMUNITY
Start: 2019-06-20 | End: 2020-01-01

## 2020-01-01 RX ORDER — METOPROLOL TARTRATE 50 MG
12.5 TABLET ORAL
Refills: 0 | Status: DISCONTINUED | OUTPATIENT
Start: 2020-01-01 | End: 2020-01-01

## 2020-01-01 RX ORDER — LEVOTHYROXINE SODIUM 125 MCG
1 TABLET ORAL
Qty: 30 | Refills: 0
Start: 2020-01-01 | End: 2020-01-01

## 2020-01-01 RX ORDER — LEVETIRACETAM 250 MG/1
1 TABLET, FILM COATED ORAL
Qty: 60 | Refills: 0
Start: 2020-01-01 | End: 2020-01-01

## 2020-01-01 RX ORDER — CYCLOBENZAPRINE HYDROCHLORIDE 10 MG/1
5 TABLET, FILM COATED ORAL ONCE
Refills: 0 | Status: COMPLETED | OUTPATIENT
Start: 2020-01-01 | End: 2020-01-01

## 2020-01-01 RX ORDER — ASPIRIN/CALCIUM CARB/MAGNESIUM 324 MG
81 TABLET ORAL ONCE
Refills: 0 | Status: COMPLETED | OUTPATIENT
Start: 2020-01-01 | End: 2020-01-01

## 2020-01-01 RX ORDER — CEFTRIAXONE 500 MG/1
1000 INJECTION, POWDER, FOR SOLUTION INTRAMUSCULAR; INTRAVENOUS EVERY 24 HOURS
Refills: 0 | Status: DISCONTINUED | OUTPATIENT
Start: 2020-01-01 | End: 2020-01-01

## 2020-01-01 RX ORDER — OXYCODONE AND ACETAMINOPHEN 5; 325 MG/1; MG/1
1 TABLET ORAL EVERY 12 HOURS
Refills: 0 | Status: DISCONTINUED | OUTPATIENT
Start: 2020-01-01 | End: 2020-01-01

## 2020-01-01 RX ORDER — LIDOCAINE 4 G/100G
1 CREAM TOPICAL
Qty: 10 | Refills: 0
Start: 2020-01-01 | End: 2020-01-01

## 2020-01-01 RX ORDER — CEFTRIAXONE 500 MG/1
1000 INJECTION, POWDER, FOR SOLUTION INTRAMUSCULAR; INTRAVENOUS ONCE
Refills: 0 | Status: COMPLETED | OUTPATIENT
Start: 2020-01-01 | End: 2020-01-01

## 2020-01-01 RX ORDER — METOPROLOL TARTRATE 25 MG/1
25 TABLET, FILM COATED ORAL
Qty: 30 | Refills: 3 | Status: ACTIVE | COMMUNITY
Start: 2018-04-09

## 2020-01-01 RX ORDER — ESCITALOPRAM OXALATE 10 MG/1
1 TABLET, FILM COATED ORAL
Qty: 30 | Refills: 0
Start: 2020-01-01 | End: 2020-01-01

## 2020-01-01 RX ORDER — PERMETHRIN 50 MG/G
5 CREAM TOPICAL
Qty: 1 | Refills: 2 | Status: DISCONTINUED | COMMUNITY
Start: 2020-01-01 | End: 2020-01-01

## 2020-01-01 RX ORDER — CHLORHEXIDINE GLUCONATE 213 G/1000ML
1 SOLUTION TOPICAL
Refills: 0 | Status: DISCONTINUED | OUTPATIENT
Start: 2020-01-01 | End: 2020-01-01

## 2020-01-01 RX ORDER — TAMSULOSIN HYDROCHLORIDE 0.4 MG/1
0.4 CAPSULE ORAL AT BEDTIME
Refills: 0 | Status: DISCONTINUED | OUTPATIENT
Start: 2020-01-01 | End: 2020-01-01

## 2020-01-01 RX ORDER — ACETAMINOPHEN 500 MG
2 TABLET ORAL
Qty: 0 | Refills: 0 | DISCHARGE
Start: 2020-01-01

## 2020-01-01 RX ORDER — SODIUM CHLORIDE 9 MG/ML
1000 INJECTION, SOLUTION INTRAVENOUS
Refills: 0 | Status: DISCONTINUED | OUTPATIENT
Start: 2020-01-01 | End: 2020-01-01

## 2020-01-01 RX ORDER — LANOLIN ALCOHOL/MO/W.PET/CERES
1 CREAM (GRAM) TOPICAL
Qty: 0 | Refills: 0 | DISCHARGE
Start: 2020-01-01

## 2020-01-01 RX ORDER — HALOPERIDOL DECANOATE 100 MG/ML
0.5 INJECTION INTRAMUSCULAR EVERY 6 HOURS
Refills: 0 | Status: DISCONTINUED | OUTPATIENT
Start: 2020-01-01 | End: 2020-01-01

## 2020-01-01 RX ORDER — ESCITALOPRAM OXALATE 10 MG/1
1 TABLET, FILM COATED ORAL
Qty: 0 | Refills: 0 | DISCHARGE

## 2020-01-01 RX ORDER — ATORVASTATIN CALCIUM 80 MG/1
10 TABLET, FILM COATED ORAL AT BEDTIME
Refills: 0 | Status: DISCONTINUED | OUTPATIENT
Start: 2020-01-01 | End: 2020-01-01

## 2020-01-01 RX ORDER — SUCRALFATE 1 G
1 TABLET ORAL
Qty: 0 | Refills: 0 | DISCHARGE
Start: 2020-01-01

## 2020-01-01 RX ORDER — LEVETIRACETAM 250 MG/1
500 TABLET, FILM COATED ORAL EVERY 12 HOURS
Refills: 0 | Status: DISCONTINUED | OUTPATIENT
Start: 2020-01-01 | End: 2020-01-01

## 2020-01-01 RX ORDER — POLYETHYLENE GLYCOL 3350 17 G/17G
17 POWDER, FOR SOLUTION ORAL
Refills: 0 | Status: DISCONTINUED | OUTPATIENT
Start: 2020-01-01 | End: 2020-01-01

## 2020-01-01 RX ORDER — SODIUM SULFATE, POTASSIUM SULFATE, MAGNESIUM SULFATE 17.5; 3.13; 1.6 G/ML; G/ML; G/ML
17.5-3.13-1.6 SOLUTION, CONCENTRATE ORAL
Qty: 1 | Refills: 0 | Status: DISCONTINUED | COMMUNITY
Start: 2020-01-01 | End: 2020-01-01

## 2020-01-01 RX ORDER — LEVETIRACETAM 250 MG/1
2000 TABLET, FILM COATED ORAL ONCE
Refills: 0 | Status: COMPLETED | OUTPATIENT
Start: 2020-01-01 | End: 2020-01-01

## 2020-01-01 RX ORDER — SODIUM CHLORIDE 9 MG/ML
500 INJECTION INTRAMUSCULAR; INTRAVENOUS; SUBCUTANEOUS ONCE
Refills: 0 | Status: COMPLETED | OUTPATIENT
Start: 2020-01-01 | End: 2020-01-01

## 2020-01-01 RX ORDER — LACOSAMIDE 50 MG/1
1 TABLET ORAL
Qty: 0 | Refills: 0 | DISCHARGE
Start: 2020-01-01

## 2020-01-01 RX ORDER — MAGNESIUM SULFATE 500 MG/ML
2 VIAL (ML) INJECTION ONCE
Refills: 0 | Status: DISCONTINUED | OUTPATIENT
Start: 2020-01-01 | End: 2020-01-01

## 2020-01-01 RX ORDER — DEXAMETHASONE 0.5 MG/5ML
10 ELIXIR ORAL ONCE
Refills: 0 | Status: DISCONTINUED | OUTPATIENT
Start: 2020-01-01 | End: 2020-01-01

## 2020-01-01 RX ORDER — VANCOMYCIN HCL 1 G
1000 VIAL (EA) INTRAVENOUS EVERY 8 HOURS
Refills: 0 | Status: DISCONTINUED | OUTPATIENT
Start: 2020-01-01 | End: 2020-01-01

## 2020-01-01 RX ORDER — CEFAZOLIN SODIUM 1 G
2000 VIAL (EA) INJECTION EVERY 8 HOURS
Refills: 0 | Status: COMPLETED | OUTPATIENT
Start: 2020-01-01 | End: 2020-01-01

## 2020-01-01 RX ADMIN — LEVETIRACETAM 400 MILLIGRAM(S): 250 TABLET, FILM COATED ORAL at 10:16

## 2020-01-01 RX ADMIN — TAMSULOSIN HYDROCHLORIDE 0.4 MILLIGRAM(S): 0.4 CAPSULE ORAL at 21:03

## 2020-01-01 RX ADMIN — ESCITALOPRAM OXALATE 10 MILLIGRAM(S): 10 TABLET, FILM COATED ORAL at 19:00

## 2020-01-01 RX ADMIN — OXYCODONE AND ACETAMINOPHEN 1 TABLET(S): 5; 325 TABLET ORAL at 04:49

## 2020-01-01 RX ADMIN — Medication 112 MICROGRAM(S): at 06:01

## 2020-01-01 RX ADMIN — Medication 50 MILLIGRAM(S): at 21:03

## 2020-01-01 RX ADMIN — ESCITALOPRAM OXALATE 10 MILLIGRAM(S): 10 TABLET, FILM COATED ORAL at 10:32

## 2020-01-01 RX ADMIN — Medication 81 MILLIGRAM(S): at 11:07

## 2020-01-01 RX ADMIN — OXYCODONE AND ACETAMINOPHEN 1 TABLET(S): 5; 325 TABLET ORAL at 08:13

## 2020-01-01 RX ADMIN — Medication 56 MICROGRAM(S): at 23:36

## 2020-01-01 RX ADMIN — OXYCODONE AND ACETAMINOPHEN 1 TABLET(S): 5; 325 TABLET ORAL at 00:25

## 2020-01-01 RX ADMIN — Medication 25 MILLIGRAM(S): at 05:30

## 2020-01-01 RX ADMIN — Medication 25 MILLIGRAM(S): at 06:35

## 2020-01-01 RX ADMIN — Medication 50 MILLIGRAM(S): at 17:11

## 2020-01-01 RX ADMIN — OXYCODONE HYDROCHLORIDE 5 MILLIGRAM(S): 5 TABLET ORAL at 00:19

## 2020-01-01 RX ADMIN — APIXABAN 5 MILLIGRAM(S): 2.5 TABLET, FILM COATED ORAL at 05:12

## 2020-01-01 RX ADMIN — Medication 975 MILLIGRAM(S): at 13:49

## 2020-01-01 RX ADMIN — Medication 81 MILLIGRAM(S): at 17:35

## 2020-01-01 RX ADMIN — SODIUM CHLORIDE 90 MILLILITER(S): 9 INJECTION INTRAMUSCULAR; INTRAVENOUS; SUBCUTANEOUS at 18:04

## 2020-01-01 RX ADMIN — Medication 15 MILLIGRAM(S): at 10:30

## 2020-01-01 RX ADMIN — ESCITALOPRAM OXALATE 10 MILLIGRAM(S): 10 TABLET, FILM COATED ORAL at 11:37

## 2020-01-01 RX ADMIN — OXYCODONE AND ACETAMINOPHEN 1 TABLET(S): 5; 325 TABLET ORAL at 00:11

## 2020-01-01 RX ADMIN — Medication 50 GRAM(S): at 08:47

## 2020-01-01 RX ADMIN — Medication 3 MILLIGRAM(S): at 21:03

## 2020-01-01 RX ADMIN — TAMSULOSIN HYDROCHLORIDE 0.4 MILLIGRAM(S): 0.4 CAPSULE ORAL at 22:19

## 2020-01-01 RX ADMIN — Medication 650 MILLIGRAM(S): at 11:34

## 2020-01-01 RX ADMIN — OXYCODONE AND ACETAMINOPHEN 1 TABLET(S): 5; 325 TABLET ORAL at 00:43

## 2020-01-01 RX ADMIN — LEVETIRACETAM 1500 MILLIGRAM(S): 250 TABLET, FILM COATED ORAL at 17:53

## 2020-01-01 RX ADMIN — Medication 650 MILLIGRAM(S): at 21:48

## 2020-01-01 RX ADMIN — Medication 250 MILLIGRAM(S): at 23:36

## 2020-01-01 RX ADMIN — LIDOCAINE 1 PATCH: 4 CREAM TOPICAL at 19:00

## 2020-01-01 RX ADMIN — Medication 50 MILLIGRAM(S): at 19:00

## 2020-01-01 RX ADMIN — Medication 600 MILLIGRAM(S): at 17:14

## 2020-01-01 RX ADMIN — OXYCODONE HYDROCHLORIDE 5 MILLIGRAM(S): 5 TABLET ORAL at 15:54

## 2020-01-01 RX ADMIN — LACOSAMIDE 100 MILLIGRAM(S): 50 TABLET ORAL at 17:35

## 2020-01-01 RX ADMIN — ESCITALOPRAM OXALATE 10 MILLIGRAM(S): 10 TABLET, FILM COATED ORAL at 10:47

## 2020-01-01 RX ADMIN — Medication 50 MILLIGRAM(S): at 05:14

## 2020-01-01 RX ADMIN — APIXABAN 5 MILLIGRAM(S): 2.5 TABLET, FILM COATED ORAL at 06:01

## 2020-01-01 RX ADMIN — LACOSAMIDE 100 MILLIGRAM(S): 50 TABLET ORAL at 21:33

## 2020-01-01 RX ADMIN — Medication 81 MILLIGRAM(S): at 08:13

## 2020-01-01 RX ADMIN — SODIUM CHLORIDE 85 MILLILITER(S): 9 INJECTION, SOLUTION INTRAVENOUS at 14:01

## 2020-01-01 RX ADMIN — APIXABAN 5 MILLIGRAM(S): 2.5 TABLET, FILM COATED ORAL at 06:34

## 2020-01-01 RX ADMIN — LEVETIRACETAM 1500 MILLIGRAM(S): 250 TABLET, FILM COATED ORAL at 17:30

## 2020-01-01 RX ADMIN — Medication 3 MILLIGRAM(S): at 22:36

## 2020-01-01 RX ADMIN — LEVETIRACETAM 1500 MILLIGRAM(S): 250 TABLET, FILM COATED ORAL at 05:37

## 2020-01-01 RX ADMIN — Medication 112 MICROGRAM(S): at 05:00

## 2020-01-01 RX ADMIN — APIXABAN 5 MILLIGRAM(S): 2.5 TABLET, FILM COATED ORAL at 11:52

## 2020-01-01 RX ADMIN — Medication 50 MILLIGRAM(S): at 17:44

## 2020-01-01 RX ADMIN — Medication 81 MILLIGRAM(S): at 12:25

## 2020-01-01 RX ADMIN — Medication 50 MILLIGRAM(S): at 17:30

## 2020-01-01 RX ADMIN — ATORVASTATIN CALCIUM 10 MILLIGRAM(S): 80 TABLET, FILM COATED ORAL at 21:54

## 2020-01-01 RX ADMIN — ESCITALOPRAM OXALATE 10 MILLIGRAM(S): 10 TABLET, FILM COATED ORAL at 08:29

## 2020-01-01 RX ADMIN — Medication 975 MILLIGRAM(S): at 05:41

## 2020-01-01 RX ADMIN — Medication 25 MILLIGRAM(S): at 05:37

## 2020-01-01 RX ADMIN — Medication 50 MILLIGRAM(S): at 05:12

## 2020-01-01 RX ADMIN — Medication 81 MILLIGRAM(S): at 08:29

## 2020-01-01 RX ADMIN — POLYETHYLENE GLYCOL 3350 17 GRAM(S): 17 POWDER, FOR SOLUTION ORAL at 05:42

## 2020-01-01 RX ADMIN — APIXABAN 5 MILLIGRAM(S): 2.5 TABLET, FILM COATED ORAL at 05:30

## 2020-01-01 RX ADMIN — Medication 50 MILLIGRAM(S): at 06:01

## 2020-01-01 RX ADMIN — ATORVASTATIN CALCIUM 10 MILLIGRAM(S): 80 TABLET, FILM COATED ORAL at 22:35

## 2020-01-01 RX ADMIN — APIXABAN 5 MILLIGRAM(S): 2.5 TABLET, FILM COATED ORAL at 18:26

## 2020-01-01 RX ADMIN — Medication 112 MICROGRAM(S): at 13:52

## 2020-01-01 RX ADMIN — Medication 263 MILLIGRAM(S): at 22:12

## 2020-01-01 RX ADMIN — LEVETIRACETAM 400 MILLIGRAM(S): 250 TABLET, FILM COATED ORAL at 00:21

## 2020-01-01 RX ADMIN — Medication 50 GRAM(S): at 12:15

## 2020-01-01 RX ADMIN — OXYCODONE AND ACETAMINOPHEN 1 TABLET(S): 5; 325 TABLET ORAL at 15:30

## 2020-01-01 RX ADMIN — Medication 975 MILLIGRAM(S): at 22:37

## 2020-01-01 RX ADMIN — Medication 50 MILLIGRAM(S): at 22:39

## 2020-01-01 RX ADMIN — ATORVASTATIN CALCIUM 10 MILLIGRAM(S): 80 TABLET, FILM COATED ORAL at 21:33

## 2020-01-01 RX ADMIN — TAMSULOSIN HYDROCHLORIDE 0.4 MILLIGRAM(S): 0.4 CAPSULE ORAL at 21:54

## 2020-01-01 RX ADMIN — ESCITALOPRAM OXALATE 10 MILLIGRAM(S): 10 TABLET, FILM COATED ORAL at 10:24

## 2020-01-01 RX ADMIN — Medication 650 MILLIGRAM(S): at 11:36

## 2020-01-01 RX ADMIN — Medication 25 MILLIGRAM(S): at 08:12

## 2020-01-01 RX ADMIN — Medication 112 MICROGRAM(S): at 05:11

## 2020-01-01 RX ADMIN — TAMSULOSIN HYDROCHLORIDE 0.4 MILLIGRAM(S): 0.4 CAPSULE ORAL at 21:45

## 2020-01-01 RX ADMIN — Medication 650 MILLIGRAM(S): at 10:24

## 2020-01-01 RX ADMIN — Medication 50 MILLIGRAM(S): at 05:42

## 2020-01-01 RX ADMIN — Medication 1 GRAM(S): at 05:35

## 2020-01-01 RX ADMIN — LIDOCAINE 1 PATCH: 4 CREAM TOPICAL at 12:38

## 2020-01-01 RX ADMIN — LIDOCAINE 1 PATCH: 4 CREAM TOPICAL at 19:45

## 2020-01-01 RX ADMIN — APIXABAN 5 MILLIGRAM(S): 2.5 TABLET, FILM COATED ORAL at 17:39

## 2020-01-01 RX ADMIN — Medication 50 MILLIGRAM(S): at 17:00

## 2020-01-01 RX ADMIN — PANTOPRAZOLE SODIUM 40 MILLIGRAM(S): 20 TABLET, DELAYED RELEASE ORAL at 09:09

## 2020-01-01 RX ADMIN — Medication 25 MILLIGRAM(S): at 17:25

## 2020-01-01 RX ADMIN — APIXABAN 5 MILLIGRAM(S): 2.5 TABLET, FILM COATED ORAL at 17:00

## 2020-01-01 RX ADMIN — LEVETIRACETAM 1500 MILLIGRAM(S): 250 TABLET, FILM COATED ORAL at 05:24

## 2020-01-01 RX ADMIN — OXYCODONE HYDROCHLORIDE 5 MILLIGRAM(S): 5 TABLET ORAL at 10:24

## 2020-01-01 RX ADMIN — Medication 650 MILLIGRAM(S): at 21:50

## 2020-01-01 RX ADMIN — Medication 50 MILLIGRAM(S): at 17:18

## 2020-01-01 RX ADMIN — Medication 50 MILLIGRAM(S): at 05:17

## 2020-01-01 RX ADMIN — TAMSULOSIN HYDROCHLORIDE 0.4 MILLIGRAM(S): 0.4 CAPSULE ORAL at 21:41

## 2020-01-01 RX ADMIN — OXYCODONE AND ACETAMINOPHEN 1 TABLET(S): 5; 325 TABLET ORAL at 20:36

## 2020-01-01 RX ADMIN — ESCITALOPRAM OXALATE 10 MILLIGRAM(S): 10 TABLET, FILM COATED ORAL at 11:18

## 2020-01-01 RX ADMIN — Medication 100 MILLIGRAM(S): at 15:09

## 2020-01-01 RX ADMIN — HYDROMORPHONE HYDROCHLORIDE 1 MILLIGRAM(S): 2 INJECTION INTRAMUSCULAR; INTRAVENOUS; SUBCUTANEOUS at 10:04

## 2020-01-01 RX ADMIN — ATORVASTATIN CALCIUM 10 MILLIGRAM(S): 80 TABLET, FILM COATED ORAL at 21:41

## 2020-01-01 RX ADMIN — LEVETIRACETAM 600 MILLIGRAM(S): 250 TABLET, FILM COATED ORAL at 12:30

## 2020-01-01 RX ADMIN — POLYETHYLENE GLYCOL 3350 17 GRAM(S): 17 POWDER, FOR SOLUTION ORAL at 16:51

## 2020-01-01 RX ADMIN — Medication 81 MILLIGRAM(S): at 11:40

## 2020-01-01 RX ADMIN — Medication 50 MILLIGRAM(S): at 17:35

## 2020-01-01 RX ADMIN — Medication 112 MICROGRAM(S): at 05:12

## 2020-01-01 RX ADMIN — Medication 25 MILLIGRAM(S): at 05:12

## 2020-01-01 RX ADMIN — TAMSULOSIN HYDROCHLORIDE 0.4 MILLIGRAM(S): 0.4 CAPSULE ORAL at 23:00

## 2020-01-01 RX ADMIN — TAMSULOSIN HYDROCHLORIDE 0.4 MILLIGRAM(S): 0.4 CAPSULE ORAL at 20:36

## 2020-01-01 RX ADMIN — OXYCODONE AND ACETAMINOPHEN 1 TABLET(S): 5; 325 TABLET ORAL at 23:00

## 2020-01-01 RX ADMIN — LEVETIRACETAM 1500 MILLIGRAM(S): 250 TABLET, FILM COATED ORAL at 05:12

## 2020-01-01 RX ADMIN — SODIUM CHLORIDE 100 MILLILITER(S): 9 INJECTION INTRAMUSCULAR; INTRAVENOUS; SUBCUTANEOUS at 23:11

## 2020-01-01 RX ADMIN — LACOSAMIDE 100 MILLIGRAM(S): 50 TABLET ORAL at 18:04

## 2020-01-01 RX ADMIN — Medication 216 GRAM(S): at 06:09

## 2020-01-01 RX ADMIN — LEVETIRACETAM 1500 MILLIGRAM(S): 250 TABLET, FILM COATED ORAL at 05:18

## 2020-01-01 RX ADMIN — LEVETIRACETAM 420 MILLIGRAM(S): 250 TABLET, FILM COATED ORAL at 17:31

## 2020-01-01 RX ADMIN — PANTOPRAZOLE SODIUM 40 MILLIGRAM(S): 20 TABLET, DELAYED RELEASE ORAL at 05:18

## 2020-01-01 RX ADMIN — Medication 975 MILLIGRAM(S): at 05:36

## 2020-01-01 RX ADMIN — ESCITALOPRAM OXALATE 10 MILLIGRAM(S): 10 TABLET, FILM COATED ORAL at 12:25

## 2020-01-01 RX ADMIN — Medication 50 MILLIGRAM(S): at 05:57

## 2020-01-01 RX ADMIN — TAMSULOSIN HYDROCHLORIDE 0.4 MILLIGRAM(S): 0.4 CAPSULE ORAL at 20:54

## 2020-01-01 RX ADMIN — LACOSAMIDE 100 MILLIGRAM(S): 50 TABLET ORAL at 18:26

## 2020-01-01 RX ADMIN — TAMSULOSIN HYDROCHLORIDE 0.4 MILLIGRAM(S): 0.4 CAPSULE ORAL at 21:19

## 2020-01-01 RX ADMIN — LEVETIRACETAM 1500 MILLIGRAM(S): 250 TABLET, FILM COATED ORAL at 17:39

## 2020-01-01 RX ADMIN — ATORVASTATIN CALCIUM 10 MILLIGRAM(S): 80 TABLET, FILM COATED ORAL at 21:45

## 2020-01-01 RX ADMIN — OXYCODONE AND ACETAMINOPHEN 1 TABLET(S): 5; 325 TABLET ORAL at 09:00

## 2020-01-01 RX ADMIN — Medication 112 MICROGRAM(S): at 11:07

## 2020-01-01 RX ADMIN — Medication 100 MILLIGRAM(S): at 23:26

## 2020-01-01 RX ADMIN — LIDOCAINE 1 PATCH: 4 CREAM TOPICAL at 11:52

## 2020-01-01 RX ADMIN — Medication 2 MILLIGRAM(S): at 18:20

## 2020-01-01 RX ADMIN — Medication 50 MILLIGRAM(S): at 06:35

## 2020-01-01 RX ADMIN — APIXABAN 5 MILLIGRAM(S): 2.5 TABLET, FILM COATED ORAL at 21:19

## 2020-01-01 RX ADMIN — APIXABAN 5 MILLIGRAM(S): 2.5 TABLET, FILM COATED ORAL at 18:04

## 2020-01-01 RX ADMIN — APIXABAN 5 MILLIGRAM(S): 2.5 TABLET, FILM COATED ORAL at 17:25

## 2020-01-01 RX ADMIN — LEVETIRACETAM 420 MILLIGRAM(S): 250 TABLET, FILM COATED ORAL at 05:02

## 2020-01-01 RX ADMIN — PANTOPRAZOLE SODIUM 40 MILLIGRAM(S): 20 TABLET, DELAYED RELEASE ORAL at 05:35

## 2020-01-01 RX ADMIN — CEFTRIAXONE 100 MILLIGRAM(S): 500 INJECTION, POWDER, FOR SOLUTION INTRAMUSCULAR; INTRAVENOUS at 08:48

## 2020-01-01 RX ADMIN — Medication 1 GRAM(S): at 17:17

## 2020-01-01 RX ADMIN — Medication 3 MILLIGRAM(S): at 21:19

## 2020-01-01 RX ADMIN — Medication 112 MICROGRAM(S): at 05:22

## 2020-01-01 RX ADMIN — Medication 650 MILLIGRAM(S): at 19:47

## 2020-01-01 RX ADMIN — APIXABAN 5 MILLIGRAM(S): 2.5 TABLET, FILM COATED ORAL at 20:54

## 2020-01-01 RX ADMIN — LIDOCAINE 1 PATCH: 4 CREAM TOPICAL at 11:18

## 2020-01-01 RX ADMIN — SODIUM CHLORIDE 1000 MILLILITER(S): 9 INJECTION, SOLUTION INTRAVENOUS at 18:27

## 2020-01-01 RX ADMIN — OXYCODONE AND ACETAMINOPHEN 1 TABLET(S): 5; 325 TABLET ORAL at 07:25

## 2020-01-01 RX ADMIN — Medication 50 MILLIGRAM(S): at 18:04

## 2020-01-01 RX ADMIN — SODIUM CHLORIDE 500 MILLILITER(S): 9 INJECTION, SOLUTION INTRAVENOUS at 22:24

## 2020-01-01 RX ADMIN — Medication 112 MICROGRAM(S): at 05:18

## 2020-01-01 RX ADMIN — Medication 50 MILLIGRAM(S): at 05:24

## 2020-01-01 RX ADMIN — ATORVASTATIN CALCIUM 10 MILLIGRAM(S): 80 TABLET, FILM COATED ORAL at 21:04

## 2020-01-01 RX ADMIN — Medication 650 MILLIGRAM(S): at 20:53

## 2020-01-01 RX ADMIN — ATORVASTATIN CALCIUM 10 MILLIGRAM(S): 80 TABLET, FILM COATED ORAL at 21:38

## 2020-01-01 RX ADMIN — OXYCODONE AND ACETAMINOPHEN 1 TABLET(S): 5; 325 TABLET ORAL at 21:20

## 2020-01-01 RX ADMIN — Medication 650 MILLIGRAM(S): at 21:04

## 2020-01-01 RX ADMIN — OXYCODONE AND ACETAMINOPHEN 1 TABLET(S): 5; 325 TABLET ORAL at 15:01

## 2020-01-01 RX ADMIN — Medication 81 MILLIGRAM(S): at 11:18

## 2020-01-01 RX ADMIN — APIXABAN 5 MILLIGRAM(S): 2.5 TABLET, FILM COATED ORAL at 22:19

## 2020-01-01 RX ADMIN — LIDOCAINE 1 PATCH: 4 CREAM TOPICAL at 00:36

## 2020-01-01 RX ADMIN — LACOSAMIDE 100 MILLIGRAM(S): 50 TABLET ORAL at 17:22

## 2020-01-01 RX ADMIN — Medication 600 MILLIGRAM(S): at 16:51

## 2020-01-01 RX ADMIN — LEVETIRACETAM 1500 MILLIGRAM(S): 250 TABLET, FILM COATED ORAL at 05:01

## 2020-01-01 RX ADMIN — LEVETIRACETAM 420 MILLIGRAM(S): 250 TABLET, FILM COATED ORAL at 08:48

## 2020-01-01 RX ADMIN — Medication 112 MICROGRAM(S): at 05:57

## 2020-01-01 RX ADMIN — Medication 50 MILLIGRAM(S): at 13:48

## 2020-01-01 RX ADMIN — TAMSULOSIN HYDROCHLORIDE 0.4 MILLIGRAM(S): 0.4 CAPSULE ORAL at 20:53

## 2020-01-01 RX ADMIN — TAMSULOSIN HYDROCHLORIDE 0.4 MILLIGRAM(S): 0.4 CAPSULE ORAL at 22:36

## 2020-01-01 RX ADMIN — PANTOPRAZOLE SODIUM 40 MILLIGRAM(S): 20 TABLET, DELAYED RELEASE ORAL at 05:12

## 2020-01-01 RX ADMIN — Medication 81 MILLIGRAM(S): at 14:47

## 2020-01-01 RX ADMIN — Medication 112 MICROGRAM(S): at 06:35

## 2020-01-01 RX ADMIN — Medication 81 MILLIGRAM(S): at 11:52

## 2020-01-01 RX ADMIN — Medication 216 GRAM(S): at 21:35

## 2020-01-01 RX ADMIN — Medication 1 GRAM(S): at 18:10

## 2020-01-01 RX ADMIN — LACOSAMIDE 100 MILLIGRAM(S): 50 TABLET ORAL at 06:01

## 2020-01-01 RX ADMIN — APIXABAN 5 MILLIGRAM(S): 2.5 TABLET, FILM COATED ORAL at 21:33

## 2020-01-01 RX ADMIN — Medication 50 MILLIGRAM(S): at 17:39

## 2020-01-01 RX ADMIN — LACOSAMIDE 100 MILLIGRAM(S): 50 TABLET ORAL at 05:16

## 2020-01-01 RX ADMIN — Medication 600 MILLIGRAM(S): at 17:18

## 2020-01-01 RX ADMIN — Medication 50 MILLIGRAM(S): at 18:26

## 2020-01-01 RX ADMIN — Medication 81 MILLIGRAM(S): at 11:33

## 2020-01-01 RX ADMIN — OXYCODONE AND ACETAMINOPHEN 1 TABLET(S): 5; 325 TABLET ORAL at 13:26

## 2020-01-01 RX ADMIN — Medication 25 MILLIGRAM(S): at 17:11

## 2020-01-01 RX ADMIN — Medication 25 MILLIGRAM(S): at 05:14

## 2020-01-01 RX ADMIN — Medication 650 MILLIGRAM(S): at 12:45

## 2020-01-01 RX ADMIN — Medication 600 MILLIGRAM(S): at 05:41

## 2020-01-01 RX ADMIN — LEVETIRACETAM 1500 MILLIGRAM(S): 250 TABLET, FILM COATED ORAL at 18:04

## 2020-01-01 RX ADMIN — Medication 81 MILLIGRAM(S): at 11:28

## 2020-01-01 RX ADMIN — ESCITALOPRAM OXALATE 10 MILLIGRAM(S): 10 TABLET, FILM COATED ORAL at 11:40

## 2020-01-01 RX ADMIN — LEVETIRACETAM 1500 MILLIGRAM(S): 250 TABLET, FILM COATED ORAL at 06:01

## 2020-01-01 RX ADMIN — ESCITALOPRAM OXALATE 10 MILLIGRAM(S): 10 TABLET, FILM COATED ORAL at 08:12

## 2020-01-01 RX ADMIN — Medication 81 MILLIGRAM(S): at 11:21

## 2020-01-01 RX ADMIN — TAMSULOSIN HYDROCHLORIDE 0.4 MILLIGRAM(S): 0.4 CAPSULE ORAL at 21:33

## 2020-01-01 RX ADMIN — APIXABAN 5 MILLIGRAM(S): 2.5 TABLET, FILM COATED ORAL at 17:11

## 2020-01-01 RX ADMIN — OXYCODONE AND ACETAMINOPHEN 1 TABLET(S): 5; 325 TABLET ORAL at 13:27

## 2020-01-01 RX ADMIN — OXYCODONE AND ACETAMINOPHEN 1 TABLET(S): 5; 325 TABLET ORAL at 11:34

## 2020-01-01 RX ADMIN — ATORVASTATIN CALCIUM 10 MILLIGRAM(S): 80 TABLET, FILM COATED ORAL at 20:36

## 2020-01-01 RX ADMIN — APIXABAN 5 MILLIGRAM(S): 2.5 TABLET, FILM COATED ORAL at 05:01

## 2020-01-01 RX ADMIN — LACOSAMIDE 100 MILLIGRAM(S): 50 TABLET ORAL at 17:30

## 2020-01-01 RX ADMIN — ESCITALOPRAM OXALATE 10 MILLIGRAM(S): 10 TABLET, FILM COATED ORAL at 11:52

## 2020-01-01 RX ADMIN — APIXABAN 5 MILLIGRAM(S): 2.5 TABLET, FILM COATED ORAL at 11:21

## 2020-01-01 RX ADMIN — APIXABAN 5 MILLIGRAM(S): 2.5 TABLET, FILM COATED ORAL at 11:28

## 2020-01-01 RX ADMIN — LEVETIRACETAM 400 MILLIGRAM(S): 250 TABLET, FILM COATED ORAL at 20:10

## 2020-01-01 RX ADMIN — CEFTRIAXONE 100 MILLIGRAM(S): 500 INJECTION, POWDER, FOR SOLUTION INTRAMUSCULAR; INTRAVENOUS at 20:36

## 2020-01-01 RX ADMIN — APIXABAN 5 MILLIGRAM(S): 2.5 TABLET, FILM COATED ORAL at 17:34

## 2020-01-01 RX ADMIN — ATORVASTATIN CALCIUM 10 MILLIGRAM(S): 80 TABLET, FILM COATED ORAL at 22:19

## 2020-01-01 RX ADMIN — Medication 15 MILLIGRAM(S): at 11:02

## 2020-01-01 RX ADMIN — Medication 50 MILLIGRAM(S): at 17:22

## 2020-01-01 RX ADMIN — Medication 975 MILLIGRAM(S): at 21:19

## 2020-01-01 RX ADMIN — Medication 81 MILLIGRAM(S): at 11:37

## 2020-01-01 RX ADMIN — OXYCODONE HYDROCHLORIDE 5 MILLIGRAM(S): 5 TABLET ORAL at 16:50

## 2020-01-01 RX ADMIN — Medication 975 MILLIGRAM(S): at 13:47

## 2020-01-01 RX ADMIN — ESCITALOPRAM OXALATE 10 MILLIGRAM(S): 10 TABLET, FILM COATED ORAL at 11:33

## 2020-01-01 RX ADMIN — Medication 50 MILLIGRAM(S): at 16:51

## 2020-01-01 RX ADMIN — HALOPERIDOL DECANOATE 0.5 MILLIGRAM(S): 100 INJECTION INTRAMUSCULAR at 22:47

## 2020-01-01 RX ADMIN — OXYCODONE AND ACETAMINOPHEN 1 TABLET(S): 5; 325 TABLET ORAL at 08:29

## 2020-01-01 RX ADMIN — Medication 263 MILLIGRAM(S): at 06:08

## 2020-01-01 RX ADMIN — LACOSAMIDE 100 MILLIGRAM(S): 50 TABLET ORAL at 17:00

## 2020-01-01 RX ADMIN — ESCITALOPRAM OXALATE 10 MILLIGRAM(S): 10 TABLET, FILM COATED ORAL at 11:21

## 2020-01-01 RX ADMIN — LACOSAMIDE 100 MILLIGRAM(S): 50 TABLET ORAL at 05:23

## 2020-01-01 RX ADMIN — Medication 650 MILLIGRAM(S): at 09:52

## 2020-01-01 RX ADMIN — ESCITALOPRAM OXALATE 10 MILLIGRAM(S): 10 TABLET, FILM COATED ORAL at 17:34

## 2020-01-01 RX ADMIN — Medication 112 MICROGRAM(S): at 05:34

## 2020-01-01 RX ADMIN — CEFTRIAXONE 100 MILLIGRAM(S): 500 INJECTION, POWDER, FOR SOLUTION INTRAMUSCULAR; INTRAVENOUS at 23:00

## 2020-01-01 RX ADMIN — CEFTRIAXONE 100 MILLIGRAM(S): 500 INJECTION, POWDER, FOR SOLUTION INTRAMUSCULAR; INTRAVENOUS at 21:53

## 2020-01-01 RX ADMIN — PANTOPRAZOLE SODIUM 40 MILLIGRAM(S): 20 TABLET, DELAYED RELEASE ORAL at 05:37

## 2020-01-01 RX ADMIN — Medication 2 MILLIGRAM(S): at 01:31

## 2020-01-01 RX ADMIN — Medication 50 MILLIGRAM(S): at 17:21

## 2020-01-01 RX ADMIN — APIXABAN 5 MILLIGRAM(S): 2.5 TABLET, FILM COATED ORAL at 05:18

## 2020-01-01 RX ADMIN — LEVETIRACETAM 1000 MILLIGRAM(S): 250 TABLET, FILM COATED ORAL at 17:22

## 2020-01-01 RX ADMIN — HYDROMORPHONE HYDROCHLORIDE 1 MILLIGRAM(S): 2 INJECTION INTRAMUSCULAR; INTRAVENOUS; SUBCUTANEOUS at 10:34

## 2020-01-01 RX ADMIN — LIDOCAINE 1 PATCH: 4 CREAM TOPICAL at 11:21

## 2020-01-01 RX ADMIN — Medication 2 MILLIGRAM(S): at 20:40

## 2020-01-01 RX ADMIN — LACOSAMIDE 100 MILLIGRAM(S): 50 TABLET ORAL at 05:18

## 2020-01-01 RX ADMIN — LEVETIRACETAM 400 MILLIGRAM(S): 250 TABLET, FILM COATED ORAL at 20:11

## 2020-01-01 RX ADMIN — LIDOCAINE 1 PATCH: 4 CREAM TOPICAL at 13:52

## 2020-01-01 RX ADMIN — ATORVASTATIN CALCIUM 10 MILLIGRAM(S): 80 TABLET, FILM COATED ORAL at 21:19

## 2020-01-01 RX ADMIN — OXYCODONE AND ACETAMINOPHEN 1 TABLET(S): 5; 325 TABLET ORAL at 00:40

## 2020-01-01 RX ADMIN — Medication 20 MILLIEQUIVALENT(S): at 14:47

## 2020-01-01 RX ADMIN — OXYCODONE AND ACETAMINOPHEN 1 TABLET(S): 5; 325 TABLET ORAL at 22:13

## 2020-01-01 RX ADMIN — Medication 112 MICROGRAM(S): at 05:24

## 2020-01-01 RX ADMIN — LEVETIRACETAM 400 MILLIGRAM(S): 250 TABLET, FILM COATED ORAL at 23:11

## 2020-01-01 RX ADMIN — ATORVASTATIN CALCIUM 10 MILLIGRAM(S): 80 TABLET, FILM COATED ORAL at 20:54

## 2020-01-01 RX ADMIN — ATORVASTATIN CALCIUM 10 MILLIGRAM(S): 80 TABLET, FILM COATED ORAL at 23:00

## 2020-01-01 RX ADMIN — ESCITALOPRAM OXALATE 10 MILLIGRAM(S): 10 TABLET, FILM COATED ORAL at 11:28

## 2020-01-01 RX ADMIN — LIDOCAINE 1 PATCH: 4 CREAM TOPICAL at 19:24

## 2020-01-01 RX ADMIN — Medication 25 MILLIGRAM(S): at 05:18

## 2020-01-01 RX ADMIN — SODIUM CHLORIDE 1000 MILLILITER(S): 9 INJECTION INTRAMUSCULAR; INTRAVENOUS; SUBCUTANEOUS at 12:45

## 2020-01-01 RX ADMIN — Medication 50 MILLIGRAM(S): at 05:18

## 2020-01-01 RX ADMIN — LEVETIRACETAM 1500 MILLIGRAM(S): 250 TABLET, FILM COATED ORAL at 17:00

## 2020-01-01 RX ADMIN — APIXABAN 5 MILLIGRAM(S): 2.5 TABLET, FILM COATED ORAL at 11:18

## 2020-01-01 RX ADMIN — Medication 25 MILLIGRAM(S): at 17:30

## 2020-01-01 RX ADMIN — Medication 81 MILLIGRAM(S): at 13:52

## 2020-01-01 RX ADMIN — LEVETIRACETAM 1000 MILLIGRAM(S): 250 TABLET, FILM COATED ORAL at 10:34

## 2020-01-01 RX ADMIN — LIDOCAINE 1 PATCH: 4 CREAM TOPICAL at 00:21

## 2020-01-01 RX ADMIN — OXYCODONE AND ACETAMINOPHEN 1 TABLET(S): 5; 325 TABLET ORAL at 14:30

## 2020-01-01 RX ADMIN — LIDOCAINE 1 PATCH: 4 CREAM TOPICAL at 19:26

## 2020-01-01 RX ADMIN — Medication 1 GRAM(S): at 16:51

## 2020-01-01 RX ADMIN — APIXABAN 5 MILLIGRAM(S): 2.5 TABLET, FILM COATED ORAL at 14:46

## 2020-01-01 RX ADMIN — Medication 50 MILLIGRAM(S): at 17:53

## 2020-01-01 RX ADMIN — ESCITALOPRAM OXALATE 10 MILLIGRAM(S): 10 TABLET, FILM COATED ORAL at 11:32

## 2020-01-01 RX ADMIN — OXYCODONE AND ACETAMINOPHEN 1 TABLET(S): 5; 325 TABLET ORAL at 03:35

## 2020-01-01 RX ADMIN — Medication 112 MICROGRAM(S): at 05:14

## 2020-01-01 RX ADMIN — APIXABAN 5 MILLIGRAM(S): 2.5 TABLET, FILM COATED ORAL at 22:38

## 2020-01-01 RX ADMIN — Medication 25 MILLIGRAM(S): at 18:04

## 2020-01-01 RX ADMIN — APIXABAN 5 MILLIGRAM(S): 2.5 TABLET, FILM COATED ORAL at 21:03

## 2020-01-01 RX ADMIN — Medication 112 MICROGRAM(S): at 05:01

## 2020-01-01 RX ADMIN — SODIUM CHLORIDE 1000 MILLILITER(S): 9 INJECTION, SOLUTION INTRAVENOUS at 15:44

## 2020-01-01 RX ADMIN — Medication 50 MILLIGRAM(S): at 05:01

## 2020-01-01 RX ADMIN — LEVETIRACETAM 1500 MILLIGRAM(S): 250 TABLET, FILM COATED ORAL at 17:34

## 2020-01-01 RX ADMIN — LACOSAMIDE 100 MILLIGRAM(S): 50 TABLET ORAL at 17:12

## 2020-01-01 RX ADMIN — LACOSAMIDE 100 MILLIGRAM(S): 50 TABLET ORAL at 06:39

## 2020-01-01 RX ADMIN — LIDOCAINE 1 PATCH: 4 CREAM TOPICAL at 11:39

## 2020-01-01 RX ADMIN — APIXABAN 5 MILLIGRAM(S): 2.5 TABLET, FILM COATED ORAL at 21:04

## 2020-01-01 RX ADMIN — Medication 50 MILLIGRAM(S): at 05:33

## 2020-01-01 RX ADMIN — Medication 81 MILLIGRAM(S): at 10:32

## 2020-01-01 RX ADMIN — Medication 50 MILLIGRAM(S): at 17:31

## 2020-01-01 RX ADMIN — APIXABAN 5 MILLIGRAM(S): 2.5 TABLET, FILM COATED ORAL at 11:32

## 2020-01-01 RX ADMIN — Medication 50 MILLIGRAM(S): at 17:14

## 2020-01-01 RX ADMIN — LEVETIRACETAM 1500 MILLIGRAM(S): 250 TABLET, FILM COATED ORAL at 06:35

## 2020-01-01 RX ADMIN — Medication 50 MILLIGRAM(S): at 05:21

## 2020-01-01 RX ADMIN — Medication 1 GRAM(S): at 05:41

## 2020-01-01 RX ADMIN — Medication 600 MILLIGRAM(S): at 05:35

## 2020-01-01 RX ADMIN — OXYCODONE AND ACETAMINOPHEN 1 TABLET(S): 5; 325 TABLET ORAL at 03:45

## 2020-01-01 RX ADMIN — Medication 250 MILLIGRAM(S): at 09:33

## 2020-01-01 RX ADMIN — LEVETIRACETAM 1000 MILLIGRAM(S): 250 TABLET, FILM COATED ORAL at 18:35

## 2020-01-01 RX ADMIN — ESCITALOPRAM OXALATE 10 MILLIGRAM(S): 10 TABLET, FILM COATED ORAL at 11:07

## 2020-01-01 RX ADMIN — Medication 216 GRAM(S): at 09:33

## 2020-01-01 RX ADMIN — Medication 81 MILLIGRAM(S): at 11:32

## 2020-01-01 RX ADMIN — Medication 112 MICROGRAM(S): at 05:37

## 2020-01-01 RX ADMIN — Medication 112 MICROGRAM(S): at 14:46

## 2020-01-01 RX ADMIN — Medication 50 MILLIGRAM(S): at 05:00

## 2020-01-01 RX ADMIN — Medication 50 MILLIGRAM(S): at 05:41

## 2020-01-01 RX ADMIN — OXYCODONE AND ACETAMINOPHEN 1 TABLET(S): 5; 325 TABLET ORAL at 09:15

## 2020-01-01 RX ADMIN — LEVETIRACETAM 1500 MILLIGRAM(S): 250 TABLET, FILM COATED ORAL at 17:12

## 2020-01-01 RX ADMIN — Medication 50 MILLIGRAM(S): at 18:59

## 2020-01-01 RX ADMIN — Medication 50 MILLIGRAM(S): at 21:19

## 2020-01-01 RX ADMIN — OXYCODONE AND ACETAMINOPHEN 1 TABLET(S): 5; 325 TABLET ORAL at 12:25

## 2020-01-01 RX ADMIN — CYCLOBENZAPRINE HYDROCHLORIDE 5 MILLIGRAM(S): 10 TABLET, FILM COATED ORAL at 14:13

## 2020-01-01 RX ADMIN — LACOSAMIDE 100 MILLIGRAM(S): 50 TABLET ORAL at 05:01

## 2020-01-01 RX ADMIN — TAMSULOSIN HYDROCHLORIDE 0.4 MILLIGRAM(S): 0.4 CAPSULE ORAL at 21:04

## 2020-01-01 RX ADMIN — Medication 81 MILLIGRAM(S): at 10:24

## 2020-01-01 RX ADMIN — CHLORHEXIDINE GLUCONATE 1 APPLICATION(S): 213 SOLUTION TOPICAL at 07:06

## 2020-01-01 RX ADMIN — Medication 650 MILLIGRAM(S): at 18:43

## 2020-01-01 RX ADMIN — LIDOCAINE 1 PATCH: 4 CREAM TOPICAL at 22:54

## 2020-01-01 RX ADMIN — LEVETIRACETAM 1500 MILLIGRAM(S): 250 TABLET, FILM COATED ORAL at 18:26

## 2020-01-01 RX ADMIN — APIXABAN 5 MILLIGRAM(S): 2.5 TABLET, FILM COATED ORAL at 11:40

## 2020-01-01 RX ADMIN — CEFTRIAXONE 100 MILLIGRAM(S): 500 INJECTION, POWDER, FOR SOLUTION INTRAMUSCULAR; INTRAVENOUS at 21:00

## 2020-01-01 RX ADMIN — ATORVASTATIN CALCIUM 10 MILLIGRAM(S): 80 TABLET, FILM COATED ORAL at 21:03

## 2020-01-01 RX ADMIN — ATORVASTATIN CALCIUM 10 MILLIGRAM(S): 80 TABLET, FILM COATED ORAL at 21:20

## 2020-01-01 RX ADMIN — Medication 25 MILLIGRAM(S): at 11:07

## 2020-01-01 RX ADMIN — LEVETIRACETAM 400 MILLIGRAM(S): 250 TABLET, FILM COATED ORAL at 18:20

## 2020-01-01 RX ADMIN — Medication 650 MILLIGRAM(S): at 17:40

## 2020-01-01 RX ADMIN — Medication 50 MILLIGRAM(S): at 13:47

## 2020-01-01 RX ADMIN — OXYCODONE AND ACETAMINOPHEN 1 TABLET(S): 5; 325 TABLET ORAL at 23:29

## 2020-01-01 RX ADMIN — Medication 50 MILLIGRAM(S): at 05:13

## 2020-01-01 RX ADMIN — ATORVASTATIN CALCIUM 10 MILLIGRAM(S): 80 TABLET, FILM COATED ORAL at 20:53

## 2020-01-01 RX ADMIN — Medication 102 MILLIGRAM(S): at 22:24

## 2020-01-01 RX ADMIN — Medication 50 MILLIGRAM(S): at 17:09

## 2020-01-01 RX ADMIN — Medication 112 MICROGRAM(S): at 05:42

## 2020-01-01 RX ADMIN — Medication 50 MILLIGRAM(S): at 05:34

## 2020-01-01 RX ADMIN — APIXABAN 5 MILLIGRAM(S): 2.5 TABLET, FILM COATED ORAL at 10:24

## 2020-01-01 RX ADMIN — Medication 50 MILLIGRAM(S): at 13:20

## 2020-01-01 RX ADMIN — Medication 81 MILLIGRAM(S): at 07:29

## 2020-01-01 RX ADMIN — Medication 216 GRAM(S): at 02:28

## 2020-01-02 PROBLEM — I48.0 PAROXYSMAL ATRIAL FIBRILLATION: Status: ACTIVE | Noted: 2020-01-01

## 2020-01-02 NOTE — REVIEW OF SYSTEMS
[Recent Change In Weight] : ~T recent weight change [Constipation] : constipation [Postnasal Drip] : no postnasal drip

## 2020-01-02 NOTE — PHYSICAL EXAM
[No Acute Distress] : no acute distress [Well Nourished] : well nourished [Well Developed] : well developed [Well-Appearing] : well-appearing [Normal Sclera/Conjunctiva] : normal sclera/conjunctiva [PERRL] : pupils equal round and reactive to light [Normal Outer Ear/Nose] : the outer ears and nose were normal in appearance [EOMI] : extraocular movements intact [No JVD] : no jugular venous distention [Normal Oropharynx] : the oropharynx was normal [Supple] : supple [No Lymphadenopathy] : no lymphadenopathy [Thyroid Normal, No Nodules] : the thyroid was normal and there were no nodules present [No Respiratory Distress] : no respiratory distress  [No Accessory Muscle Use] : no accessory muscle use [Clear to Auscultation] : lungs were clear to auscultation bilaterally [Normal Rate] : normal rate  [Regular Rhythm] : with a regular rhythm [Normal S1, S2] : normal S1 and S2 [No Murmur] : no murmur heard [No Carotid Bruits] : no carotid bruits [No Abdominal Bruit] : a ~M bruit was not heard ~T in the abdomen [No Varicosities] : no varicosities [Pedal Pulses Present] : the pedal pulses are present [No Edema] : there was no peripheral edema [No Palpable Aorta] : no palpable aorta [No Extremity Clubbing/Cyanosis] : no extremity clubbing/cyanosis [Soft] : abdomen soft [Non Tender] : non-tender [No Masses] : no abdominal mass palpated [Non-distended] : non-distended [Normal Posterior Cervical Nodes] : no posterior cervical lymphadenopathy [No HSM] : no HSM [Normal Bowel Sounds] : normal bowel sounds [No CVA Tenderness] : no CVA  tenderness [Normal Anterior Cervical Nodes] : no anterior cervical lymphadenopathy [No Joint Swelling] : no joint swelling [No Spinal Tenderness] : no spinal tenderness [Grossly Normal Strength/Tone] : grossly normal strength/tone [No Rash] : no rash [Coordination Grossly Intact] : coordination grossly intact [Normal Gait] : normal gait [No Focal Deficits] : no focal deficits [Deep Tendon Reflexes (DTR)] : deep tendon reflexes were 2+ and symmetric [Normal Affect] : the affect was normal [Normal Insight/Judgement] : insight and judgment were intact

## 2020-01-02 NOTE — ASSESSMENT
[FreeTextEntry1] : 68 y/o F presents today for f/up \par \par Unintentional weight loss/ BM changes:\par -Pending Colonoscopy> FOBT with kit and instructions provided.\par -Blood test today\par \par COPD/ Hx Pulmonary Nodules?> Opacities in RTLL and LeftLL\par -F/up with Pulminology, Dr Carter\par -Last CT LDCT 02/20/18\par -New CT LDCT referral.\par \par Chronic back pain \par -Pt under works comp case.\par -Seen by Neurosx, Dr Rusty Ponce in [facility].\par -S/P Neurostimulator removal.\par -On Neurontin 300mg tid.\par \par Immunizations:\par -Flu and Prevnar : up to date\par -Shingrix : on hold\par \par HCM:\par -6/20/19\par \par Mammo: 2019\par \par Gyn: 2019\par \par Colonoscopy: needs to reschedule\par \par Hx Breast Cancer in remission;\par -f/up w/ oncology Dr Grady.\par \par  Osteoporosis:\par -On Ibandronate mthly.\par \par A.Fib:\par -Cardiology f/up: Dr Aragon/ [facility]\par -on flecainide and metoprolol\par -On Eliquis\par \par Hypothyroidism:\par -on synthroid 112mcg daily\par \par Depression:\par -On lexapro 10mg.\par -Doing well.\par \par Right facial numbness> TIA\par -Seen by neurology , Dr Ng.\par -CT scan normal.\par -Normal Doppler\par \par Hx Shingles with Neuropathy:\par -Use Lidoderm patch prn.\par \par

## 2020-01-02 NOTE — HISTORY OF PRESENT ILLNESS
[FreeTextEntry1] : Meds refills\par Weight loss unintentional [de-identified] : 68 y/o F with hx Breast cancer in remission, 2004 left mastectomy and chemotherapy, COPD, GERd, A.Fib on Eliquis..\par \par Persistent facial numbness and drooping for several months. Seen by neurology. CT scan/ Doppler: normal.\par \par Pt under works comp case. Seen by neurosx Dr Rusty Ponce in [place]. \par S/P neuro stimulator removal.\par States has difficulty eating due to dental prothesis, and pain.\par Lost aprox 40 lbs since 04/19.\par Poor appetite. States has changes in BM.\par

## 2020-01-02 NOTE — HEALTH RISK ASSESSMENT
[Intercurrent ED visits] : went to ED [No falls in past year] : Patient reported no falls in the past year [No] : In the past 12 months have you used drugs other than those required for medical reasons? No [de-identified] : 10/13/19 [facility] [] : No

## 2020-01-16 NOTE — PHYSICAL EXAM
[General Appearance - Well Nourished] : well nourished [General Appearance - Well Developed] : well developed [Normal Conjunctiva] : the conjunctiva exhibited no abnormalities [Neck Cervical Mass (___cm)] : no neck mass was observed [Neck Appearance] : the appearance of the neck was normal [Jugular Venous Distention Increased] : there was no jugular-venous distention [Heart Rate And Rhythm] : heart rate and rhythm were normal [Heart Sounds] : normal S1 and S2 [Edema] : no peripheral edema present [Arterial Pulses Normal] : the arterial pulses were normal [Systolic grade ___/6] : A grade [unfilled]/6 systolic murmur was heard. [] : no respiratory distress [Respiration, Rhythm And Depth] : normal respiratory rhythm and effort [Auscultation Breath Sounds / Voice Sounds] : lungs were clear to auscultation bilaterally [Exaggerated Use Of Accessory Muscles For Inspiration] : no accessory muscle use [Abnormal Walk] : normal gait [Abdomen Soft] : soft [Skin Turgor] : normal skin turgor [No Focal Deficits] : no focal deficits [Oriented To Time, Place, And Person] : oriented to person, place, and time [Normal Oral Mucosa] : normal oral mucosa [Normal Oropharynx] : normal oropharynx [Nail Clubbing] : no clubbing of the fingernails [Cyanosis, Localized] : no localized cyanosis

## 2020-01-16 NOTE — REVIEW OF SYSTEMS
[Recent Wt Loss (___ Lbs)] : recent [unfilled] ~Ulb weight loss [Pleuritic Pain] : no pleuritic pain [As Noted in HPI] : as noted in HPI [Reflux] : reflux [Negative] : Sleep Disorder

## 2020-02-04 PROBLEM — K22.70 BARRETT'S ESOPHAGUS WITHOUT DYSPLASIA: Status: ACTIVE | Noted: 2017-09-12

## 2020-02-04 PROBLEM — R63.4 WEIGHT LOSS, UNINTENTIONAL: Status: ACTIVE | Noted: 2020-01-01

## 2020-02-04 NOTE — HISTORY OF PRESENT ILLNESS
[de-identified] : This is a 69 year old woman with Breast cancer s/p mastectomy and chemotherapy, GERD, Afib on Eliquis who presents with weight loss. She had  CT abdomen and pelvis which was unremarkable except for mild biliary dilation. She has lost 40 lbs.  She was scheduled for an EGD and colonoscopy but developed new onset afib and was sent for cardiology evaluation.  She has not followed up till now. She denies constipation and rectal bleeding. She had a colonoscopy 8 years ago in [place]. She had an EGD 2 years ago with Dr. Grove.

## 2020-02-04 NOTE — PHYSICAL EXAM
[General Appearance - In No Acute Distress] : in no acute distress [General Appearance - Alert] : alert [Sclera] : the sclera and conjunctiva were normal [PERRL With Normal Accommodation] : pupils were equal in size, round, and reactive to light [Extraocular Movements] : extraocular movements were intact [Outer Ear] : the ears and nose were normal in appearance [Neck Appearance] : the appearance of the neck was normal [Oropharynx] : the oropharynx was normal [Neck Cervical Mass (___cm)] : no neck mass was observed [Thyroid Nodule] : there were no palpable thyroid nodules [Thyroid Diffuse Enlargement] : the thyroid was not enlarged [Jugular Venous Distention Increased] : there was no jugular-venous distention [Auscultation Breath Sounds / Voice Sounds] : lungs were clear to auscultation bilaterally [Heart Sounds Gallop] : no gallops [Heart Sounds] : normal S1 and S2 [Heart Rate And Rhythm] : heart rate was normal and rhythm regular [Heart Sounds Pericardial Friction Rub] : no pericardial rub [Bowel Sounds] : normal bowel sounds [Murmurs] : no murmurs [Abdomen Soft] : soft [Abdomen Tenderness] : non-tender [No Spinal Tenderness] : no spinal tenderness [No CVA Tenderness] : no ~M costovertebral angle tenderness [Abdomen Mass (___ Cm)] : no abdominal mass palpated [Skin Color & Pigmentation] : normal skin color and pigmentation [Skin Turgor] : normal skin turgor [] : no rash [Oriented To Time, Place, And Person] : oriented to person, place, and time [Affect] : the affect was normal [Impaired Insight] : insight and judgment were intact

## 2020-02-04 NOTE — ASSESSMENT
[FreeTextEntry1] : 69 year old woman with weight loss. She has a poor appetite and poor dentition but no other GI symptoms. I will schedule her for an egd and colonoscopy.

## 2020-03-12 PROBLEM — M81.0 OSTEOPOROSIS: Status: ACTIVE | Noted: 2019-07-03

## 2020-03-12 NOTE — ASSESSMENT
[FreeTextEntry1] : 68 y/o F presents today for f/up \par \par Acute sciatic pain in left LE:\par -Increase Gabapentin 400mg tid.\par -Percocet bid prn.\par -Pain Management referral.\par -refused PT referral.\par \par Unintentional weight loss/ BM changes:\par -Pending Colonoscopy> FOBT: negative 01/20\par -Blood test today\par \par COPD/ Hx Pulmonary Nodules?> Opacities in RTLL and LeftLL\par -F/up with Pulminology, Dr Carter\par -Last CT LDCT 02/20/18\par \par Neck pain \par -Pt under works comp case.\par -Seen by Neurosx, Dr Rusty Ponce in [facility].\par -S/P Neurostimulator\par -On Neurontin 300mg tid.\par \par Immunizations:\par -Flu and Prevnar : up to date\par -Shingrix : on hold\par \par HCM:\par -6/20/19\par \par Mammo: 2019\par \par Gyn: 2019\par \par Colonoscopy: needs to reschedule\par \par Hx Breast Cancer in remission;\par -f/up w/ oncology Dr Grady.\par \par  Osteoporosis:\par -On Ibandronate mthly.\par \par A.Fib:\par -Cardiology f/up: Dr Aragon/ [facility]\par -on flecainide and metoprolol\par -On Eliquis\par \par Hypothyroidism:\par -on synthroid 112mcg daily\par \par Depression:\par -On lexapro 10mg.\par -Doing well.\par \par Right facial numbness> TIA\par -Seen by neurology , Dr Ng.\par -CT scan normal.\par -Normal Doppler\par \par Hx Shingles with Neuropathy:\par -Use Lidoderm patch prn.\par \par

## 2020-03-12 NOTE — REVIEW OF SYSTEMS
[Recent Change In Weight] : ~T recent weight change [Constipation] : constipation [Back Pain] : back pain [Postnasal Drip] : no postnasal drip

## 2020-03-12 NOTE — HISTORY OF PRESENT ILLNESS
[FreeTextEntry1] : Left leg pain [de-identified] : 68 y/o F with hx Breast cancer in remission, 2004 left mastectomy and chemotherapy, COPD, GERd, A.Fib on Eliquis..\par \par Persistent facial numbness and drooping for several months. Seen by neurology. CT scan/ Doppler: normal.\par \par Pt under works comp case. Seen by neurosx Dr Rusty Ponce in [place]. \par with neck neuro stimulator \par States has difficulty eating due to dental prothesis, and pain.\par Lost aprox 40 lbs since 04/19.\par Poor appetite. States has changes in BM.\par Seen by GI, and unable to have EGD and Colonoscopy due to N/V\par Reports new Left LE numbness for 2 weeks and pain like sharp,  sometimes wakes her up from sleep.\par

## 2020-03-12 NOTE — HEALTH RISK ASSESSMENT
[Intercurrent ED visits] : went to ED [No] : In the past 12 months have you used drugs other than those required for medical reasons? No [No falls in past year] : Patient reported no falls in the past year [] : No [de-identified] : 10/13/19 [facility]

## 2020-04-13 NOTE — REVIEW OF SYSTEMS
[Recent Change In Weight] : ~T recent weight change [Constipation] : constipation [Back Pain] : back pain [Skin Rash] : skin rash [Postnasal Drip] : no postnasal drip

## 2020-04-13 NOTE — PHYSICAL EXAM
[No Acute Distress] : no acute distress [Well Nourished] : well nourished [Well Developed] : well developed [Well-Appearing] : well-appearing [Normal Posterior Cervical Nodes] : no posterior cervical lymphadenopathy [Normal Anterior Cervical Nodes] : no anterior cervical lymphadenopathy [No Spinal Tenderness] : no spinal tenderness [Grossly Normal Strength/Tone] : grossly normal strength/tone [Skin Lesions 1] : Skin lesion: [Multiple] : multiple [___ Dermatomal Distribution] : in a [unfilled] dermatomal distribution [Normal Affect] : the affect was normal [Normal Insight/Judgement] : insight and judgment were intact [FreeTextEntry4] : dry

## 2020-04-13 NOTE — HEALTH RISK ASSESSMENT
[Intercurrent ED visits] : went to ED [No] : In the past 12 months have you used drugs other than those required for medical reasons? No [No falls in past year] : Patient reported no falls in the past year [] : No [de-identified] : 10/13/19 [facility]

## 2020-04-13 NOTE — ASSESSMENT
[FreeTextEntry1] : 70 y/o F evaluated by telemedicine \par \par Acute sciatic pain in left LE:\par -On Gabapentin 400mg tid.\par -Percocet bid prn. refill\par -Seen by Pain Management> Dr Chahal?\par -refused PT referral.\par \par Unintentional weight loss/ BM changes:\par -Pending Colonoscopy> FOBT: negative 01/20\par \par COPD/ Hx Pulmonary Nodules?> Opacities in RTLL and LeftLL\par -F/up with Pulminology, Dr Carter\par -Last CT LDCT 02/20/18\par \par Neck pain \par -Pt under works comp case.\par -Seen by Neurosx, Dr Rusty Ponce in [facility].\par -S/P Neurostimulator\par -On Neurontin 300mg tid.\par \par Immunizations:\par -Flu and Prevnar : up to date\par -Shingrix : on hold\par \par HCM:\par -6/20/19\par \par Mammo: 2019\par \par Gyn: 2019\par \par Colonoscopy: needs to reschedule\par \par Hx Breast Cancer in remission;\par -f/up w/ oncology Dr Grady.\par \par  Osteoporosis:\par -On Ibandronate mthly.\par \par A.Fib:\par -Cardiology f/up: Dr Aragon/ [place]\par -on flecainide and metoprolol\par -On Eliquis\par \par Hypothyroidism:\par -on synthroid 112mcg daily\par -TFT to repeat\par \par Depression:\par -On lexapro 10mg.\par -Doing well.\par \par Right facial numbness> TIA\par -Seen by neurology , Dr Ng.\par -CT scan normal.\par -Normal Doppler\par \par Hx Shingles with Neuropathy:\par -Use Lidoderm patch prn.\par \par

## 2020-04-13 NOTE — HISTORY OF PRESENT ILLNESS
[Home] : at home, [unfilled] , at the time of the visit. [Other Location: e.g. Home (Enter Location, City,State)___] : at [unfilled] [Patient] : the patient [FreeTextEntry1] : pain in Lower extremity. Meds refills. [de-identified] : 70 y/o F with hx Breast cancer in remission, 2004 left mastectomy and chemotherapy, COPD, GERd, A.Fib on Eliquis..\par \par Persistent facial numbness and drooping for several months. Seen by neurology. CT scan/ Doppler: normal.\par \par Pt under works comp case. Seen by neurosx Dr Rusty Ponce in [place]. \par with neck neuro stimulator \par States has difficulty eating due to numbness in face.\par Lost aprox 40 lbs since 04/19.\par Poor appetite. States has changes in BM.\par Seen by GI, and unable to have EGD and Colonoscopy due to N/V. FOBT: negative\par Reports  Left LE numbness for > 1 month.,  sometimes wakes her up from sleep. States pain med help with pain.\par Seen by Pain Management, Dr Chahal.\par

## 2020-04-27 PROBLEM — R21 SKIN RASH: Status: ACTIVE | Noted: 2020-01-01

## 2020-04-27 PROBLEM — B86 SCABIES: Status: ACTIVE | Noted: 2020-01-01

## 2020-04-27 NOTE — PHYSICAL EXAM
[No Acute Distress] : no acute distress [Well Developed] : well developed [Well Nourished] : well nourished [Normal] : no acute distress, well nourished, well developed and well-appearing [Well-Appearing] : well-appearing [Skin Lesions 1] : Skin lesion: [Multiple Crusts] : multiple [Brown] : brown [Location: ___] : [unfilled] [Chest] : on the chest [Abdomen] : on the abdomen [Lesions Arms Both] : on both arms

## 2020-04-27 NOTE — ASSESSMENT
[FreeTextEntry1] : 71 y/o F evaluated by telemedicine \par \par Skin rash> Scabies? Bed Bugs?\par -Start Permethirine lotion.\par -F/up in 1 week.\par \par  sciatic pain in left LE:\par -On Gabapentin 400mg tid.\par -Percocet bid prn. refill\par -Seen by Pain Management> Dr Chahal> pending Lumbar CT\par -refused PT referral.\par \par Unintentional weight loss/ BM changes:\par -Pending Colonoscopy> FOBT: negative 01/20\par -Oncology evaluation advise (Hx Breast Cancer in remission)\par \par COPD/ Hx Pulmonary Nodules?> Opacities in RTLL and LeftLL\par -F/up with Pulminology, Dr Carter\par -Last CT LDCT 02/20/18\par \par Neck pain \par -Pt under works comp case.\par -Seen by Neurosx, Dr uRsty Ponce in [facility].\par -S/P Neurostimulator\par -On Neurontin 300mg tid.\par \par Immunizations:\par -Flu and Prevnar : up to date\par -Shingrix : on hold\par \par HCM:\par -6/20/19\par \par Mammo: 2019\par \par Gyn: 2019\par \par Colonoscopy: needs to reschedule\par \par Hx Breast Cancer in remission;\par -f/up w/ oncology Dr Grady.\par \par  Osteoporosis:\par -On Ibandronate mthly.\par \par A.Fib:\par -Cardiology f/up: Dr Aragon/ [place]\par -on flecainide and metoprolol\par -On Eliquis\par \par Hypothyroidism:\par -on synthroid 112mcg daily\par -TFT : 03/20\par \par Depression:\par -On lexapro 10mg.\par -Doing well.\par \par Right facial numbness> TIA\par -Seen by neurology , Dr Ng.\par -CT scan normal.\par -Normal Doppler\par \par Hx Shingles with Neuropathy:\par -Use Lidoderm patch prn.\par \par  \par \par  \par

## 2020-04-27 NOTE — HISTORY OF PRESENT ILLNESS
[Home] : at home, [unfilled] , at the time of the visit. [Medical Office: ([facility])___] : at the medical office located in  [Patient] : the patient [FreeTextEntry1] : skin rash\par meds refills [de-identified] : 71 y/o F with hx Breast cancer in remission, 2004 left mastectomy and chemotherapy, COPD, GERd, A.Fib on Eliquis..\par \par Persistent facial numbness and drooping for several months. Seen by neurology. CT scan/ Doppler: normal.\par \par Pt under works comp case. Seen by neurosx Dr Rusty Ponce in [place]. \par with neck neuro stimulator \par States has difficulty eating due to numbness in face.\par Lost aprox 40 lbs since 04/19.\par Poor appetite. States has changes in BM.\par Seen by GI, and was unable to have EGD and Colonoscopy due to N/V. FOBT: negative\par Reports Left LE numbness for > 1 month., sometimes wakes her up from sleep. States pain med help with pain.\par Seen by Pain Management, Dr Chahal.\par Pt today states has a skin rash for the last 3 days aprox, was itchy initially, in abdomen, upper and lower extremities.\par

## 2020-05-11 PROBLEM — M54.32 LEFT SIDED SCIATICA: Status: ACTIVE | Noted: 2020-01-01

## 2020-05-11 NOTE — PHYSICAL EXAM
[Well Nourished] : well nourished [No Acute Distress] : no acute distress [Well Developed] : well developed [Well-Appearing] : well-appearing [Skin Lesions 1] : Skin lesion: [Multiple Crusts] : multiple [Normal] : no acute distress, well nourished, well developed and well-appearing [Location: ___] : [unfilled] [Brown] : brown [Chest] : on the chest [Abdomen] : on the abdomen [Lesions Arms Both] : on both arms

## 2020-05-11 NOTE — HISTORY OF PRESENT ILLNESS
[Home] : at home, [unfilled] , at the time of the visit. [Medical Office: ([facility])___] : at the medical office located in  [Patient] : the patient [FreeTextEntry1] : skin rash\par meds refills [de-identified] : 69 y/o F with hx Breast cancer in remission, 2004 left mastectomy and chemotherapy, COPD, GERd, A.Fib on Eliquis..\par \par Persistent facial numbness and drooping for several months. Seen by neurology. CT scan/ Doppler: normal.\par \par Pt under works comp case. Seen by neurosx Dr Rusty Ponce in [place]. \par with neck neuro stimulator \par States has difficulty eating due to numbness in face.\par Lost aprox 40 lbs since 04/19.\par Poor appetite. States has changes in BM.\par Seen by GI, and was unable to have EGD and Colonoscopy due to N/V. FOBT: negative\par Reports Left LE numbness for > 1 month., sometimes wakes her up from sleep. States pain med help with pain.\par Seen by Pain Management, Dr Chahal.\par Reports skin rash resolved.\par

## 2020-05-12 PROBLEM — R20.2 PARESTHESIA: Status: ACTIVE | Noted: 2019-07-31

## 2020-05-12 PROBLEM — R20.2 FACIAL PARESTHESIA: Status: ACTIVE | Noted: 2019-08-15

## 2020-06-04 PROBLEM — R52 BODY ACHES: Status: ACTIVE | Noted: 2020-01-01

## 2020-06-04 PROBLEM — Z20.828 SUSPECTED COVID-19 VIRUS INFECTION: Status: ACTIVE | Noted: 2020-01-01

## 2020-06-04 PROBLEM — R05 PERSISTENT COUGH: Status: ACTIVE | Noted: 2020-01-01

## 2020-06-04 NOTE — HISTORY OF PRESENT ILLNESS
[Home] : at home, [unfilled] , at the time of the visit. [Medical Office: ([facility])___] : at the medical office located in  [Verbal consent obtained from patient] : the patient, [unfilled] [FreeTextEntry8] : Pt called requesting Telehealth visit secondary to complains of cough and sore throat. Pt states that 2 weeks ago she had episodes of vomiting, was recommended to visit [facility], she states that went to [facility] office and was tested for Covid which was negative on 5/25. Pt states that now she developed cough productive with clear sputum, sore throat, hoarseness, mild loss of taste sensation, body aches tough states that the body aches are normal for her, denies fever or known Covid 19 + contacts. Pt has taken OTC cough syrup with no real relief.

## 2020-06-04 NOTE — ASSESSMENT
[FreeTextEntry1] : Symptoms most likely due to seasonal allergies, however DDx would include Covid-19 though low suspicion, I have recommended her to be re-tested as her initial  symptoms when swabbed were not similar to those she presents today. I will send Rx for Promethazine-DM cough syrup and Levocetirizine tabs to take daily. I have asked pt to call us again in the event that she develops any fever or worsening symptoms including SOB/CP.\par Pt will continue to follow up with PMD Dr Doreen Cruz.

## 2020-06-04 NOTE — REVIEW OF SYSTEMS
[Hoarseness] : hoarseness [Sore Throat] : sore throat [Postnasal Drip] : postnasal drip [Cough] : cough [Joint Pain] : joint pain [Negative] : Cardiovascular [Fever] : no fever [Chills] : no chills [Fatigue] : no fatigue [Shortness Of Breath] : no shortness of breath [Wheezing] : no wheezing

## 2020-06-04 NOTE — PHYSICAL EXAM
[Normal] : no acute distress, well nourished, well developed and well-appearing [No Focal Deficits] : no focal deficits [Normal Affect] : the affect was normal [Normal Insight/Judgement] : insight and judgment were intact [de-identified] : Poor dentition

## 2020-06-11 PROBLEM — Z87.891 FORMER CIGARETTE SMOKER: Status: ACTIVE | Noted: 2020-01-01

## 2020-06-11 NOTE — DATA REVIEWED
[Lung Cancer Screening] : Patient underwent lung cancer screening [2] : 2 [S] : S [3] : 3 [TextBox_12] : 01/16 [TextBox_27] : 02/18

## 2020-06-11 NOTE — REASON FOR VISIT
[Home] : at home, [unfilled] , at the time of the visit. [Other Location: e.g. Home (Enter Location, City,State)___] : at [unfilled] [Verbal consent obtained from patient] : the patient, [unfilled] [Annual Follow-Up] : an annual follow-up visit [Low-Dose CT Screening Discussion] : low-dose CT lung cancer screening discussion [Review of Eligibility] : review of eligibility

## 2020-06-11 NOTE — PLAN
[FreeTextEntry1] : - Low Dose CT chest for lung cancer screening.\par \par - Encouraged continued smoking abstinence\par \par \par Engaged in share decision making with MsGutierrez ENRRIQUE.  Answered all questions.  She verbalized understanding and agreement.  She knows to call back with any questions or concerns.\par

## 2020-06-11 NOTE — HISTORY OF PRESENT ILLNESS
[TextBox_13] : Referred by Dr. Doreen Cruz.\par \par Ms. OSUNA is a 70 year old female with a history of afib s/p ablation, TIA, aortic valve disease, COPD and left breast cancer s/p left mastectomy and chemo ().\par \par She was called today to review eligibility for Low-Dose CT lung cancer screening.  Reviewed and confirmed that the patient meets screening eligibility criteria:\par \par 70 years old \par \par Smoking Status: Former smoker\par \par Number of pack(s) per day: 2\par Number of years smoked: 45\par Number of pack years smokin\par \par Number of years since quitting smokin\par Quit year: \par \par Ms. OSUNA denies any symptoms of lung cancer, including new cough, change in cough, hemoptysis, and unintentional weight loss.\par \par Ms. OSUNA denies any personal history of lung cancer.  Admits to lung cancer in two first degree relatives, her brothers.  Denies  any history of occupational exposures.

## 2020-07-04 NOTE — PROVIDER CONTACT NOTE (EICU) - RECOMMENDATIONS
keppra load with maintenance keppra to follow  -neurology consult  -EEG  - with leukocytosis and new onset seizures will cover empirically for meningitis; vanco, ceftriaxone, ampicillin, acyclovir  - check blood and u cx  - check toxicology screen  - d/w PA

## 2020-07-04 NOTE — ED ADULT NURSE NOTE - OBJECTIVE STATEMENT
Report received from Jocelynn Mcdonough RN Care of pt assumed @1840 in critical care, pt escorted to ct scan, and returned to CC placed back on R2 pads and monitor per protocol. .( per md's note; who was there initially for assessment , Patient presents to the ED for reported shaking.  Initially stated by triage and EMS that it was due to a malfunctioning spinal stimulator.  However, patient with apparent partial seizure with LUE tremor.  Sx marched across to right side with tonic clonic episode lasting ~1 minute and resolving on its own.  Per patient and chart, PMH of HTN, HLD, SVT, AS, osteoporosis.  Spinal stimulator handled by Dr. Ponce.  After intial seizure, patient with return of LUE tremor and keppra and ativan given.  Non toxic.  No aggravating or relieving factors. No other pertinent PMH.  No other pertinent PSH.  No other pertinent FHx.  Patient denies EtOH/tobacco/illicit substance use. No fever/chills,      s'

## 2020-07-04 NOTE — H&P ADULT - PROBLEM SELECTOR PLAN 2
Suspect post-ictal state + IV sedation. Start EEG monitoring. Careful airway monitoring given high risk for aspiration and respiratory failure. Start end-tidal CO2 monitoring. Keep NPO.

## 2020-07-04 NOTE — ED PROVIDER NOTE - CLINICAL SUMMARY MEDICAL DECISION MAKING FREE TEXT BOX
Patient presents with seizures.  CT scan demonstrates no acute pathology. LAbs with elevated WBC. Patient reportedly on eliquis.  afebriel in ED.  d/w Dr. Dominique and ICU. Patient signed out to incoming physician.  All decisions regarding the progression of care will be made at their discretion. Patient presents with seizures.  CT scan demonstrates no acute pathology. LAbs with elevated WBC. Patient reportedly on eliquis.  afebriel in ED.  d/w Dr. Dominique and ICU. abx ordered.  BCx ordered.  d/w ICU and marquez bowling.

## 2020-07-04 NOTE — H&P ADULT - PROBLEM SELECTOR PLAN 1
Etiology not entirely clear at this time. Benzo withdrawal? Spinal stimulator malfunction? No acute infarct, hemorrhage, or mass seen on CT head. Cannot exclude relation to meningitis given elevated WBC with left shift and borderline fever (99.7'F), although no evidence of meningismus.   Loaded with 2g Keppra, will start maintenance dosing. Will use IV ativan to break further seizure activity. Start EEG monitoring to r/o subclinical seizures. Will cover empirically for meningitis (ceftriaxone/vancomycin/ampicillin/acyclovir). Defer LP for now as she is on apixaban, will hold AM dose so procedure can be performed. May require MRI to r/o brain mass in the setting of recently diagnosed lung nodule. Etiology not entirely clear at this time. Benzo withdrawal? Spinal stimulator malfunction? No acute infarct, hemorrhage, or mass seen on CT head. Cannot exclude relation to meningitis given elevated WBC with left shift and borderline fever (99.7'F), although no evidence of meningismus.   Loaded with 2g Keppra, will start maintenance dosing. Will use IV ativan to break further seizure activity. Start EEG monitoring to r/o subclinical seizures. Will cover empirically for meningitis (ceftriaxone/vancomycin/ampicillin/acyclovir). Defer LP for now as she is on apixaban, will hold AM dose so procedure can be performed. May require MRI to r/o brain mass in the setting of recently diagnosed lung nodule. Neurology service is already on board.

## 2020-07-04 NOTE — ED PROVIDER NOTE - PHYSICAL EXAMINATION
Gen: Alert, NAD  Head: NC, AT, PERRL, EOMI, normal lids/conjunctiva  ENT: normal hearing, patent oropharynx without erythema/exudate, uvula midline  Neck: +supple, no tenderness/meningismus/JVD, +Trachea midline  Pulm: Bilateral BS, normal resp effort, no wheeze/stridor/retractions  CV: RRR, no M/R/G, +dist pulses  Abd: soft, NT/ND, +BS, no hepatosplenomegaly  Mskel: no edema/erythema/cyanosis  Skin: no rash  Neuro: AAOx3, no gross sensory/motor deficits (when not having seizure-like activity) Gen: Alert, NAD  Head: NC, AT, PERRL, EOMI, normal lids/conjunctiva  ENT: normal hearing, patent oropharynx without erythema/exudate, uvula midline  Neck: +supple, no tenderness/meningismus/JVD, +Trachea midline  Pulm: Bilateral BS, normal resp effort, no wheeze/stridor/retractions  CV: RRR, no M/R/G, +dist pulses  Abd: soft, NT/ND, +BS, no hepatosplenomegaly  Mskel: no edema/erythema/cyanosis  Skin: no rash  Neuro: AAOx3, no gross sensory/motor deficits (when not having seizure-like activity), post seizure and medication, patient is responsive to voice

## 2020-07-04 NOTE — H&P ADULT - NSICDXPASTMEDICALHX_GEN_ALL_CORE_FT
PAST MEDICAL HISTORY:  Aortic stenosis     Atrial fibrillation     Breast CA     HLD (hyperlipidemia)     HTN (hypertension)     Lung nodule     OA (osteoarthritis)     S/P insertion of spinal cord stimulator

## 2020-07-04 NOTE — H&P ADULT - MENTAL STATUS
awake, opens eyes and tracks, nonverbal, not following commands, twitching/tremor/rhythmic muscle contractions of LUE, moving all other extremities but minimally

## 2020-07-04 NOTE — H&P ADULT - ASSESSMENT
69 y/o F with a h/o HTN, HLD, AF (on apixaban), AS, breast CA (in remission), left lung nodule, spinal stimulator for pain, with status epilepticus, encephalopathy, leukocytosis, r/o meningitis.    Case discussed in detail with eICU attending, Dr. Mas.        CRITICAL CARE TIME SPENT: 48 mins  Time spent evaluating/treating patient with medical issues that pose a high risk for life threatening deterioration and/or end-organ damage, reviewing data/labs/imaging, discussing case with multidisciplinary team, discussing plan/goals of care with patient/family. Non-inclusive of procedure time. 71 y/o F with a h/o HTN, HLD, hypothyroid, AF (on apixaban), AS, breast CA (in remission), left lung nodule, spinal stimulator for pain, with status epilepticus, encephalopathy, leukocytosis, r/o meningitis.    Case discussed in detail with eICU attending, Dr. Mas.        CRITICAL CARE TIME SPENT: 48 mins  Time spent evaluating/treating patient with medical issues that pose a high risk for life threatening deterioration and/or end-organ damage, reviewing data/labs/imaging, discussing case with multidisciplinary team, discussing plan/goals of care with patient/family. Non-inclusive of procedure time.

## 2020-07-04 NOTE — ED ADULT NURSE REASSESSMENT NOTE - NS ED NURSE REASSESS COMMENT FT1
This RN witnessed verbal consent by Dr. Calderon who spoke to Demian Vogel ( pt's son ) via telephone, regarding the need for a possible lumbar puncture. Pts son agreed to procedure if needed and verbalized understanding of the risks associated with the procedure.
Report received from off-going RN at 19:30, VSS, pt resting comfortably in stretcher on cardiac monitor/. Nonrebreather O2 in use at 10 LPM, tolerating well. Patient continues to be lethargic and is minimally verbal, able to state her name but otherwise slurred speech at this time. Awaiting dispo.

## 2020-07-04 NOTE — PROVIDER CONTACT NOTE (EICU) - BACKGROUND
70F PMH HTN, HLD, SVT s/p ablation, a-fib on DOAC, aortic stenosis, osteoporosis, hypothyroidism, COPD, breast CA (in remission), spinal stimulator, anxiety, hx of substance abuse (per son) presents for tremors and twitching of LUE. Pt in ED with complex partial seizure that started on the left side and progressed to right sided tremors follow by tonic clonic/grand mal seizure. Ativan given. Labs with leukocytosis.

## 2020-07-04 NOTE — H&P ADULT - HISTORY OF PRESENT ILLNESS
69 y/o F with a h/o HTN, HLD, AF (on apixaban), AS, breast CA (in remission), left lung nodule, spinal stimulator for pain, presents to the ED with LUE twitching/tremor and complaints of "spinal stimulator malfunction". As per ED physician there was concern for partial seizure activity and then the twitching/tremulousness began in the RUE as well and she subsequently developed tonic-clonic seizure activity lasting ~ 1 minute. She received 2mg IV lorazepam. Now with significant encephalopathy and continued twitching/rhythmic muscle contractions of LUE. She is nonverbal and not following commands. Also noted to have a borderline fever (99.7'F) and a WBC of 19K with left shift. CT head neg for acute pathology. Of note, her son reported that she recently stopped taking a medication for anxiety and does have a history of substance abuse although was not specific and denied EtOH abuse. 69 y/o F with a h/o HTN, HLD, AF (on apixaban), AS, breast CA (in remission), left lung nodule, spinal stimulator for pain, presents to the ED with LUE twitching/tremor and complaints of "spinal stimulator malfunction". As per ED physician there was concern for partial seizure activity and then the twitching/tremulousness began in the RUE as well and she subsequently developed tonic-clonic seizure activity lasting ~ 1 minute. She received 2mg IV lorazepam. Now with significant encephalopathy and continued twitching/rhythmic muscle contractions of LUE. She is nonverbal and not following commands. Also noted to have a borderline fever (99.7'F) and a WBC of 19K with left shift. CT head neg for acute pathology. Of note, her son reported that she recently stopped taking a medication for anxiety and does have a history of substance abuse although was not specific but did deny EtOH abuse. He also reported that she has had a 50lb unintentional weight loss over the past year and has experienced "chela and episodes of drooling". 69 y/o F with a h/o HTN, HLD, hypothyroid, AF (on apixaban), AS, breast CA (in remission), left lung nodule, spinal stimulator for pain, presents to the ED with LUE twitching/tremor and complaints of "spinal stimulator malfunction". As per ED physician there was concern for partial seizure activity and then the twitching/tremulousness began in the RUE as well and she subsequently developed tonic-clonic seizure activity lasting ~ 1 minute. She received 2mg IV lorazepam. Now with significant encephalopathy and continued twitching/rhythmic muscle contractions of LUE. She is nonverbal and not following commands. Also noted to have a borderline fever (99.7'F) and a WBC of 19K with left shift. CT head neg for acute pathology. Of note, her son reported that she recently stopped taking a medication for anxiety and does have a history of substance abuse although was not specific but did deny EtOH abuse. He also reported that she has had a 50lb unintentional weight loss over the past year and has experienced "chela and episodes of drooling".

## 2020-07-04 NOTE — ED PROVIDER NOTE - PROGRESS NOTE DETAILS
spoke with son who states no known seizure DO.  Patient does have a substance abuse hx and recently "stopped one of her anxiety meds."  Patient has had longstanding left facial paralysis and has lost ~50lbs in the past year.  States she has been "acting manic at times and sometimes even drooling."  Son does given verbal consent to LP with Jocelynn Uriostegui and witness. d/w Dr. Dominique.  will given additional keppra.  patient stable.  responding to voice.  afebrile at 99.7.  VSS. spoke with son who states no known seizure DO.  Patient does have a substance abuse hx and recently "stopped one of her anxiety meds."  Patient has had longstanding left facial paralysis and has lost ~50lbs in the past year.  States she has been "acting manic at times and sometimes even drooling."  Son does given verbal consent to LP with Jocelynn Uriostegui as witness. Per Son, HEALTHCARE INFORMATION can be shared with PHIL OSUNA .  Son remains HCP at this time. ICU consult placed.

## 2020-07-04 NOTE — ED ADULT TRIAGE NOTE - CHIEF COMPLAINT QUOTE
Family called because patient is twitching.  Per pt her neuro stimulator in neck is malfunctioning and will not shut off despite turning the remote off.  Patient is rhythmically twitching, without pain or distress.

## 2020-07-04 NOTE — ED PROVIDER NOTE - OBJECTIVE STATEMENT
Pertinent PMH/PSH/FHx/SHx and Review of Systems contained within:  Patient presents to the ED for reported shaking.  INitially stated by triage and EMS that it was due to a malfunctioning spinal stimulator.  However, patient with apparent partial seizure with LUE tremor.  Sx marched across to right side with tonic clonic episode lasting ~1 minute and resolving on its own.  Per patient and chart, PMH of HTN, HLD, SVT, AS, osteoporosis.  Spinal stimulator handled by Dr. Ponce.  After intial seizure, patient with return of LUE tremor and keppra and ativan given.  Non toxic.  No aggravating or relieving factors. No other pertinent PMH.  No other pertinent PSH.  No other pertinent FHx.  Patient denies EtOH/tobacco/illicit substance use. No fever/chills, No photophobia/eye pain/changes in vision, No ear pain/sore throat/dysphagia, No chest pain/palpitations, no SOB/cough/wheeze/stridor, No abdominal pain, No N/V/D, no dysuria/frequency/discharge, No neck/back pain, no rash,

## 2020-07-05 NOTE — PROGRESS NOTE ADULT - SUBJECTIVE AND OBJECTIVE BOX
Patient is a 70y old  Female who presents with a chief complaint of Status epilepticus (2020 20:54)      BRIEF HOSPITAL COURSE: ***    Events last 24 hours: ***    PAST MEDICAL & SURGICAL HISTORY:  S/P insertion of spinal cord stimulator  Lung nodule  Aortic stenosis  Breast CA  OA (osteoarthritis)  Atrial fibrillation  HLD (hyperlipidemia)  HTN (hypertension)  H/O left mastectomy      Review of Systems:  CONSTITUTIONAL: No fever, chills, or fatigue  EYES: No eye pain, visual disturbances, or discharge  ENMT:  No difficulty hearing, tinnitus, vertigo; No sinus or throat pain  NECK: No pain or stiffness  RESPIRATORY: No cough, wheezing, chills or hemoptysis; No shortness of breath  CARDIOVASCULAR: No chest pain, palpitations, dizziness, or leg swelling  GASTROINTESTINAL: No abdominal or epigastric pain. No nausea, vomiting, or hematemesis; No diarrhea or constipation. No melena or hematochezia.  GENITOURINARY: No dysuria, frequency, hematuria, or incontinence  NEUROLOGICAL: No headaches, memory loss, loss of strength, numbness, or tremors  SKIN: No itching, burning, rashes, or lesions   MUSCULOSKELETAL: No joint pain or swelling; No muscle, back, or extremity pain  PSYCHIATRIC: No depression, anxiety, mood swings, or difficulty sleeping      Medications:        levETIRAcetam  IVPB 500 milliGRAM(s) IV Intermittent every 12 hours  LORazepam   Injectable 2 milliGRAM(s) IV Push every 5 minutes PRN      aspirin  chewable 81 milliGRAM(s) Oral daily        atorvastatin 10 milliGRAM(s) Oral at bedtime  levothyroxine 112 MICROGram(s) Oral daily        lidocaine   Patch 1 Patch Transdermal every 24 hours            ICU Vital Signs Last 24 Hrs  T(C): 36.4 (2020 08:07), Max: 37.6 (2020 19:39)  T(F): 97.6 (2020 08:07), Max: 99.7 (2020 19:39)  HR: 106 (2020 08:00) (79 - 116)  BP: 101/63 (2020 08:00) (101/63 - 169/76)  BP(mean): 75 (2020 08:00) (75 - 110)  ABP: --  ABP(mean): --  RR: 36 (2020 08:00) (10 - 36)  SpO2: 100% (2020 08:00) (96% - 100%)          I&O's Detail    2020 07:01  -  2020 07:00  --------------------------------------------------------  IN:    Solution: 250 mL    Solution: 200 mL  Total IN: 450 mL    OUT:    Indwelling Catheter - Urethral: 1280 mL  Total OUT: 1280 mL    Total NET: -830 mL      2020 07:01  -  2020 10:36  --------------------------------------------------------  IN:  Total IN: 0 mL    OUT:    Indwelling Catheter - Urethral: 840 mL  Total OUT: 840 mL    Total NET: -840 mL            LABS:                        11.2   10.47 )-----------( 326      ( 2020 06:21 )             35.2     07-05    136  |  99  |  10.0  ----------------------------<  125<H>  4.2   |  23.0  |  0.68    Ca    8.9      2020 04:58  Phos  2.5     07-05  Mg     1.8     07-05    TPro  8.3  /  Alb  4.4  /  TBili  0.2<L>  /  DBili  x   /  AST  28  /  ALT  17  /  AlkPhos  74  07-04      CARDIAC MARKERS ( 2020 19:14 )  x     / 0.17 ng/mL / x     / x     / x          CAPILLARY BLOOD GLUCOSE      POCT Blood Glucose.: 114 mg/dL (2020 18:26)    PT/INR - ( 2020 06:21 )   PT: 14.2 sec;   INR: 1.24 ratio         PTT - ( 2020 19:14 )  PTT:37.2 sec  Urinalysis Basic - ( 2020 21:22 )    Color: Yellow / Appearance: Clear / S.015 / pH: x  Gluc: x / Ketone: Trace  / Bili: Negative / Urobili: Negative mg/dL   Blood: x / Protein: 30 mg/dL / Nitrite: Negative   Leuk Esterase: Small / RBC: Negative /HPF / WBC 0-2   Sq Epi: x / Non Sq Epi: Occasional / Bacteria: x      CULTURES:      Physical Examination:    General: No acute distress.  Alert, oriented, interactive, nonfocal    HEENT: Pupils equal, reactive to light.  Symmetric.    PULM: Clear to auscultation bilaterally, no significant sputum production    CVS: Regular rate and rhythm, no murmurs, rubs, or gallops    ABD: Soft, nondistended, nontender, normoactive bowel sounds, no masses    EXT: No edema, nontender    SKIN: Warm and well perfused, no rashes noted.    NEURO: A&Ox3, strength 5/5 all extremities, cranial nerves grossly intact, no focal deficits    RADIOLOGY: ***    CRITICAL CARE TIME SPENT: ***  Evaluating/treating patient, reviewing data/labs/imaging, discussing case with multidisciplinary team, discussing plan/goals of care with patient/family. Non-inclusive of procedure time. Patient is a 70y old  Female who presents with a chief complaint of Status epilepticus (2020 20:54)      BRIEF HOSPITAL COURSE:  69 y/o female pmhx HTN, HLD, hypothyroid, Afib on Eliquis, AS, breast cancer( in remission), left lung nodule, spinal stimulator presented to ED last night for LUE twitching/ tremor. Patient had RUE twitching/ Tremor subsequently developing tonic-clonic seizure  lasting about 1 minute.   Admitted to ICU for airway monitoring given post ictal state and encephelopathy. Son last night reported that she has had a 50lb unintentional weight loss over the past year and has experienced "chela and episodes of drooling".       Events last 24 hours: Overnight no further seizure activity Back to baseline mental status.  Abx d/c today.     PAST MEDICAL & SURGICAL HISTORY:  S/P insertion of spinal cord stimulator  Lung nodule  Aortic stenosis  Breast CA  OA (osteoarthritis)  Atrial fibrillation  HLD (hyperlipidemia)  HTN (hypertension)  H/O left mastectomy      Review of Systems:  CONSTITUTIONAL: No fever, chills, or fatigue  EYES: No eye pain, visual disturbances, or discharge  ENMT:  No difficulty hearing, tinnitus, vertigo; No sinus or throat pain  NECK: No pain or stiffness  RESPIRATORY: No cough, wheezing, chills or hemoptysis; No shortness of breath  CARDIOVASCULAR: No chest pain, palpitations, dizziness, or leg swelling  GASTROINTESTINAL: No abdominal or epigastric pain. No nausea, vomiting, or hematemesis; No diarrhea or constipation. No melena or hematochezia.  GENITOURINARY: No dysuria, frequency, hematuria, or incontinence  NEUROLOGICAL: No headaches, memory loss, loss of strength, numbness, or tremors  SKIN: No itching, burning, rashes, or lesions   MUSCULOSKELETAL: No joint pain or swelling; No muscle, back, or extremity pain  PSYCHIATRIC: No depression, anxiety, mood swings, or difficulty sleeping      Medications:        levETIRAcetam  IVPB 500 milliGRAM(s) IV Intermittent every 12 hours  LORazepam   Injectable 2 milliGRAM(s) IV Push every 5 minutes PRN  aspirin  chewable 81 milliGRAM(s) Oral daily  atorvastatin 10 milliGRAM(s) Oral at bedtime  levothyroxine 112 MICROGram(s) Oral daily  lidocaine   Patch 1 Patch Transdermal every 24 hours            ICU Vital Signs Last 24 Hrs  T(C): 36.4 (2020 08:07), Max: 37.6 (2020 19:39)  T(F): 97.6 (2020 08:07), Max: 99.7 (2020 19:39)  HR: 106 (2020 08:00) (79 - 116)  BP: 101/63 (2020 08:00) (101/63 - 169/76)  BP(mean): 75 (2020 08:00) (75 - 110)  RR: 36 (2020 08:00) (10 - 36)  SpO2: 100% (2020 08:00) (96% - 100%)          I&O's Detail    2020 07:01  -  2020 07:00  --------------------------------------------------------  IN:    Solution: 250 mL    Solution: 200 mL  Total IN: 450 mL    OUT:    Indwelling Catheter - Urethral: 1280 mL  Total OUT: 1280 mL    Total NET: -830 mL      2020 07:01  -  2020 10:36  --------------------------------------------------------  IN:  Total IN: 0 mL    OUT:    Indwelling Catheter - Urethral: 840 mL  Total OUT: 840 mL    Total NET: -840 mL            LABS:                        11.2   10.47 )-----------( 326      ( 2020 06:21 )             35.2     07-05    136  |  99  |  10.0  ----------------------------<  125<H>  4.2   |  23.0  |  0.68    Ca    8.9      2020 04:58  Phos  2.5     07-05  Mg     1.8     07-05    TPro  8.3  /  Alb  4.4  /  TBili  0.2<L>  /  DBili  x   /  AST  28  /  ALT  17  /  AlkPhos  74  07-04      CARDIAC MARKERS ( 2020 19:14 )  x     / 0.17 ng/mL / x     / x     / x          CAPILLARY BLOOD GLUCOSE      POCT Blood Glucose.: 114 mg/dL (2020 18:26)    PT/INR - ( 2020 06:21 )   PT: 14.2 sec;   INR: 1.24 ratio         PTT - ( 2020 19:14 )  PTT:37.2 sec  Urinalysis Basic - ( 2020 21:22 )    Color: Yellow / Appearance: Clear / S.015 / pH: x  Gluc: x / Ketone: Trace  / Bili: Negative / Urobili: Negative mg/dL   Blood: x / Protein: 30 mg/dL / Nitrite: Negative   Leuk Esterase: Small / RBC: Negative /HPF / WBC 0-2   Sq Epi: x / Non Sq Epi: Occasional / Bacteria: x      CULTURES:      Physical Examination:    General: No acute distress.  Alert, oriented, interactive, nonfocal    HEENT: Pupils equal, reactive to light.  Symmetric.    PULM: Clear to auscultation bilaterally, no significant sputum production    CVS: Regular rate and rhythm, no murmurs, rubs, or gallops    ABD: Soft, nondistended, nontender, normoactive bowel sounds, no masses    EXT: No edema, nontender    SKIN: Warm and well perfused, no rashes noted.    NEURO: A&Ox3, strength 5/5 all extremities, no focal deficits    RADIOLOGY: < from: CT Head No Cont (20 @ 23:01) >   EXAM:  CT BRAIN                          PROCEDURE DATE:  2020          INTERPRETATION:    PROCEDURE INFORMATION:   Exam: CT Head Without Contrast   Exam date and time: 2020 10:51 PM Age: 70 years old   Clinical indication: Altered mental status/memory loss, status epilepticus, severe headache, convulsions    TECHNIQUE:   Imaging protocol: Computed tomography of the head without contrast.     COMPARISON: CT HEAD 2020 6:32 PM     FINDINGS:   Brain: Age-appropriate volume loss. There is no intracranial hemorrhage, ventricular shift, or mass effect. Mild microvascular ischemic disease in the white matter. Consider MRI for further evaluation if the patient is MRI compatible.  Ventricles: Age-appropriate. No ventriculomegaly.   Bones/joints: Patient status post partially visualized surgery posterior aspect   C1-C2, partially visually. Zygomatic arch intact. The calvarium is intact.   Sinuses: The visualized paranasal sinuses are clear.   Mastoid air cells: There are no mastoideffusions.     Soft tissues: Unremarkable.     IMPRESSION:   The unenhanced brain CT appears nonacute. The current unenhanced brain CT remains nonacute and unchanged compared to the scan done earlier in the day.  Preliminary report provided by TERESITA PALACIOS M.D.;[facility] RADIOLOGIST .       < end of copied text >       TIME SPENT: 35 min  Evaluating/treating patient, reviewing data/labs/imaging, discussing case with multidisciplinary team, discussing plan/goals of care with patient/family. Non-inclusive of procedure time.

## 2020-07-05 NOTE — CONSULT NOTE ADULT - ASSESSMENT
The patient is a 70y Female who is followed by neurology because of possible focal seizure activity    Seizure  possible focal seizure  agree keppra 1000 mg q12h  will order cEEG as it is not clear that this represents epileptiform activity  Can not check MRI with spinal cord stimulator    Possible meningitis  very doubtful   non toxic appearing, supple neck  WBC normalized  agree to not hold eliquis and d/c LP at this time    will follow with you    Masoud Dominique MD PhD   [id number]

## 2020-07-05 NOTE — CONSULT NOTE ADULT - SUBJECTIVE AND OBJECTIVE BOX
[facility]                                     Neurology at [facility]                                 [facility]                                  [address]                                        [place], [place], [address]                                             [phone]    CC: [email or web address]  HPI:  The patient is a 70y Female who presented with left sided shaking that was intermittent and uncontrollable.  She denied seizure history.  She has spinal cord stimulator per chart and said it may be malfunctioning.  There was concern for partial status epilepticus and she has been monitored in the MICU overnight with continued left sided twitching, [id number] not present on my exam.  She had mild fever with elevated WBC, but was unable to perform LP in the ED because she is on eliquis for a fib.  She is currently not altered, but was yesterday after receiving ativan for seizure activity.  There is also question of subsatnce abuse and abrupt cessation of an "anxiety medication" that has not been identified. Neuro eval is requested.    PAST MEDICAL & SURGICAL HISTORY:  S/P insertion of spinal cord stimulator  Lung nodule  Aortic stenosis  Breast CA  OA (osteoarthritis)  Atrial fibrillation  HLD (hyperlipidemia)  HTN (hypertension)  H/O left mastectomy      MEDICATIONS  (STANDING):  apixaban 5 milliGRAM(s) Oral two times a day  aspirin  chewable 81 milliGRAM(s) Oral daily  atorvastatin 10 milliGRAM(s) Oral at bedtime  levETIRAcetam  IVPB 500 milliGRAM(s) IV Intermittent every 12 hours  levothyroxine 112 MICROGram(s) Oral daily  lidocaine   Patch 1 Patch Transdermal every 24 hours  metoprolol tartrate 25 milliGRAM(s) Oral two times a day    MEDICATIONS  (PRN):  LORazepam   Injectable 2 milliGRAM(s) IV Push every 5 minutes PRN Seizure      Allergies    No Known Allergies    Intolerances        SOCIAL HISTORY:  no tob,   no alcohol   questionable drug use per son    FAMILY HISTORY:  denied stroke in either parent      ROS: 14 point ROS negative other than what is present in HPI or below  as above    Vital Signs Last 24 Hrs  T(C): 36.8 (05 Jul 2020 11:08), Max: 37.6 (04 Jul 2020 19:39)  T(F): 98.2 (05 Jul 2020 11:08), Max: 99.7 (04 Jul 2020 19:39)  HR: 99 (05 Jul 2020 12:00) (79 - 116)  BP: 104/64 (05 Jul 2020 12:00) (101/63 - 169/76)  BP(mean): 76 (05 Jul 2020 12:00) (75 - 110)  RR: 17 (05 Jul 2020 12:00) (10 - 36)  SpO2: 99% (05 Jul 2020 12:00) (96% - 100%)      General: NAD    Detailed Neurologic Exam:    Mental status: The patient is awake and alert and has normal attention span.  The patient is fully oriented in 3 spheres. The patient is oriented to current events. The patient is able to name objects, follow commands, repeat sentences.    Neck: supple    Cranial nerves: Pupils equal and react symmetrically to light. There is no visual field deficit to confrontation. Extraocular motion is full with no nystagmus. There is no ptosis. Facial sensation is intact. Facial musculature is symmetric. Palate elevates symmetrically.  Tongue is midline.    Motor: There is normal bulk and tone.  There is no tremor.  Strength is 5/5 in the right arm and leg.   Strength is 5/5 in the left arm and leg.    Sensation: Intact to light touch and pin in 4 extremities    Reflexes: 1+ throughout and plantar responses are flexor.    Cerebellar: There is no dysmetria on finger to nose testing.    Gait : deferred    LABS:                         11.2   10.47 )-----------( 326      ( 05 Jul 2020 06:21 )             35.2       07-05    136  |  99  |  10.0  ----------------------------<  125<H>  4.2   |  23.0  |  0.68    Ca    8.9      05 Jul 2020 04:58  Phos  2.5     07-05  Mg     1.8     07-05    TPro  8.3  /  Alb  4.4  /  TBili  0.2<L>  /  DBili  x   /  AST  28  /  ALT  17  /  AlkPhos  74  07-04      PT/INR - ( 05 Jul 2020 06:21 )   PT: 14.2 sec;   INR: 1.24 ratio         PTT - ( 04 Jul 2020 19:14 )  PTT:37.2 sec    RADIOLOGY & ADDITIONAL STUDIES (independently reviewed unless otherwise noted):  Head CT no acute CVA, mass or blood,  (+) SVID/atrophy

## 2020-07-05 NOTE — PROGRESS NOTE ADULT - SUBJECTIVE AND OBJECTIVE BOX
Transfer Note   Pt is for tx to medical service from ICU   CC : Patient is a 70y old  Female who presents with a chief complaint of LOC , suspected seizure   chart reviewed , pt is awake alert sitting in the bed , having left leg twitching and shakes , normal speech           HPI:  71 y/o F with a h/o HTN, HLD, hypothyroid, AF (on apixaban), AS, breast CA (in remission), left lung nodule, spinal stimulator for pain, presents to the ED with LUE twitching/tremor and complaints of "spinal stimulator malfunction". As per ED physician there was concern for partial seizure activity and then the twitching/tremulousness began in the RUE as well and she subsequently developed tonic-clonic seizure activity lasting ~ 1 minute. She received 2mg IV lorazepam. Now with significant encephalopathy and continued twitching/rhythmic muscle contractions of LUE. She is nonverbal and not following commands. Also noted to have a borderline fever (99.7'F) and a WBC of 19K with left shift. CT head neg for acute pathology. Of note, her son reported that she recently stopped taking a medication for anxiety and does have a history of substance abuse although was not specific but did deny EtOH abuse. He also reported that she has had a 50lb unintentional weight loss over the past year and has experienced "chela and episodes of drooling". (2020 20:54)      Allergies    No Known Allergies    Intolerances          Vital Signs Last 24 Hrs  T(C): 36.8 (2020 11:08), Max: 37.6 (2020 19:39)  T(F): 98.2 (2020 11:08), Max: 99.7 (2020 19:39)  HR: 99 (2020 12:00) (79 - 116)  BP: 104/64 (2020 12:00) (101/63 - 169/76)  BP(mean): 76 (2020 12:00) (75 - 110)  RR: 17 (2020 12:00) (10 - 36)  SpO2: 99% (2020 12:00) (96% - 100%)    PHYSICAL EXAM:    GENERAL: NAD, looks elder than  her age  EYES: EOMI, PERRLA, conjunctiva and sclera clear  NECK: Supple, No JVD, Normal thyroid  NERVOUS SYSTEM:  Alert & Oriented X3, left leg twitching + , MS 2-3/5 at present , no sensory deficits , Good concentration;  DTRs 2+ intact and symmetric  CHEST/LUNG: Clear to auscultation bilaterally; No rales, rhonchi  HEART: Regular rate and rhythm; s1/s2 ; No murmurs, rubs, or gallops  ABDOMEN: Soft, Nontender, Nondistended; Bowel sounds present  EXTREMITIES:  No clubbing, cyanosis, or edema   SKIN: No rashes or lesions      LABS:                        11.2   10.47 )-----------( 326      ( 2020 06:21 )             35.2     07-05    136  |  99  |  10.0  ----------------------------<  125<H>  4.2   |  23.0  |  0.68    Ca    8.9      2020 04:58  Phos  2.5     07-05  Mg     1.8     07-05    TPro  8.3  /  Alb  4.4  /  TBili  0.2<L>  /  DBili  x   /  AST  28  /  ALT  17  /  AlkPhos  74  07-04    PT/INR - ( 2020 06:21 )   PT: 14.2 sec;   INR: 1.24 ratio         PTT - ( 2020 19:14 )  PTT:37.2 sec  Urinalysis Basic - ( 2020 21:22 )    Color: Yellow / Appearance: Clear / S.015 / pH: x  Gluc: x / Ketone: Trace  / Bili: Negative / Urobili: Negative mg/dL   Blood: x / Protein: 30 mg/dL / Nitrite: Negative   Leuk Esterase: Small / RBC: Negative /HPF / WBC 0-2   Sq Epi: x / Non Sq Epi: Occasional / Bacteria: x        RADIOLOGY & ADDITIONAL TESTS:

## 2020-07-05 NOTE — PROGRESS NOTE ADULT - ASSESSMENT
69 y/o female pmhx HTN, HLD, hypothyroid, Afib on Eliquis, AS, breast cancer( in remission), left lung nodule, spinal stimulator presented to ED last night for LUE twitching/ tremor. Admitted for new onset seizures and airway monitoring. She has not had any reoccurrence of seizures, back to baseline mental status. Optimized for transfer out of MICU. Transfer to SDU.    Plan:  - Plan for spot EEG.   - Keppra BID. Seizure precautions Ativan PRN for seizure activity. Neurology consult   - Restart Eliquis for afib. Rate control w/ metoprolol BID   - Will d/c antibiotics low suspension for meningitis. ID consult as needed.   - Continue home dose synthroid    - with unintentional weigh loss and new onset seizures w/ left lung nodule would r/o brain mass, however unable to get MRI due spinal stimulator. 69 y/o female pmhx HTN, HLD, hypothyroid, Afib on Eliquis, AS, breast cancer( in remission), left lung nodule, spinal stimulator presented to ED last night for LUE twitching/ tremor. Admitted for new onset seizures and airway monitoring. She has not had any reoccurrence of seizures, back to baseline mental status. Optimized for transfer out of MICU. Transfer to SDU.    Plan:  - Plan for spot EEG.   - Keppra BID. Seizure precautions Ativan PRN for seizure activity. Neurology consult   - Restart Eliquis for afib. Rate control w/ metoprolol BID   - Will d/c antibiotics low suspension for meningitis, leukocytosis improving likely reactive.  ID consult as needed.   - Continue home dose synthroid    - Utox negative.   - with unintentional weigh loss and new onset seizures w/ left lung nodule would r/o brain mass, however unable to get MRI due spinal stimulator. CT head negative for acute pathology.     Case endorsed to Dr. Hernandez

## 2020-07-05 NOTE — PROGRESS NOTE ADULT - ASSESSMENT
71 y/o female pmhx HTN, HLD, hypothyroid, Afib on Eliquis, AS, breast cancer( in remission), left lung nodule, spinal stimulator presented to ED   for LUE twitching/ tremor. Patient had RUE twitching/ Tremor subsequently developing tonic-clonic seizure  lasting about 1 minute.   Admitted to ICU for airway monitoring given post ictal state and encephelopathy. Son last night reported that she has had a 50lb unintentional weight loss over the past year and has experienced "chela and episodes of drooling". Started on iv keppra  , seen by neurologist , EEG ordered Pt is with intermittent left leg twitching  from groin down during my evaluation ,  Ct of head x2 unremarkable      1- suspected  seizure partial   on keppra   iv   neurology consult noted   EEG ordered   cont to monitor     2- atrial  fibrillation    controlled HR   cont anticoagulation

## 2020-07-06 NOTE — CONSULT NOTE ADULT - CONSULT REQUESTED BY NAME
Medications that are requested 2    Medications selected in [facility]    Patient pharmacy [facility]    Patient pharmacy entered in [employer]    Patient notified that refill may take 48-72 hoursYes    Callback Number: [phone]    Best Availability: anytime    Can A Detailed Message Be Left?Yes     Additional Info: none    Informed patient to check with their pharmacy for the status of the refill and they will only be contacted if there is an issue with the refillYes  
Dr Calderon
Dr. Schulz

## 2020-07-06 NOTE — PROGRESS NOTE ADULT - SUBJECTIVE AND OBJECTIVE BOX
Pt is for tx to medical service from ICU for seizure disorder   pt is seen in am and now again , she is with some confusion forgetful at times   she is with twitching of her left leg at times , she is awake alert oriented x3 at present   I had spoke to her son over the phone , he reports that his mom has been confused , stumbling in his words             Vital Signs Last 24 Hrs  T(C): 36.4 (2020 09:58), Max: 37.1 (2020 20:00)  T(F): 97.5 (2020 09:58), Max: 98.8 (2020 20:00)  HR: 83 (2020 09:58) (71 - 86)  BP: 122/69 (2020 09:58) (110/61 - 136/72)  BP(mean): 94 (2020 00:00) (76 - 94)  RR: 18 (2020 09:58) (17 - 21)  SpO2: 98% (2020 09:58) (98% - 100%)      GENERAL: NAD, looks elder than  her age  NECK: Supple, No JVD, Normal thyroid  Neuro : left leg MS 4/5 , moves lift the leg when asked , normal speech , no distress   CHEST/LUNG: Clear to auscultation bilaterally; No rales, rhonchi  HEART: Regular rate and rhythm; s1/s2 ; No murmurs, rubs, or gallops  ABDOMEN: Soft, Nontender, Nondistended; Bowel sounds present  EXTREMITIES:  No clubbing, cyanosis, or edema                           12.3   11.15 )-----------( 358      ( 2020 09:13 )             38.4   07-06    138  |  100  |  10.0  ----------------------------<  89  3.5   |  25.0  |  0.64    Ca    9.1      2020 09:13  Phos  2.5     07-06  Mg     2.1     07-06    TPro  8.3  /  Alb  4.4  /  TBili  0.2<L>  /  DBili  x   /  AST  28  /  ALT  17  /  AlkPhos  74  07-04       PTT - ( 2020 19:14 )  PTT:37.2 sec  Urinalysis Basic - ( 2020 21:22 )    Color: Yellow / Appearance: Clear / S.015 / pH: x  Gluc: x / Ketone: Trace  / Bili: Negative / Urobili: Negative mg/dL   Blood: x / Protein: 30 mg/dL / Nitrite: Negative   Leuk Esterase: Small / RBC: Negative /HPF / WBC 0-2   Sq Epi: x / Non Sq Epi: Occasional / Bacteria: x

## 2020-07-06 NOTE — EEG REPORT - NS EEG TEXT BOX
[facility]   COMPREHENSIVE EPILEPSY CENTER   REPORT OF LONG-TERM VIDEO EEG     [facility]: [address], [place] [address], Ph#: [phone]  [facility]: [address], [place] [address], Ph#: [phone]  [facility]: [address] [place] [address], Ph#: [phone]    Patient Name: SKYLER OSUNA  Age and : 70y ([birth year])  MRN #: [id number]  Location: [place] [address]  Referring Physician: Raquel Schulz    Start Time/Date: 18:31 on 20  End Time/Date: 01:00 on 20  Duration: 6hr 29m    _____________________________________________________________  STUDY INFORMATION    EEG Recording Technique:  The patient underwent continuous Video-EEG monitoring, using Telemetry System hardware on the XLTek Digital System. EEG and video data were stored on a computer hard drive with important events saved in digital archive files. The material was reviewed by a physician (electroencephalographer / epileptologist) on a daily basis. Andrew and seizure detection algorithms were utilized and reviewed. An EEG Technician attended to the patient, and was available throughout daytime work hours.  The epilepsy center neurologist was available in person or on call 24-hours per day.    EEG Placement and Labeling of Electrodes:  The EEG was performed utilizing 20 channel referential EEG connections (coronal over temporal over parasagittal montage) using all standard 10-20 electrode placements with EKG, with additional electrodes placed in the inferior temporal region using the modified 10-10 montage electrode placements for elective admissions, or if deemed necessary. Recording was at a sampling rate of 256 samples per second per channel. Time synchronized digital video recording was done simultaneously with EEG recording. A low light infrared camera was used for low light recording.     _____________________________________________________________  HISTORY    Patient is a 70y old  Female who presents with a chief complaint of Status epilepticus (2020 14:16)      PERTINENT MEDICATION:  levETIRAcetam  IVPB 500 milliGRAM(s) IV Intermittent every 12 hours    _____________________________________________________________  STUDY INTERPRETATION    Findings: The background was continuous, spontaneously variable and reactive. During wakefulness, fragments of 7 Hz posterior dominant rhythm with amplitude to 30 uV.    Background Slowing:  Background predominantly consisted of theta, delta and faster activities.    Focal Slowing:   None were present.    Sleep Background:  Drowsiness was characterized by fragmentation, attenuation, and slowing of the background activity.    Sleep was characterized by the presence of asymmetric sleep spindles and K-complexes, better formed over the left hemisphere.    Other Non-Epileptiform Findings:  None were present.    Interictal Epileptiform Activity:   Near continuous bilateral asymmetric lateralized periodic discharges with rhythmicity (LPD+R) broadly distributed over the right hemisphere, max in the frontal region (max Fp2/F4/Fz >O2/P8).    Events:  Clinical events: None recorded.  Seizures: None recorded.    Activation Procedures:   Hyperventilation was not performed.    Photic stimulation was not performed.     Artifacts:  Intermittent myogenic and movement artifacts were noted.    _____________________________________________________________  EEG SUMMARY/CLASSIFICATION    Abnormal EEG in the awake, drowsy and asleep states.  - Near continuous bilateral asymmetric lateralized periodic discharges with rhythmicity (LPD+R) broadly distributed over the right hemisphere, max in the frontal region (max Fp2/F4/Fz >O2/P8).  - Mild to moderate generalized slowing.    _____________________________________________________________  EEG IMPRESSION/CLINICAL CORRELATE    Abnormal EEG study.  1. Potential epileptogenic focus max in the right frontal region.   2. Mild to moderate nonspecific diffuse or multifocal cerebral dysfunction.   3. No seizure seen.    _____________________________________________________________    Francine Velasquez MD  Attending Physician, [facility] Epilepsy [facility]   COMPREHENSIVE EPILEPSY CENTER   REPORT OF LONG-TERM VIDEO EEG     [facility]: [address], [place] [address], Ph#: [phone]  [facility]: [address], [place] [address], Ph#: [phone]  [facility]: [address] [place] [address], Ph#: [phone]    Patient Name: SKYLER OSUNA  Age and : 70y (54)  MRN #: [id number]  Location: [place] [address]  Referring Physician: Raquel Schulz    Start Time/Date: 18:31 on 20  End Time/Date: 01:00 on 20  Duration: 6hr 29m    _____________________________________________________________  STUDY INFORMATION    EEG Recording Technique:  The patient underwent continuous Video-EEG monitoring, using Telemetry System hardware on the XLTek Digital System. EEG and video data were stored on a computer hard drive with important events saved in digital archive files. The material was reviewed by a physician (electroencephalographer / epileptologist) on a daily basis. Andrew and seizure detection algorithms were utilized and reviewed. An EEG Technician attended to the patient, and was available throughout daytime work hours.  The epilepsy center neurologist was available in person or on call 24-hours per day.    EEG Placement and Labeling of Electrodes:  The EEG was performed utilizing 20 channel referential EEG connections (coronal over temporal over parasagittal montage) using all standard 10-20 electrode placements with EKG, with additional electrodes placed in the inferior temporal region using the modified 10-10 montage electrode placements for elective admissions, or if deemed necessary. Recording was at a sampling rate of 256 samples per second per channel. Time synchronized digital video recording was done simultaneously with EEG recording. A low light infrared camera was used for low light recording.     _____________________________________________________________  HISTORY    Patient is a 70y old  Female who presents with a chief complaint of Status epilepticus (2020 14:16)      PERTINENT MEDICATION:  levETIRAcetam  IVPB 500 milliGRAM(s) IV Intermittent every 12 hours    _____________________________________________________________  STUDY INTERPRETATION    Findings: The background was continuous, spontaneously variable and reactive. During wakefulness, fragments of 7 Hz posterior dominant rhythm with amplitude to 30 uV.    Background Slowing:  Background predominantly consisted of theta, delta and faster activities.    Focal Slowing:   None were present.    Sleep Background:  Drowsiness was characterized by fragmentation, attenuation, and slowing of the background activity.    Sleep was characterized by the presence of asymmetric sleep spindles and K-complexes, better formed over the left hemisphere.    Other Non-Epileptiform Findings:  None were present.    Interictal Epileptiform Activity:   Near continuous fluctuating 1-2 Hz bilateral asymmetric lateralized periodic discharges with rhythmicity (LPD+R) broadly distributed over the right hemisphere, max in the frontal region (max Fp2/F4/Fz >O2/P8).    Events:  Clinical events: None recorded.  Seizures: None recorded.    Activation Procedures:   Hyperventilation was not performed.    Photic stimulation was not performed.     Artifacts:  Intermittent myogenic and movement artifacts were noted.    _____________________________________________________________  EEG SUMMARY/CLASSIFICATION    Abnormal EEG in the awake, drowsy and asleep states.  - Near continuous fluctuating 1-2 Hz bilateral asymmetric lateralized periodic discharges with rhythmicity (LPD+R) broadly distributed over the right hemisphere, max in the frontal region (max Fp2/F4/Fz >O2/P8).  - Mild to moderate generalized slowing.    _____________________________________________________________  EEG IMPRESSION/CLINICAL CORRELATE    Abnormal EEG study.  1. Potential epileptogenic focus max in the right frontal region.   2. Mild to moderate nonspecific diffuse or multifocal cerebral dysfunction.   3. No seizure seen.    _____________________________________________________________    Francine Velasquez MD  Attending Physician, [facility]

## 2020-07-06 NOTE — CONSULT NOTE ADULT - ASSESSMENT
69yo LH female with a history of HTN, HLD, hypothyroid, afib (on apixaban), AS, left breast CA s/p mastectomy and chemo 2010, bilateral lung nodules, spinal stimulator for pain ([employer], unclear if MRI compatible) who presented with epilepsia partialis continua, without prior history of epilepsy.      Impression:  1. 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 Most concerning for brain mets given h/o breast CA and lung nodules, with recent 50lb wt loss. However, unable to obtain MRI due to spinal stimulator.     2. Left EXT weakness: Likely postictal paresis. CT T-L unremarkable. No UMN signs on exam.          Recommendation:  - transfer to EMU in 6T for cvEEG with continue monitoring   - s/p levetiracetam 2gm IV and increased maintenance from 500mg BID to 1000mg BID  - start lacosamide 100mg BID for persistent, though infrequent, focal motor seizure despite increased levetiracetam   - pending CT w/ contrast A/C/P/H to r/o malignancy   - lorazepam 2mg IV prn convulsion (grand mal seizure)  - seizure precaution      Thank you for allowing Epilepsy to participate in the care of this patient.   ______________________  Francine Velasquez MD   [facility] Epilepsy  Cell: [phone] 69yo LH female with a history of HTN, HLD, hypothyroid, afib (on apixaban), AS, left breast CA s/p mastectomy and chemo 2010, bilateral lung nodules, spinal stimulator for pain ([employer], unclear if MRI compatible) who presented with epilepsia partialis continua, without prior history of epilepsy.      Impression:  1. Newly diagnosed focal epilepsy presenting as focal motor status epilepticus: Status epilepticus resolved, but continues to have intermittent focal motor seizures. Most concerning for brain mets given h/o breast CA and lung nodules, with recent 50lb wt loss. However, unable to obtain MRI due to spinal stimulator.     2. Left EXT weakness: Likely postictal paresis. CT T-L unremarkable. No UMN signs on exam.      Recommendation:  - transfer to EMU in 6T for cvEEG with continue monitoring   - s/p levetiracetam 2gm IV and increased maintenance from 500mg BID to 1000mg BID  - start lacosamide 100mg BID for persistent, though infrequent, focal motor seizure despite increased levetiracetam   - pending CT w/ contrast A/C/P/H to r/o malignancy   - monitor left EXT weakness  - lorazepam 2mg IV prn convulsion (grand mal seizure)  - seizure precaution  - needs to resume follow-up with oncology and pulmonology  - follow-up with me in clinic: [facility] [place] ([phone]), [address] [place] [address]       Thank you for allowing Epilepsy to participate in the care of this patient.   ______________________  Francine Velasquez MD   [facility] Epilepsy  Cell: [phone]

## 2020-07-06 NOTE — PROGRESS NOTE ADULT - ASSESSMENT
71 y/o female pmhx HTN, HLD, hypothyroid, Afib on Eliquis, AS, breast cancer( in remission), left lung nodule, spinal stimulator presented to ED   for LUE twitching/ tremor. Patient had RUE twitching/ Tremor subsequently developing tonic-clonic seizure  lasting about 1 minute.   Admitted to ICU for airway monitoring given post ictal state and encephelopathy. Son last night reported that she has had a 50lb unintentional weight loss over the past year and has experienced "chela and episodes of drooling". Started on iv keppra  , seen by neurologist , EEG ordered Pt is with intermittent left leg twitching  from groin down during my evaluation ,  Ct of head x2 unremarkable      1- seizure partial with abnormal  EEG   neurology f note appreciated   antiepileptic meds doses adjusted   she needs MR of brain however not clear if her neuro stimulator is compatible with the MR   called DR Ebstein her neurosurgeon and Way[facility]  contacted to clarify ( waiting for call back )   cont video EGG   will tx to 6 tower for EEG video monitoring       2- atrial  fibrillation    controlled HR   cont anticoagulation and metoprolol   resume home meds flecainide     3- h/o breast cancer / mastectomy in the past   will get MR of brain if compatible with stimulator ( will check to the company )     4- Hypothyroidsm on levothyroxine     5- Encephalopathy  likely due to seizure , check ammonia level   brain imaging mr if able

## 2020-07-06 NOTE — PHYSICAL THERAPY INITIAL EVALUATION ADULT - PERTINENT HX OF CURRENT PROBLEM, REHAB EVAL
69 y/o female pmhx HTN, HLD, hypothyroid, Afib on Eliquis, AS, breast cancer( in remission), left lung nodule, spinal stimulator presented to ED   for LUE twitching/ tremor. Patient had RUE twitching/ Tremor subsequently developing tonic-clonic seizure  lasting about 1 minute.

## 2020-07-06 NOTE — CONSULT NOTE ADULT - SUBJECTIVE AND OBJECTIVE BOX
[facility]                                                                      Francine Velasquez MD                                         Office: [address] [place] [address], [address]                                                 Phone: [phone]; Fax: [phone]                            ==============================================    EPILEPSY INITIAL CONSULT NOTE      ADMITTING DIAGNOSIS: Convulsions      HPI:  This is a 69yo LH female with a history of HTN, HLD, hypothyroid, afib (on apixaban), AS, left breast CA s/p mastectomy and chemo 2010, bilateral lung nodules, spinal stimulator for pain ([employer], unclear if MRI compatible) who presented with epilepsia partialis continua, without prior history of epilepsy.    Pt said she was at home, on couch, compiling a list of some sort. The next thing she came to was waking up on couch, with her nephew standing over her and looking concerned. Pt apparently lost consciousness on the couch. Soon after waking up, she developed involuntary jerking of her LLE > LUE. Jerking persisted until she was admitted to the hospital and given levetiracetam load. Continuous EEG showed near-continuous right hemispheric LPD, c/w focal motor status epilepticus. At time of interview, patient continues to have intermittent, infrequent jerking of LLE and LUE.     There was initially a question of meningitis/encephalitis on presentation, with elevated WBC and AMS, but it was later attributed to reactive leukocytosis and transient encephalopathy secondary to benzo administered to abort status epilepticus.    Except for one alcohol withdrawal years ago, patient denied prior seizure or seizure-like events including staring spells, lapse of time, LOC, involuntary focal jerking/shaking, convulsion, metallic/foul smell or taste, GI rising sensation, intense cara vu, unexplained anxiety or fear. Patient has not seen her oncologist for over 10 yrs. Last saw her pulmonologist 2 yrs ago. Does not recall name of either doctor.    SEIZURE DESCRIPTION AND TYPE:  Type #1  Severity: focal motor seizure + focal motor status epilepticus   Onset: 20  Quality & associated signs/symptoms: Twitching of LLE > LUE  Duration: few seconds, up to hours  Timing: intermittently throughout  -   Modifying factors: unknown trigger   Diurnal Variation: none    EPILEPSY TYPE: focal epilepsy, unknown etiology  HISTORY OF TONIC-CLONIC SZ: unknown  HISTORY OF STATUS EPILEPTICUS: yes, as focal motor status epilepticus     SEIZURE RISK FACTORS:  H/o left breast cancer s/p mastectomy and chemo. Bilateral lung nodules. Unable to obtain MRI to r/o brain mets. Patient was a product of normal pregnancy and delivery. No history of febrile seizure, TBI, CNS infection, or FH of seizures.    CURRENT AEDs:  levetiracetam 500mg BID - started 20  pregabalin 50mg BID - for neuropathic pain    PREVIOUS AED:  none    IMAGING:   < from: CT Head No Cont (20 @ 23:01) >  IMPRESSION:   The unenhanced brain CT appears nonacute. The current unenhanced brain CT remains nonacute and unchanged compared to the scan done earlier in the day.     < from: CT Head w/wo IV Cont (20 @ 14:19) >  IMPRESSION: No evidence of acute hemorrhage, mass, mass effect effect or abnormal areas of enhancement seen.      NEUROPHYSIOLOGY:  cEEG  - 20 ([facility]): Abnormal EEG in the awake, drowsy and asleep states.  - Near continuous bilateral asymmetric lateralized periodic discharges with rhythmicity (LPD+R) broadly distributed over the right hemisphere, max in the frontal region (max Fp2/F4/Fz >O2/P8).  - Mild to moderate generalized slowing.    _____________________________________________________________  EEG IMPRESSION/CLINICAL CORRELATE    Abnormal EEG study.  1. Potential epileptogenic focus max in the right frontal region.   2. Mild to moderate nonspecific diffuse or multifocal cerebral dysfunction.   3. No seizure seen.      NEUROPSYCHOLOGY:   none    PMH:  S/P insertion of spinal cord stimulator  Lung nodule  Aortic stenosis  Breast CA  OA (osteoarthritis)  Atrial fibrillation  HLD (hyperlipidemia)  HTN (hypertension)      PSH:  H/O left mastectomy  appendecomy  cholecystectomy   spinal cord stimulator placement    MEDICATION:  apixaban 5 milliGRAM(s) Oral two times a day  aspirin  chewable 81 milliGRAM(s) Oral daily  atorvastatin 10 milliGRAM(s) Oral at bedtime  escitalopram 10 milliGRAM(s) Oral daily  flecainide 50 milliGRAM(s) Oral two times a day  levETIRAcetam  IVPB 500 milliGRAM(s) IV Intermittent every 12 hours  levothyroxine 112 MICROGram(s) Oral daily  lidocaine   Patch 1 Patch Transdermal every 24 hours  LORazepam   Injectable 2 milliGRAM(s) IV Push every 5 minutes PRN Seizure  metoprolol succinate ER 25 milliGRAM(s) Oral daily  pregabalin 50 milliGRAM(s) Oral two times a day  tamsulosin 0.4 milliGRAM(s) Oral at bedtime    ALLERGIES:  No Known Allergies    FH:  noncontributory     SH:  H/o EtOH and tobacco. No illicit drugs.    ROS:  Neurology as per HPI, otherwise negative for constitutional, skin, eyes, ENT, respiratory, cardiovascular, gastrointestinal, genitourinary, musculoskeletal, psychiatric, hematology/lymphatics, endocrine, allergic/immunologic.    VITALS:  T(C): 36.7 (20 @ 17:09), Max: 37.1 (20 @ 04:00)  HR: 92 (20 @ 17:09) (80 - 92)  BP: 143/78 (20 @ 17:09) (122/69 - 143/78)  ABP: --  RR: 18 (20 @ 17:09) (17 - 18)  SpO2: 100% (20 @ 17:09) (98% - 100%)  CVP(cm H2O): --    GENERAL PHYSICAL EXAM:  GEN: no distress  HEENT:  NCAT, OP clear  EYES: sclera white, conjunctiva clear, no nystagmus  NECK: supple  CV: RRR, no murmur     		  PULM: CTAB, no wheezing  ABD: soft, +BS, NT  EXT: peripheral pulse intact, no cyanosis  MSK: muscle tone and strength normal  SKIN: warm, dry, no rash or lesion on exposed skin    NEUROLOGICAL EXAM:  Mental Status  Orientation: alert and oriented to person, place, time, and situation   Language: clear and fluent, intact comprehension and repetition, intact naming and reading    Cranial Nerves  II: full visual fields intact   III, IV, VI: PERRL, EOMI  V, VII: facial sensation and movement intact and symmetric   VIII: hearing intact   IX, X: uvula midline, soft palate elevates normally   XI: BL shoulder shrug intact   XII: tongue midline    Motor  5/5 R EXT  4+/5 LUE  3/5 LLE               Tone and bulk are normal in upper and lower limbs  Slight pronator drift in LUE    Sensation  Intact to light touch and pinprick in all 4 EXTs    Reflex  2+ in BL biceps and patella                                   Plantar responses downward bilaterally    Coordination  Normal FTN bilaterally    Gait  Deferred      LABS:                          12.3   11.15 )-----------( 358      ( 2020 09:13 )             38.4     07-06    138  |  100  |  10.0  ----------------------------<  89  3.5   |  25.0  |  0.64    Ca    9.1      2020 09:13  Phos  2.5     07-06  Mg     2.1     07-06      CAPILLARY BLOOD GLUCOSE          PT/INR - ( 2020 06:21 )   PT: 14.2 sec;   INR: 1.24 ratio           Urinalysis Basic - ( 2020 21:22 )    Color: Yellow / Appearance: Clear / S.015 / pH: x  Gluc: x / Ketone: Trace  / Bili: Negative / Urobili: Negative mg/dL   Blood: x / Protein: 30 mg/dL / Nitrite: Negative   Leuk Esterase: Small / RBC: Negative /HPF / WBC 0-2   Sq Epi: x / Non Sq Epi: Occasional / Bacteria: x [facility]                                                                      Francine Velasquez MD                                         Office: [address] [place] [address], [address]                                                 Phone: [phone]; Fax: [phone]                            ==============================================    EPILEPSY INITIAL CONSULT NOTE      ADMITTING DIAGNOSIS: Convulsions      HPI:  This is a 69yo LH female with a history of HTN, HLD, hypothyroid, afib (on apixaban), AS, left breast CA s/p mastectomy and chemo 2010, bilateral lung nodules, spinal stimulator for pain ([employer], unclear if MRI compatible) who presented with epilepsia partialis continua, without prior history of epilepsy.    Pt said she was at home, on couch, compiling a list of some sort. The next thing she came to was waking up on couch, with her nephew standing over her and looking concerned. Pt apparently lost consciousness on the couch. Soon after waking up, she developed involuntary jerking of her LLE > LUE. Jerking persisted until she was admitted to the hospital and given levetiracetam load. Continuous EEG showed near-continuous right hemispheric LPD, c/w focal motor status epilepticus. At time of interview, patient continues to have intermittent, infrequent jerking of LLE and LUE. In retrospect, patient said she has had intermittent left sided twitching for 6-12 months.    There was initially a question of meningitis/encephalitis on presentation, with elevated WBC and AMS, but it was later attributed to reactive leukocytosis and transient encephalopathy secondary to benzo administered to abort status epilepticus.    Except for one alcohol withdrawal years ago, patient denied prior seizure or seizure-like events including staring spells, lapse of time, LOC, involuntary focal jerking/shaking, convulsion, metallic/foul smell or taste, GI rising sensation, intense cara vu, unexplained anxiety or fear. Patient has not seen her oncologist for over 10 yrs. Last saw her pulmonologist 2 yrs ago. Does not recall name of either doctor.    SEIZURE DESCRIPTION AND TYPE:  Type #1  Severity: focal motor seizure + focal motor status epilepticus   Onset: 20, but might have started mid-2019  Quality & associated signs/symptoms: Twitching of LLE > LUE  Duration: few seconds, up to hours  Timing: intermittently left sided twitching since mid-, focal motor status epilepticus 20  Modifying factors: unknown trigger   Diurnal Variation: none    EPILEPSY TYPE: focal epilepsy, unknown etiology  HISTORY OF TONIC-CLONIC SZ: unknown  HISTORY OF STATUS EPILEPTICUS: yes, as focal motor status epilepticus     SEIZURE RISK FACTORS:  H/o left breast cancer s/p mastectomy and chemo. Bilateral lung nodules. Unable to obtain MRI to r/o brain mets. Patient was a product of normal pregnancy and delivery. No history of febrile seizure, TBI, CNS infection, or FH of seizures.    CURRENT AEDs:  levetiracetam 500mg BID - started 20  pregabalin 50mg BID - for neuropathic pain    PREVIOUS AED:  none    IMAGING:   < from: CT Head No Cont (20 @ 23:01) >  IMPRESSION:   The unenhanced brain CT appears nonacute. The current unenhanced brain CT remains nonacute and unchanged compared to the scan done earlier in the day.     < from: CT Head w/wo IV Cont (20 @ 14:19) >  IMPRESSION: No evidence of acute hemorrhage, mass, mass effect effect or abnormal areas of enhancement seen.      NEUROPHYSIOLOGY:  cEEG  - 20 ([facility]): Abnormal EEG in the awake, drowsy and asleep states.  - Near continuous bilateral asymmetric lateralized periodic discharges with rhythmicity (LPD+R) broadly distributed over the right hemisphere, max in the frontal region (max Fp2/F4/Fz >O2/P8).  - Mild to moderate generalized slowing.    _____________________________________________________________  EEG IMPRESSION/CLINICAL CORRELATE    Abnormal EEG study.  1. Potential epileptogenic focus max in the right frontal region.   2. Mild to moderate nonspecific diffuse or multifocal cerebral dysfunction.   3. No seizure seen.      NEUROPSYCHOLOGY:   none    PMH:  S/P insertion of spinal cord stimulator  Lung nodule  Aortic stenosis  Breast CA  OA (osteoarthritis)  Atrial fibrillation  HLD (hyperlipidemia)  HTN (hypertension)      PSH:  H/O left mastectomy  appendecomy  cholecystectomy   spinal cord stimulator placement    MEDICATION:  apixaban 5 milliGRAM(s) Oral two times a day  aspirin  chewable 81 milliGRAM(s) Oral daily  atorvastatin 10 milliGRAM(s) Oral at bedtime  escitalopram 10 milliGRAM(s) Oral daily  flecainide 50 milliGRAM(s) Oral two times a day  levETIRAcetam  IVPB 500 milliGRAM(s) IV Intermittent every 12 hours  levothyroxine 112 MICROGram(s) Oral daily  lidocaine   Patch 1 Patch Transdermal every 24 hours  LORazepam   Injectable 2 milliGRAM(s) IV Push every 5 minutes PRN Seizure  metoprolol succinate ER 25 milliGRAM(s) Oral daily  pregabalin 50 milliGRAM(s) Oral two times a day  tamsulosin 0.4 milliGRAM(s) Oral at bedtime    ALLERGIES:  No Known Allergies    FH:  noncontributory     SH:  H/o EtOH and tobacco. No illicit drugs.    ROS:  Neurology as per HPI, otherwise negative for constitutional, skin, eyes, ENT, respiratory, cardiovascular, gastrointestinal, genitourinary, musculoskeletal, psychiatric, hematology/lymphatics, endocrine, allergic/immunologic.    VITALS:  T(C): 36.7 (20 @ 17:09), Max: 37.1 (20 @ 04:00)  HR: 92 (20 @ 17:09) (80 - 92)  BP: 143/78 (20 @ 17:09) (122/69 - 143/78)  ABP: --  RR: 18 (20 @ 17:09) (17 - 18)  SpO2: 100% (20 @ 17:09) (98% - 100%)  CVP(cm H2O): --    GENERAL PHYSICAL EXAM:  GEN: no distress  HEENT:  NCAT, OP clear  EYES: sclera white, conjunctiva clear, no nystagmus  NECK: supple  CV: RRR, no murmur     		  PULM: CTAB, no wheezing  ABD: soft, +BS, NT  EXT: peripheral pulse intact, no cyanosis  MSK: muscle tone and strength normal  SKIN: warm, dry, no rash or lesion on exposed skin    NEUROLOGICAL EXAM:  Mental Status  Orientation: alert and oriented to person, place, time, and situation   Language: clear and fluent, intact comprehension and repetition, intact naming and reading    Cranial Nerves  II: full visual fields intact   III, IV, VI: PERRL, EOMI  V, VII: facial sensation and movement intact and symmetric   VIII: hearing intact   IX, X: uvula midline, soft palate elevates normally   XI: BL shoulder shrug intact   XII: tongue midline    Motor  5/5 R EXT  4+/5 LUE  3/5 LLE               Tone and bulk are normal in upper and lower limbs  Slight pronator drift in LUE    Sensation  Intact to light touch and pinprick in all 4 EXTs    Reflex  2+ in BL biceps and patella                                   Plantar responses downward bilaterally    Coordination  Normal FTN bilaterally    Gait  Deferred      LABS:                          12.3   11.15 )-----------( 358      ( 2020 09:13 )             38.4     07-06    138  |  100  |  10.0  ----------------------------<  89  3.5   |  25.0  |  0.64    Ca    9.1      2020 09:13  Phos  2.5     07-06  Mg     2.1     07-06      CAPILLARY BLOOD GLUCOSE          PT/INR - ( 2020 06:21 )   PT: 14.2 sec;   INR: 1.24 ratio           Urinalysis Basic - ( 2020 21:22 )    Color: Yellow / Appearance: Clear / S.015 / pH: x  Gluc: x / Ketone: Trace  / Bili: Negative / Urobili: Negative mg/dL   Blood: x / Protein: 30 mg/dL / Nitrite: Negative   Leuk Esterase: Small / RBC: Negative /HPF / WBC 0-2   Sq Epi: x / Non Sq Epi: Occasional / Bacteria: x [facility]                                                                      Francine Velasquez MD                                         Office: [address] [place] [address], [address]                                                 Phone: [phone]; Fax: [phone]                            ==============================================    EPILEPSY INITIAL CONSULT NOTE      ADMITTING DIAGNOSIS: Convulsions      HPI:  This is a 69yo LH female with a history of HTN, HLD, hypothyroid, afib (on apixaban), AS, left breast CA s/p mastectomy and chemo 2010, bilateral lung nodules, spinal stimulator for pain ([employer], unclear if MRI compatible) who presented with epilepsia partialis continua, without prior history of epilepsy.    Pt said she was at home, on couch, compiling a list of some sort. The next thing she came to was waking up on couch, with her nephew standing over her and looking concerned. Pt apparently lost consciousness on the couch. Soon after waking up, she developed involuntary jerking of her LLE > LUE. Jerking persisted until she was admitted to the hospital and given levetiracetam load. Continuous EEG showed near-continuous right hemispheric LPD, c/w focal motor status epilepticus. At time of interview, patient continues to have intermittent, infrequent jerking of LLE and LUE. In retrospect, patient said she has had intermittent left sided twitching for 6-12 months.    There was initially a question of meningitis/encephalitis on presentation, with elevated WBC and AMS, but it was later attributed to reactive leukocytosis and transient encephalopathy secondary to benzo administered to abort status epilepticus.    Except for one alcohol withdrawal years ago, patient denied prior seizure or seizure-like events including staring spells, lapse of time, LOC, involuntary focal jerking/shaking, convulsion, metallic/foul smell or taste, GI rising sensation, intense cara vu, unexplained anxiety or fear. Patient has not seen her oncologist for over 10 yrs. Last saw her pulmonologist 2 yrs ago. Does not recall name of either doctor.    SEIZURE DESCRIPTION AND TYPE:  Type #1  Severity: focal motor seizure + focal motor status epilepticus   Onset: 20, but might have started mid-2019  Quality & associated signs/symptoms: Twitching of LLE > LUE  Duration: few seconds, up to hours  Timing: intermittently left sided twitching since mid-, focal motor status epilepticus 20  Modifying factors: unknown trigger   Diurnal Variation: none    EPILEPSY TYPE: focal epilepsy, unknown etiology  HISTORY OF TONIC-CLONIC SZ: unknown  HISTORY OF STATUS EPILEPTICUS: yes, as focal motor status epilepticus     SEIZURE RISK FACTORS:  H/o left breast cancer s/p mastectomy and chemo. Bilateral lung nodules. Unable to obtain MRI to r/o brain mets. Patient was a product of normal pregnancy and delivery. No history of febrile seizure, TBI, CNS infection, or FH of seizures.    CURRENT AEDs:  levetiracetam 500mg BID - started 20  pregabalin 50mg BID - for neuropathic pain    PREVIOUS AED:  none    IMAGING:   < from: CT Head No Cont (20 @ 23:01) >  IMPRESSION:   The unenhanced brain CT appears nonacute. The current unenhanced brain CT remains nonacute and unchanged compared to the scan done earlier in the day.     < from: CT Head w/wo IV Cont (20 @ 14:19) >  IMPRESSION: No evidence of acute hemorrhage, mass, mass effect effect or abnormal areas of enhancement seen.      NEUROPHYSIOLOGY:  cEEG  - 20 ([facility]): Abnormal EEG in the awake, drowsy and asleep states.  - Near continuous bilateral asymmetric lateralized periodic discharges with rhythmicity (LPD+R) broadly distributed over the right hemisphere, max in the frontal region (max Fp2/F4/Fz >O2/P8).  - Mild to moderate generalized slowing.    _____________________________________________________________  EEG IMPRESSION/CLINICAL CORRELATE    Abnormal EEG study.  1. Potential epileptogenic focus max in the right frontal region.   2. Mild to moderate nonspecific diffuse or multifocal cerebral dysfunction.   3. No seizure seen.      NEUROPSYCHOLOGY:   none    PMH:  S/P insertion of spinal cord stimulator  Lung nodule  Aortic stenosis  Breast CA  OA (osteoarthritis)  Atrial fibrillation  HLD (hyperlipidemia)  HTN (hypertension)      PSH:  H/O left mastectomy  appendecomy  cholecystectomy   spinal cord stimulator placement    MEDICATION:  apixaban 5 milliGRAM(s) Oral two times a day  aspirin  chewable 81 milliGRAM(s) Oral daily  atorvastatin 10 milliGRAM(s) Oral at bedtime  escitalopram 10 milliGRAM(s) Oral daily  flecainide 50 milliGRAM(s) Oral two times a day  levETIRAcetam  IVPB 500 milliGRAM(s) IV Intermittent every 12 hours  levothyroxine 112 MICROGram(s) Oral daily  lidocaine   Patch 1 Patch Transdermal every 24 hours  LORazepam   Injectable 2 milliGRAM(s) IV Push every 5 minutes PRN Seizure  metoprolol succinate ER 25 milliGRAM(s) Oral daily  pregabalin 50 milliGRAM(s) Oral two times a day  tamsulosin 0.4 milliGRAM(s) Oral at bedtime    ALLERGIES:  No Known Allergies    FH:  noncontributory     SH:  H/o EtOH and tobacco. No illicit drugs.    ROS:  Neurology as per HPI, otherwise negative for constitutional, skin, eyes, ENT, respiratory, cardiovascular, gastrointestinal, genitourinary, musculoskeletal, psychiatric, hematology/lymphatics, endocrine, allergic/immunologic.    VITALS:  T(C): 36.7 (20 @ 17:09), Max: 37.1 (20 @ 04:00)  HR: 92 (20 @ 17:09) (80 - 92)  BP: 143/78 (20 @ 17:09) (122/69 - 143/78)  ABP: --  RR: 18 (20 @ 17:09) (17 - 18)  SpO2: 100% (20 @ 17:09) (98% - 100%)  CVP(cm H2O): --    GENERAL PHYSICAL EXAM:  GEN: no distress  HEENT:  NCAT, OP clear  EYES: sclera white, conjunctiva clear, no nystagmus  NECK: supple  CV: RRR, no murmur     		  PULM: CTAB, no wheezing  ABD: soft, +BS, NT  EXT: peripheral pulse intact, no cyanosis  MSK: muscle tone and strength normal  SKIN: warm, dry, no rash or lesion on exposed skin    NEUROLOGICAL EXAM:  Mental Status  Orientation: alert and oriented to person, place, time, and situation   Language: clear and fluent, intact comprehension and repetition, intact naming and reading    Cranial Nerves  II: full visual fields intact   III, IV, VI: PERRL, EOMI  V, VII: facial sensation and movement intact and symmetric   VIII: hearing intact   IX, X: uvula midline, soft palate elevates normally   XI: BL shoulder shrug intact   XII: tongue midline    Motor  5/5 R EXT  4+/5 LUE  3/5 LLE               Tone and bulk are normal in upper and lower limbs  Slight pronator drift in LUE    Sensation  Intact to light touch and pinprick in all 4 EXTs    Reflex  1-2 in BL biceps and patella                                   Plantar responses downward bilaterally    Coordination  Normal FTN bilaterally    Gait  Deferred      LABS:                          12.3   11.15 )-----------( 358      ( 2020 09:13 )             38.4     07-06    138  |  100  |  10.0  ----------------------------<  89  3.5   |  25.0  |  0.64    Ca    9.1      2020 09:13  Phos  2.5     07-06  Mg     2.1     07-06      CAPILLARY BLOOD GLUCOSE          PT/INR - ( 2020 06:21 )   PT: 14.2 sec;   INR: 1.24 ratio           Urinalysis Basic - ( 2020 21:22 )    Color: Yellow / Appearance: Clear / S.015 / pH: x  Gluc: x / Ketone: Trace  / Bili: Negative / Urobili: Negative mg/dL   Blood: x / Protein: 30 mg/dL / Nitrite: Negative   Leuk Esterase: Small / RBC: Negative /HPF / WBC 0-2   Sq Epi: x / Non Sq Epi: Occasional / Bacteria: x

## 2020-07-06 NOTE — PHYSICAL THERAPY INITIAL EVALUATION ADULT - ADDITIONAL COMMENTS
Pt. lives alone in an apartment with no STAS and no stairs inside. Pt. reports she was independent PTA and does not own any DME.

## 2020-07-07 NOTE — PROGRESS NOTE ADULT - SUBJECTIVE AND OBJECTIVE BOX
[facility]                                                                      Francine Velasquez MD                                         Office: [facility] [address], [address]                                                 Phone: [phone]; Fax: [phone]                            ==============================================    EPILEPSY FOLLOW-UP NOTE      INTERVAL HISTORY:  Twitching of the left EXTs resolved overnight after administration of lacosamide. EEG also looks a lot better this morning.      MEDICATIONS:   apixaban 5 milliGRAM(s) Oral two times a day  aspirin  chewable 81 milliGRAM(s) Oral daily  atorvastatin 10 milliGRAM(s) Oral at bedtime  escitalopram 10 milliGRAM(s) Oral daily  flecainide 50 milliGRAM(s) Oral two times a day  lacosamide 100 milliGRAM(s) Oral two times a day  levETIRAcetam  IVPB 1000 milliGRAM(s) IV Intermittent every 12 hours  levothyroxine 112 MICROGram(s) Oral daily  lidocaine   Patch 1 Patch Transdermal every 24 hours  LORazepam   Injectable 2 milliGRAM(s) IV Push every 5 minutes PRN Seizure  metoprolol succinate ER 25 milliGRAM(s) Oral daily  pregabalin 50 milliGRAM(s) Oral two times a day  tamsulosin 0.4 milliGRAM(s) Oral at bedtime    LAST 24-HR VITALS:  T(C): 36.5 (07-07-20 @ 05:08), Max: 36.7 (07-06-20 @ 17:09)  HR: 83 (07-07-20 @ 05:08) (83 - 99)  BP: 123/75 (07-07-20 @ 05:08) (123/75 - 144/73)  ABP: --  RR: 18 (07-07-20 @ 05:08) (18 - 19)  SpO2: 99% (07-07-20 @ 05:08) (99% - 100%)  CVP(cm H2O): --      GENERAL PHYSICAL EXAM:  GEN: no distress  HEENT:  NCAT, OP clear  EYES: sclera white, conjunctiva clear, no nystagmus  NECK: supple  CV: RRR, no murmur     		  PULM: CTAB, no wheezing  ABD: soft, +BS, NT  EXT: peripheral pulse intact, no cyanosis  MSK: muscle tone and strength normal  SKIN: warm, dry, no rash or lesion on exposed skin    NEUROLOGICAL EXAM:  Mental Status  Orientation: alert and oriented to person, place, time, and situation   Language: clear and fluent, intact comprehension and repetition, intact naming and reading    Cranial Nerves  II: full visual fields intact   III, IV, VI: PERRL, EOMI  V, VII: facial sensation and movement intact and symmetric   VIII: hearing intact   IX, X: uvula midline, soft palate elevates normally   XI: BL shoulder shrug intact   XII: tongue midline    Motor  5/5 R EXT  4+/5 LUE  4-/5 LLE               Tone and bulk are normal in upper and lower limbs    Sensation  Intact to light touch and pinprick in all 4 EXTs    Reflex  2+ in BL biceps and patella                                   Plantar responses mute bilaterally    Coordination  Normal FTN bilaterally    Gait  Deferred    LABS:                          12.3   11.15 )-----------( 358      ( 06 Jul 2020 09:13 )             38.4     07-06    138  |  100  |  10.0  ----------------------------<  89  3.5   |  25.0  |  0.64    Ca    9.1      06 Jul 2020 09:13  Phos  2.5     07-06  Mg     2.1     07-06      OTHER IMAGING AND STUDIES:    < from: CT Head No Cont (07.04.20 @ 23:01) >  IMPRESSION:   The unenhanced brain CT appears nonacute. The current unenhanced brain CT remains nonacute and unchanged compared to the scan done earlier in the day.     < from: CT Head w/wo IV Cont (06.16.20 @ 14:19) >  IMPRESSION: No evidence of acute hemorrhage, mass, mass effect effect or abnormal areas of enhancement seen.        NEUROPHYSIOLOGY:    cEEG 7/5 - 7/7/20:   EEG SUMMARY/CLASSIFICATION    Abnormal EEG in the awake, drowsy and asleep states.  - Intermittent twitching of the left upper and lower extremities noted at bedside, correlating to continuous right hemispheric LPD as described above, consistent with focal motor seizure of right frontal onset.  - Near continuous bilateral asymmetric lateralized periodic discharges with rhythmicity (LPD+R) broadly distributed over the right hemisphere, max in the frontal region (max Fp2/F4/Fz >O2/P8).  - Mild to moderate generalized slowing.    _____________________________________________________________  EEG IMPRESSION/CLINICAL CORRELATE    Abnormal EEG study.  1. Intermittent focal motor seizure of right frontal onset, characterized by subtle twitching of the left upper and lower extremities.  2. Mild to moderate nonspecific diffuse or multifocal cerebral dysfunction. [facility]                                                                      Francine Velasquez MD                                         Office: [facility] [place], [address]                                                 Phone: [phone]; Fax: [phone]                            ==============================================    EPILEPSY FOLLOW-UP NOTE      INTERVAL HISTORY:  Twitching of the left EXTs resolved overnight after administration of lacosamide. EEG also looks a lot better this morning.      MEDICATIONS:   apixaban 5 milliGRAM(s) Oral two times a day  aspirin  chewable 81 milliGRAM(s) Oral daily  atorvastatin 10 milliGRAM(s) Oral at bedtime  escitalopram 10 milliGRAM(s) Oral daily  flecainide 50 milliGRAM(s) Oral two times a day  lacosamide 100 milliGRAM(s) Oral two times a day  levETIRAcetam  IVPB 1000 milliGRAM(s) IV Intermittent every 12 hours  levothyroxine 112 MICROGram(s) Oral daily  lidocaine   Patch 1 Patch Transdermal every 24 hours  LORazepam   Injectable 2 milliGRAM(s) IV Push every 5 minutes PRN Seizure  metoprolol succinate ER 25 milliGRAM(s) Oral daily  pregabalin 50 milliGRAM(s) Oral two times a day  tamsulosin 0.4 milliGRAM(s) Oral at bedtime    LAST 24-HR VITALS:  T(C): 36.5 (07-07-20 @ 05:08), Max: 36.7 (07-06-20 @ 17:09)  HR: 83 (07-07-20 @ 05:08) (83 - 99)  BP: 123/75 (07-07-20 @ 05:08) (123/75 - 144/73)  ABP: --  RR: 18 (07-07-20 @ 05:08) (18 - 19)  SpO2: 99% (07-07-20 @ 05:08) (99% - 100%)  CVP(cm H2O): --      GENERAL PHYSICAL EXAM:  GEN: no distress  HEENT:  NCAT, OP clear  EYES: sclera white, conjunctiva clear, no nystagmus  NECK: supple  CV: RRR, no murmur     		  PULM: CTAB, no wheezing  ABD: soft, +BS, NT  EXT: peripheral pulse intact, no cyanosis  MSK: muscle tone and strength normal  SKIN: warm, dry, no rash or lesion on exposed skin    NEUROLOGICAL EXAM:  Mental Status  Orientation: alert and oriented to person, place, time, and situation   Language: clear and fluent, intact comprehension and repetition, intact naming and reading    Cranial Nerves  II: full visual fields intact   III, IV, VI: PERRL, EOMI  V, VII: facial sensation and movement intact and symmetric   VIII: hearing intact   IX, X: uvula midline, soft palate elevates normally   XI: BL shoulder shrug intact   XII: tongue midline    Motor  5/5 R EXT  4+/5 LUE  4-/5 LLE               Tone and bulk are normal in upper and lower limbs    Sensation  Intact to light touch and pinprick in all 4 EXTs    Reflex  1-2 in BL biceps and patella                                   Plantar responses mute bilaterally    Coordination  Normal FTN bilaterally    Gait  Deferred    LABS:                          12.3   11.15 )-----------( 358      ( 06 Jul 2020 09:13 )             38.4     07-06    138  |  100  |  10.0  ----------------------------<  89  3.5   |  25.0  |  0.64    Ca    9.1      06 Jul 2020 09:13  Phos  2.5     07-06  Mg     2.1     07-06      OTHER IMAGING AND STUDIES:    < from: CT Head No Cont (07.04.20 @ 23:01) >  IMPRESSION:   The unenhanced brain CT appears nonacute. The current unenhanced brain CT remains nonacute and unchanged compared to the scan done earlier in the day.     < from: CT Head w/wo IV Cont (06.16.20 @ 14:19) >  IMPRESSION: No evidence of acute hemorrhage, mass, mass effect effect or abnormal areas of enhancement seen.        NEUROPHYSIOLOGY:    cEEG 7/5 - 7/7/20:   EEG SUMMARY/CLASSIFICATION    Abnormal EEG in the awake, drowsy and asleep states.  - Intermittent twitching of the left upper and lower extremities noted at bedside, correlating to continuous right hemispheric LPD as described above, consistent with focal motor seizure of right frontal onset.  - Near continuous bilateral asymmetric lateralized periodic discharges with rhythmicity (LPD+R) broadly distributed over the right hemisphere, max in the frontal region (max Fp2/F4/Fz >O2/P8).  - Mild to moderate generalized slowing.    _____________________________________________________________  EEG IMPRESSION/CLINICAL CORRELATE    Abnormal EEG study.  1. Intermittent focal motor seizure of right frontal onset, characterized by subtle twitching of the left upper and lower extremities.  2. Mild to moderate nonspecific diffuse or multifocal cerebral dysfunction. [facility]                                                                      Francine Velasquez MD                                         Office: [facility] [place], [place], [address]                                                 Phone: [phone]; Fax: [phone]                            ==============================================    EPILEPSY FOLLOW-UP NOTE      INTERVAL HISTORY:  Twitching of the left EXTs resolved overnight after administration of lacosamide. EEG also looks a lot better this morning.      MEDICATIONS:   apixaban 5 milliGRAM(s) Oral two times a day  aspirin  chewable 81 milliGRAM(s) Oral daily  atorvastatin 10 milliGRAM(s) Oral at bedtime  escitalopram 10 milliGRAM(s) Oral daily  flecainide 50 milliGRAM(s) Oral two times a day  lacosamide 100 milliGRAM(s) Oral two times a day  levETIRAcetam  IVPB 1000 milliGRAM(s) IV Intermittent every 12 hours  levothyroxine 112 MICROGram(s) Oral daily  lidocaine   Patch 1 Patch Transdermal every 24 hours  LORazepam   Injectable 2 milliGRAM(s) IV Push every 5 minutes PRN Seizure  metoprolol succinate ER 25 milliGRAM(s) Oral daily  pregabalin 50 milliGRAM(s) Oral two times a day  tamsulosin 0.4 milliGRAM(s) Oral at bedtime    LAST 24-HR VITALS:  T(C): 36.5 (07-07-20 @ 05:08), Max: 36.7 (07-06-20 @ 17:09)  HR: 83 (07-07-20 @ 05:08) (83 - 99)  BP: 123/75 (07-07-20 @ 05:08) (123/75 - 144/73)  ABP: --  RR: 18 (07-07-20 @ 05:08) (18 - 19)  SpO2: 99% (07-07-20 @ 05:08) (99% - 100%)  CVP(cm H2O): --      GENERAL PHYSICAL EXAM:  GEN: no distress  HEENT:  NCAT, OP clear  EYES: sclera white, conjunctiva clear, no nystagmus  NECK: supple  CV: RRR, no murmur     		  PULM: CTAB, no wheezing  ABD: soft, +BS, NT  EXT: peripheral pulse intact, no cyanosis  MSK: muscle tone and strength normal  SKIN: warm, dry, no rash or lesion on exposed skin    NEUROLOGICAL EXAM:  Mental Status  Orientation: alert and oriented to person, place, time, and situation   Language: mild dysarthria, intact comprehension and repetition, intact naming and reading    Cranial Nerves  II: full visual fields intact   III, IV, VI: PERRL, EOMI  V, VII: facial sensation intact, BL masseter atrophy  VIII: hearing intact   IX, X: uvula midline, soft palate elevates normally   XI: BL shoulder shrug intact   XII: tongue midline, no fasciculation seen    Motor  5/5 R EXT  4+/5 LUE  4-/5 LLE   Tone and bulk are normal in upper and lower limbs  no fasciculation seen    Sensation  Intact to light touch and pinprick in all 4 EXTs    Reflex  2+ in BL biceps and R patella  0 in L patella                                   Plantar responses mute bilaterally    Coordination  Normal FTN bilaterally    Gait  Deferred    LABS:                          12.3   11.15 )-----------( 358      ( 06 Jul 2020 09:13 )             38.4     07-06    138  |  100  |  10.0  ----------------------------<  89  3.5   |  25.0  |  0.64    Ca    9.1      06 Jul 2020 09:13  Phos  2.5     07-06  Mg     2.1     07-06      OTHER IMAGING AND STUDIES:    < from: CT Head No Cont (07.04.20 @ 23:01) >  IMPRESSION:   The unenhanced brain CT appears nonacute. The current unenhanced brain CT remains nonacute and unchanged compared to the scan done earlier in the day.     < from: CT Head w/wo IV Cont (06.16.20 @ 14:19) >  IMPRESSION: No evidence of acute hemorrhage, mass, mass effect effect or abnormal areas of enhancement seen.        NEUROPHYSIOLOGY:    cEEG 7/5 - 7/7/20:   EEG SUMMARY/CLASSIFICATION    Abnormal EEG in the awake, drowsy and asleep states.  - Intermittent twitching of the left upper and lower extremities noted at bedside, correlating to continuous right hemispheric LPD as described above, consistent with focal motor seizure of right frontal onset.  - Near continuous bilateral asymmetric lateralized periodic discharges with rhythmicity (LPD+R) broadly distributed over the right hemisphere, max in the frontal region (max Fp2/F4/Fz >O2/P8).  - Mild to moderate generalized slowing.    _____________________________________________________________  EEG IMPRESSION/CLINICAL CORRELATE    Abnormal EEG study.  1. Intermittent focal motor seizure of right frontal onset, characterized by subtle twitching of the left upper and lower extremities.  2. Mild to moderate nonspecific diffuse or multifocal cerebral dysfunction.

## 2020-07-07 NOTE — PROGRESS NOTE ADULT - ASSESSMENT
71 y/o female pmhx HTN, HLD, hypothyroid, Afib on Eliquis, AS, breast cancer( in remission), left lung nodule, spinal stimulator presented to ED   for LUE twitching/ tremor. Patient had RUE twitching/ Tremor subsequently developing tonic-clonic seizure  lasting about 1 minute.   Admitted to ICU for airway monitoring given post ictal state and encephelopathy. Son last night reported that she has had a 50lb unintentional weight loss over the past year and has experienced "chela and episodes of drooling". Started on iv keppra  , seen by neurologist , EEG ordered Pt is with intermittent left leg twitching  , video EEG ordered ,   Ct of head x2 unremarkable      1- seizure partial with abnormal  EEG   neurology follow up appreciated   cont current antiepileptic therapy   [facility]  contacted , pt is not candidate for MR due to multiple stimulator implanted in the past   cont EEG monitoring   CT of spine ordered to r/o spine pathology causing left leg numbness / weakness ?     2- H/o Atrial fib paroxysmal now in NSR   cont to monitor on vimpat added yesterday for recurrent at fib   stable   cont anticoagulation and metoprolol   resume all meds     3- h/o breast cancer / mastectomy in the past   reports having breast mammogram  last year 'had not seen her oncologist   rec to follow with  oncology  after discharge     4- Hypothyroidsm-  on levothyroxine     5- HTN   controlled     discharge RONALD likely once stable   d/w son on the phone yesterday

## 2020-07-07 NOTE — EEG REPORT - NS EEG TEXT BOX
[facility]   COMPREHENSIVE EPILEPSY CENTER   REPORT OF LONG-TERM VIDEO EEG     [facility]: [address], [place] [address], Ph#: [phone]  [facility]: [id number], [place] [address], Ph#: [phone]  [facility]: [address] [place] [address], Ph#: [phone]    Patient Name: SKYLER OSUNA  Age and : 70y (54)  MRN #: [id number]  Location: [address] [address]  Referring Physician: Raquel Schulz    Start Time/Date: 13:41 on 20  End Time/Date: 23:29 on 20  Duration: nearly 10hr    _____________________________________________________________  STUDY INFORMATION    EEG Recording Technique:  The patient underwent continuous Video-EEG monitoring, using Telemetry System hardware on the XLTek Digital System. EEG and video data were stored on a computer hard drive with important events saved in digital archive files. The material was reviewed by a physician (electroencephalographer / epileptologist) on a daily basis. Andrew and seizure detection algorithms were utilized and reviewed. An EEG Technician attended to the patient, and was available throughout daytime work hours.  The epilepsy center neurologist was available in person or on call 24-hours per day.    EEG Placement and Labeling of Electrodes:  The EEG was performed utilizing 20 channel referential EEG connections (coronal over temporal over parasagittal montage) using all standard 10-20 electrode placements with EKG, with additional electrodes placed in the inferior temporal region using the modified 10-10 montage electrode placements for elective admissions, or if deemed necessary. Recording was at a sampling rate of 256 samples per second per channel. Time synchronized digital video recording was done simultaneously with EEG recording. A low light infrared camera was used for low light recording.     _____________________________________________________________  HISTORY    Patient is a 70y old  Female who presents with a chief complaint of Status epilepticus (2020 14:16)      PERTINENT MEDICATION:  levETIRAcetam  IVPB 500 milliGRAM(s) IV Intermittent every 12 hours    _____________________________________________________________  STUDY INTERPRETATION    Findings: The background was continuous, spontaneously variable and reactive. During wakefulness, poorly-modulated 7 Hz posterior dominant rhythm with amplitude to 30 uV, better seen over the left hemisphere.    Background Slowing:  Background predominantly consisted of theta, delta and faster activities.    Focal Slowing:   None were present.    Sleep Background:  Drowsiness was characterized by fragmentation, attenuation, and slowing of the background activity.    Sleep was characterized by the presence of asymmetric sleep spindles and K-complexes, better formed over the left hemisphere.    Other Non-Epileptiform Findings:  None were present.    Interictal Epileptiform Activity:   Near continuous bilateral asymmetric lateralized periodic discharges with rhythmicity (LPD+R) broadly distributed over the right hemisphere, max in the frontal region (max Fp2/F4/Fz >O2/P8).    Events:  Intermittent twitching of the left upper and lower extremities noted at bedside, correlating to continuous right hemispheric LPD as described above, consistent with focal motor seizure of right frontal onset.    Activation Procedures:   Hyperventilation was not performed.    Photic stimulation was not performed.     Artifacts:  Intermittent myogenic and movement artifacts were noted.    _____________________________________________________________  EEG SUMMARY/CLASSIFICATION    Abnormal EEG in the awake, drowsy and asleep states.  - Intermittent twitching of the left upper and lower extremities noted at bedside, correlating to continuous right hemispheric LPD as described above, consistent with focal motor seizure of right frontal onset.  - Near continuous bilateral asymmetric lateralized periodic discharges with rhythmicity (LPD+R) broadly distributed over the right hemisphere, max in the frontal region (max Fp2/F4/Fz >O2/P8).  - Mild to moderate generalized slowing.    _____________________________________________________________  EEG IMPRESSION/CLINICAL CORRELATE    Abnormal EEG study.  1. Intermittent focal motor seizure of right frontal onset, characterized by subtle twitching of the left upper and lower extremities.  2. Mild to moderate nonspecific diffuse or multifocal cerebral dysfunction.     _____________________________________________________________    Francine Velasquez MD  Attending Physician, [facility]   COMPREHENSIVE EPILEPSY CENTER   REPORT OF LONG-TERM VIDEO EEG     [facility]: [address], [place] [address], Ph#: [phone]  [facility]: [id number], [place] [address], Ph#: [phone]  [facility]: [address] [place] [address], Ph#: [phone]    Patient Name: SKYLER OSUNA  Age and : 70y (54)  MRN #: [id number]  Location: [address] [address]  Referring Physician: Raquel Schulz    Start Time/Date: 13:41 on 20  End Time/Date: 23:29 on 20  Duration: nearly 10hr    _____________________________________________________________  STUDY INFORMATION    EEG Recording Technique:  The patient underwent continuous Video-EEG monitoring, using Telemetry System hardware on the XLTek Digital System. EEG and video data were stored on a computer hard drive with important events saved in digital archive files. The material was reviewed by a physician (electroencephalographer / epileptologist) on a daily basis. Andrew and seizure detection algorithms were utilized and reviewed. An EEG Technician attended to the patient, and was available throughout daytime work hours.  The epilepsy center neurologist was available in person or on call 24-hours per day.    EEG Placement and Labeling of Electrodes:  The EEG was performed utilizing 20 channel referential EEG connections (coronal over temporal over parasagittal montage) using all standard 10-20 electrode placements with EKG, with additional electrodes placed in the inferior temporal region using the modified 10-10 montage electrode placements for elective admissions, or if deemed necessary. Recording was at a sampling rate of 256 samples per second per channel. Time synchronized digital video recording was done simultaneously with EEG recording. A low light infrared camera was used for low light recording.     _____________________________________________________________  HISTORY    Patient is a 70y old  Female who presents with a chief complaint of Status epilepticus (2020 14:16)      PERTINENT MEDICATION:  levETIRAcetam  IVPB 500 milliGRAM(s) IV Intermittent every 12 hours    _____________________________________________________________  STUDY INTERPRETATION    Findings: The background was continuous, spontaneously variable and reactive. During wakefulness, poorly-modulated 7 Hz posterior dominant rhythm with amplitude to 30 uV, better seen over the left hemisphere.    Background Slowing:  Background predominantly consisted of theta, delta and faster activities.    Focal Slowing:   None were present.    Sleep Background:  Drowsiness was characterized by fragmentation, attenuation, and slowing of the background activity.    Sleep was characterized by the presence of asymmetric sleep spindles and K-complexes, better formed over the left hemisphere.    Other Non-Epileptiform Findings:  None were present.    Interictal Epileptiform Activity:   Near continuous fluctuating 1-2 Hz bilateral asymmetric lateralized periodic discharges with rhythmicity (LPD+R) broadly distributed over the right hemisphere, max in the frontal region (max Fp2/F4/Fz >O2/P8).    Events:  Intermittent twitching of the left upper and lower extremities noted at bedside, correlating to continuous right hemispheric LPD as described above, consistent with focal motor seizure of right frontal onset.    Activation Procedures:   Hyperventilation was not performed.    Photic stimulation was not performed.     Artifacts:  Intermittent myogenic and movement artifacts were noted.    _____________________________________________________________  EEG SUMMARY/CLASSIFICATION    Abnormal EEG in the awake, drowsy and asleep states.  - Intermittent twitching of the left upper and lower extremities noted at bedside, correlating to continuous right hemispheric LPD as described above, consistent with focal motor seizure of right frontal onset.  - Near continuous fluctuating 1-2 Hz bilateral asymmetric lateralized periodic discharges with rhythmicity (LPD+R) broadly distributed over the right hemisphere, max in the frontal region (max Fp2/F4/Fz >O2/P8).  - Mild to moderate generalized slowing.    _____________________________________________________________  EEG IMPRESSION/CLINICAL CORRELATE    Abnormal EEG study.  1. Intermittent focal motor seizure of right frontal onset, characterized by subtle twitching of the left upper and lower extremities.  2. Mild to moderate nonspecific diffuse or multifocal cerebral dysfunction.     _____________________________________________________________    Francine Velasquez MD  Attending Physician, [facility] [place]

## 2020-07-07 NOTE — PROGRESS NOTE ADULT - ASSESSMENT
71yo LH female with a history of HTN, HLD, hypothyroid, afib (on apixaban), AS, left breast CA s/p mastectomy and chemo 2010, bilateral lung nodules, spinal stimulator for pain ([employer] [facility], unclear if MRI compatible) who presented with epilepsia partialis continua, without prior history of epilepsy.      Impression:  1. 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 Most concerning for brain mets given h/o breast CA and lung nodules, with recent 50lb wt loss. However, unable to obtain MRI due to spinal stimulator.     2. Left EXT weakness: Likely postictal paresis. CT T-L unremarkable. No UMN signs on exam.      Recommendation:  - transfer to EMU in 6T for cvEEG with continue monitoring   - convert levetiracetam IV 1000mg BID to PO (ordered)  - continue lacosamide 100mg BID   - pending CT w/ contrast A/C/P/H to r/o malignancy   - monitor left EXT weakness  - lorazepam 2mg IV prn convulsion (grand mal seizure)  - seizure precaution  - needs to resume follow-up with oncology and pulmonology  - follow-up with me in clinic: [facility] [place] ([phone]), [address] [place] [address]       Will continue to follow with you.   ______________________  Francine Velasquez MD   [facility]psy  Cell: [phone]

## 2020-07-07 NOTE — PROGRESS NOTE ADULT - SUBJECTIVE AND OBJECTIVE BOX
Pt is for tx to medical service from ICU for seizure disorder   pt is seen earlier today  , she is on EEG monitoring at present   could not complete CT scans yesterday due to agitation anxiety apparently   she states that feels better today , + numbness of leg on the left         Vital Signs Last 24 Hrs  T(C): 36.5 (07 Jul 2020 05:08), Max: 36.7 (06 Jul 2020 17:09)  T(F): 97.7 (07 Jul 2020 05:08), Max: 98.1 (06 Jul 2020 17:09)  HR: 83 (07 Jul 2020 05:08) (83 - 99)  BP: 123/75 (07 Jul 2020 05:08) (123/75 - 144/73)  BP(mean): 91 (07 Jul 2020 05:08) (91 - 93)  RR: 18 (07 Jul 2020 05:08) (18 - 19)  SpO2: 99% (07 Jul 2020 05:08) (99% - 100%)    GENERAL: NAD, looks comfortable   CHEST/LUNG: Clear to auscultation bilaterally; No rales, rhonchi  HEART: Regular rate and rhythm; s1/s2 ; No murmurs, rubs, or gallops  ABDOMEN: Soft, Nontender, Nondistended; Bowel sounds present  EXTREMITIES:  No clubbing, cyanosis, or edema   Skin : normal color warm dry                                           12.3   11.15 )-----------( 358      ( 06 Jul 2020 09:13 )             38.4   07-06    138  |  100  |  10.0  ----------------------------<  89  3.5   |  25.0  |  0.64    Ca    9.1      06 Jul 2020 09:13  Phos  2.5     07-06  Mg     2.1     07-06

## 2020-07-08 NOTE — PROGRESS NOTE ADULT - SUBJECTIVE AND OBJECTIVE BOX
[facility]                                                                      Francine Velasquez MD                                         Office: [address] [place] [address], [address]                                                 Phone: [phone]; Fax: [phone]                            ==============================================    EPILEPSY FOLLOW-UP NOTE      INTERVAL HISTORY:  Patient feels left EXT weakness much improved today. No more focal motor seizure. Patient said the numbness in BL toes has been there for years, then BL facial numbness within 1 year, and now BLE numbness and achy pain within last few months. Has been multiple different doctors. Spinal cord stimulator implanted without much improvement.       MEDICATIONS  (STANDING):  apixaban 5 milliGRAM(s) Oral two times a day  aspirin  chewable 81 milliGRAM(s) Oral daily  atorvastatin 10 milliGRAM(s) Oral at bedtime  escitalopram 10 milliGRAM(s) Oral daily  flecainide 50 milliGRAM(s) Oral two times a day  lacosamide 100 milliGRAM(s) Oral two times a day  levETIRAcetam 1000 milliGRAM(s) Oral two times a day  levothyroxine 112 MICROGram(s) Oral daily  lidocaine   Patch 1 Patch Transdermal every 24 hours  metoprolol succinate ER 25 milliGRAM(s) Oral daily  pregabalin 50 milliGRAM(s) Oral two times a day  tamsulosin 0.4 milliGRAM(s) Oral at bedtime    MEDICATIONS  (PRN):  LORazepam   Injectable 2 milliGRAM(s) IV Push every 5 minutes PRN Seizure      Vital Signs Last 24 Hrs  T(C): 36.9 (07 Jul 2020 22:17), Max: 36.9 (07 Jul 2020 22:17)  T(F): 98.4 (07 Jul 2020 22:17), Max: 98.4 (07 Jul 2020 22:17)  HR: 75 (08 Jul 2020 05:48) (65 - 82)  BP: 96/61 (08 Jul 2020 05:48) (96/61 - 118/64)  BP(mean): --  RR: 18 (08 Jul 2020 05:48) (18 - 18)  SpO2: 88% (08 Jul 2020 05:48) (88% - 97%)      GENERAL PHYSICAL EXAM:  GEN: no distress  HEENT:  NCAT, OP clear  EYES: sclera white, conjunctiva clear, no nystagmus  NECK: supple  CV: RRR, no murmur     		  PULM: CTAB, no wheezing  ABD: soft, +BS, NT  EXT: peripheral pulse intact, no cyanosis  MSK: muscle tone and strength normal  SKIN: warm, dry, no rash or lesion on exposed skin    NEUROLOGICAL EXAM:  Mental Status  Orientation: alert and oriented to person, place, time, and situation   Language: clear and fluent, intact comprehension and repetition, intact naming and reading    Cranial Nerves  II: full visual fields intact   III, IV, VI: PERRL, EOMI  V, VII: facial sensation and movement intact and symmetric   VIII: hearing intact   IX, X: uvula midline, soft palate elevates normally   XI: BL shoulder shrug intact   XII: tongue midline    Motor  5/5 R EXT  4+/5 L EXT             Tone and bulk are normal in upper and lower limbs    Sensation  Intact to light touch and pinprick in all 4 EXTs    Reflex  2+ in BL biceps and patella                                   Plantar responses mute bilaterally    Coordination  Normal FTN bilaterally    Gait  Deferred      LABS:                          11.4   7.96  )-----------( 313      ( 08 Jul 2020 06:24 )             35.7     07-08    139  |  102  |  16.0  ----------------------------<  106<H>  4.0   |  26.0  |  0.57    Ca    8.8      08 Jul 2020 06:24  Phos  3.4     07-08  Mg     1.9     07-08 07-07-20 @ 07:01  -  07-08-20 @ 07:00  --------------------------------------------------------  IN: 0 mL / OUT: 875 mL / NET: -875 mL            OTHER IMAGING AND STUDIES:    < from: CT Head No Cont (07.04.20 @ 23:01) >  IMPRESSION:   The unenhanced brain CT appears nonacute. The current unenhanced brain CT remains nonacute and unchanged compared to the scan done earlier in the day.     < from: CT Head w/wo IV Cont (06.16.20 @ 14:19) >  IMPRESSION: No evidence of acute hemorrhage, mass, mass effect effect or abnormal areas of enhancement seen.        NEUROPHYSIOLOGY:    cEEG 7/7 - 7/8/20:   Abnormal EEG in the awake, drowsy and asleep states.  - Near continuous 1-1.5 Hz bilateral asymmetric lateralized periodic discharges with rhythmicity (LPD+R) broadly distributed over the right hemisphere, max in the frontal region (max Fp2/F4/Fz).  - Mild to moderate generalized slowing.    _____________________________________________________________  EEG IMPRESSION/CLINICAL CORRELATE    Abnormal EEG study.  1. Potential epileptogenic focus in the right frontal region.   2. Mild to moderate nonspecific diffuse or multifocal cerebral dysfunction.   3. No seizure seen.      cEEG 7/5 - 7/7/20:   EEG SUMMARY/CLASSIFICATION    Abnormal EEG in the awake, drowsy and asleep states.  - Intermittent twitching of the left upper and lower extremities noted at bedside, correlating to continuous right hemispheric LPD as described above, consistent with focal motor seizure of right frontal onset.  - Near continuous fluctuating 1-2 Hz bilateral asymmetric lateralized periodic discharges with rhythmicity (LPD+R) broadly distributed over the right hemisphere, max in the frontal region (max Fp2/F4/Fz >O2/P8).  - Mild to moderate generalized slowing.    _____________________________________________________________  EEG IMPRESSION/CLINICAL CORRELATE    Abnormal EEG study.  1. Intermittent focal motor seizure of right frontal onset, characterized by subtle twitching of the left upper and lower extremities.  2. Mild to moderate nonspecific diffuse or multifocal cerebral dysfunction. [facility]                                                                      Francine Velasquez MD                                         Office: [address] [place] [address], [address]                                                 Phone: [phone]; Fax: [phone]                            ==============================================    EPILEPSY FOLLOW-UP NOTE      INTERVAL HISTORY:  Patient feels left EXT weakness much improved today. No more focal motor seizure. Patient said the numbness in BL toes has been there for years, then BL facial numbness within 1 year, and now BLE numbness and achy pain within last few months. Has been multiple different doctors. Spinal cord stimulator implanted without much improvement.       MEDICATIONS  (STANDING):  apixaban 5 milliGRAM(s) Oral two times a day  aspirin  chewable 81 milliGRAM(s) Oral daily  atorvastatin 10 milliGRAM(s) Oral at bedtime  escitalopram 10 milliGRAM(s) Oral daily  flecainide 50 milliGRAM(s) Oral two times a day  lacosamide 100 milliGRAM(s) Oral two times a day  levETIRAcetam 1000 milliGRAM(s) Oral two times a day  levothyroxine 112 MICROGram(s) Oral daily  lidocaine   Patch 1 Patch Transdermal every 24 hours  metoprolol succinate ER 25 milliGRAM(s) Oral daily  pregabalin 50 milliGRAM(s) Oral two times a day  tamsulosin 0.4 milliGRAM(s) Oral at bedtime    MEDICATIONS  (PRN):  LORazepam   Injectable 2 milliGRAM(s) IV Push every 5 minutes PRN Seizure      Vital Signs Last 24 Hrs  T(C): 36.9 (07 Jul 2020 22:17), Max: 36.9 (07 Jul 2020 22:17)  T(F): 98.4 (07 Jul 2020 22:17), Max: 98.4 (07 Jul 2020 22:17)  HR: 75 (08 Jul 2020 05:48) (65 - 82)  BP: 96/61 (08 Jul 2020 05:48) (96/61 - 118/64)  BP(mean): --  RR: 18 (08 Jul 2020 05:48) (18 - 18)  SpO2: 88% (08 Jul 2020 05:48) (88% - 97%)      GENERAL PHYSICAL EXAM:  GEN: no distress  HEENT:  NCAT, OP clear  EYES: sclera white, conjunctiva clear, no nystagmus  NECK: supple  CV: RRR, no murmur     		  PULM: CTAB, no wheezing  ABD: soft, +BS, NT  EXT: peripheral pulse intact, no cyanosis  MSK: muscle tone and strength normal  SKIN: warm, dry, no rash or lesion on exposed skin    NEUROLOGICAL EXAM:  Mental Status  Orientation: alert and oriented to person, place, time, and situation   Language: clear and fluent, intact comprehension and repetition, intact naming and reading    Cranial Nerves  II: full visual fields intact   III, IV, VI: PERRL, EOMI  V, VII: facial sensation and movement intact and symmetric   VIII: hearing intact   IX, X: uvula midline, soft palate elevates normally   XI: BL shoulder shrug intact   XII: tongue midline    Motor  5/5 R EXT  4+/5 L EXT             Tone and bulk are normal in upper and lower limbs    Sensation  Intact to light touch and pinprick in all 4 EXTs    Reflex  1-2 in BL biceps and patella                                   Plantar responses mute bilaterally    Coordination  Normal FTN bilaterally    Gait  Deferred      LABS:                          11.4   7.96  )-----------( 313      ( 08 Jul 2020 06:24 )             35.7     07-08    139  |  102  |  16.0  ----------------------------<  106<H>  4.0   |  26.0  |  0.57    Ca    8.8      08 Jul 2020 06:24  Phos  3.4     07-08  Mg     1.9     07-08 07-07-20 @ 07:01  -  07-08-20 @ 07:00  --------------------------------------------------------  IN: 0 mL / OUT: 875 mL / NET: -875 mL            OTHER IMAGING AND STUDIES:    < from: CT Head No Cont (07.04.20 @ 23:01) >  IMPRESSION:   The unenhanced brain CT appears nonacute. The current unenhanced brain CT remains nonacute and unchanged compared to the scan done earlier in the day.     < from: CT Head w/wo IV Cont (06.16.20 @ 14:19) >  IMPRESSION: No evidence of acute hemorrhage, mass, mass effect effect or abnormal areas of enhancement seen.        NEUROPHYSIOLOGY:    cEEG 7/7 - 7/8/20:   Abnormal EEG in the awake, drowsy and asleep states.  - Near continuous 1-1.5 Hz bilateral asymmetric lateralized periodic discharges with rhythmicity (LPD+R) broadly distributed over the right hemisphere, max in the frontal region (max Fp2/F4/Fz).  - Mild to moderate generalized slowing.    _____________________________________________________________  EEG IMPRESSION/CLINICAL CORRELATE    Abnormal EEG study.  1. Potential epileptogenic focus in the right frontal region.   2. Mild to moderate nonspecific diffuse or multifocal cerebral dysfunction.   3. No seizure seen.      cEEG 7/5 - 7/7/20:   EEG SUMMARY/CLASSIFICATION    Abnormal EEG in the awake, drowsy and asleep states.  - Intermittent twitching of the left upper and lower extremities noted at bedside, correlating to continuous right hemispheric LPD as described above, consistent with focal motor seizure of right frontal onset.  - Near continuous fluctuating 1-2 Hz bilateral asymmetric lateralized periodic discharges with rhythmicity (LPD+R) broadly distributed over the right hemisphere, max in the frontal region (max Fp2/F4/Fz >O2/P8).  - Mild to moderate generalized slowing.    _____________________________________________________________  EEG IMPRESSION/CLINICAL CORRELATE    Abnormal EEG study.  1. Intermittent focal motor seizure of right frontal onset, characterized by subtle twitching of the left upper and lower extremities.  2. Mild to moderate nonspecific diffuse or multifocal cerebral dysfunction. [facility]                                                                      Francine Velasquez MD                                         Office: [address] [place] [address], [address]                                                 Phone: [phone]; Fax: [phone]                            ==============================================    EPILEPSY FOLLOW-UP NOTE      INTERVAL HISTORY:  Patient feels left EXT weakness much improved today. No more focal motor seizure. Patient said the numbness in BL toes has been there for years, then BL facial numbness within 1 year, and now BLE numbness and achy pain within last few months. Has been multiple different doctors. Spinal cord stimulator implanted without much improvement.       MEDICATIONS  (STANDING):  apixaban 5 milliGRAM(s) Oral two times a day  aspirin  chewable 81 milliGRAM(s) Oral daily  atorvastatin 10 milliGRAM(s) Oral at bedtime  escitalopram 10 milliGRAM(s) Oral daily  flecainide 50 milliGRAM(s) Oral two times a day  lacosamide 100 milliGRAM(s) Oral two times a day  levETIRAcetam 1000 milliGRAM(s) Oral two times a day  levothyroxine 112 MICROGram(s) Oral daily  lidocaine   Patch 1 Patch Transdermal every 24 hours  metoprolol succinate ER 25 milliGRAM(s) Oral daily  pregabalin 50 milliGRAM(s) Oral two times a day  tamsulosin 0.4 milliGRAM(s) Oral at bedtime    MEDICATIONS  (PRN):  LORazepam   Injectable 2 milliGRAM(s) IV Push every 5 minutes PRN Seizure      Vital Signs Last 24 Hrs  T(C): 36.9 (07 Jul 2020 22:17), Max: 36.9 (07 Jul 2020 22:17)  T(F): 98.4 (07 Jul 2020 22:17), Max: 98.4 (07 Jul 2020 22:17)  HR: 75 (08 Jul 2020 05:48) (65 - 82)  BP: 96/61 (08 Jul 2020 05:48) (96/61 - 118/64)  BP(mean): --  RR: 18 (08 Jul 2020 05:48) (18 - 18)  SpO2: 88% (08 Jul 2020 05:48) (88% - 97%)      GENERAL PHYSICAL EXAM:  GEN: no distress  HEENT:  NCAT, OP clear  EYES: sclera white, conjunctiva clear, no nystagmus  NECK: supple  CV: RRR, no murmur     		  PULM: CTAB, no wheezing  ABD: soft, +BS, NT  EXT: peripheral pulse intact, no cyanosis  MSK: muscle tone and strength normal  SKIN: warm, dry, no rash or lesion on exposed skin    NEUROLOGICAL EXAM:  Mental Status  Orientation: alert and oriented to person, place, time, and situation   Language: mild dysarthria, intact comprehension and repetition, intact naming and reading    Cranial Nerves  II: full visual fields intact   III, IV, VI: PERRL, EOMI  V, VII: facial sensation intact, BL masseter atrophy  VIII: hearing intact   IX, X: uvula midline, soft palate elevates normally   XI: BL shoulder shrug intact   XII: tongue midline, no fasciculation seen    Motor  5/5 R EXT  4+/5 L EXT       Tone and bulk are normal in upper and lower limbs  no fasciculation seen    Sensation  Intact to light touch and pinprick in all 4 EXTs    Reflex  2+ in BL biceps and R patella  0 in L patella                                   Plantar responses mute bilaterally    Coordination  Normal FTN bilaterally    Gait  Deferred      LABS:                          11.4   7.96  )-----------( 313      ( 08 Jul 2020 06:24 )             35.7     07-08    139  |  102  |  16.0  ----------------------------<  106<H>  4.0   |  26.0  |  0.57    Ca    8.8      08 Jul 2020 06:24  Phos  3.4     07-08  Mg     1.9     07-08 07-07-20 @ 07:01  -  07-08-20 @ 07:00  --------------------------------------------------------  IN: 0 mL / OUT: 875 mL / NET: -875 mL            OTHER IMAGING AND STUDIES:    < from: CT Head No Cont (07.04.20 @ 23:01) >  IMPRESSION:   The unenhanced brain CT appears nonacute. The current unenhanced brain CT remains nonacute and unchanged compared to the scan done earlier in the day.     < from: CT Head w/wo IV Cont (06.16.20 @ 14:19) >  IMPRESSION: No evidence of acute hemorrhage, mass, mass effect effect or abnormal areas of enhancement seen.        NEUROPHYSIOLOGY:    cEEG 7/7 - 7/8/20:   Abnormal EEG in the awake, drowsy and asleep states.  - Near continuous 1-1.5 Hz bilateral asymmetric lateralized periodic discharges with rhythmicity (LPD+R) broadly distributed over the right hemisphere, max in the frontal region (max Fp2/F4/Fz).  - Mild to moderate generalized slowing.    _____________________________________________________________  EEG IMPRESSION/CLINICAL CORRELATE    Abnormal EEG study.  1. Potential epileptogenic focus in the right frontal region.   2. Mild to moderate nonspecific diffuse or multifocal cerebral dysfunction.   3. No seizure seen.      cEEG 7/5 - 7/7/20:   EEG SUMMARY/CLASSIFICATION    Abnormal EEG in the awake, drowsy and asleep states.  - Intermittent twitching of the left upper and lower extremities noted at bedside, correlating to continuous right hemispheric LPD as described above, consistent with focal motor seizure of right frontal onset.  - Near continuous fluctuating 1-2 Hz bilateral asymmetric lateralized periodic discharges with rhythmicity (LPD+R) broadly distributed over the right hemisphere, max in the frontal region (max Fp2/F4/Fz >O2/P8).  - Mild to moderate generalized slowing.    _____________________________________________________________  EEG IMPRESSION/CLINICAL CORRELATE    Abnormal EEG study.  1. Intermittent focal motor seizure of right frontal onset, characterized by subtle twitching of the left upper and lower extremities.  2. Mild to moderate nonspecific diffuse or multifocal cerebral dysfunction.

## 2020-07-08 NOTE — EEG REPORT - NS EEG TEXT BOX
[facility]   COMPREHENSIVE EPILEPSY CENTER   REPORT OF LONG-TERM VIDEO EEG     [facility]: [address], [place] [address], Ph#: [phone]  [facility]: [address], [place] [address], Ph#: [phone]  [facility]: [address] [place] [address], Ph#: [phone]    Patient Name: SKYLER OSUNA  Age and : 70y (54)  MRN #: [id number]  Location: [address] [address]  Referring Physician: Raquel Schulz    Start Time/Date: 08:00 on 20  End Time/Date: 08:00 on 20  Duration: 24hr    _____________________________________________________________  STUDY INFORMATION    EEG Recording Technique:  The patient underwent continuous Video-EEG monitoring, using Telemetry System hardware on the [employer]LT[employer] Digital System. EEG and video data were stored on a computer hard drive with important events saved in digital archive files. The material was reviewed by a physician (electroencephalographer / epileptologist) on a daily basis. Andrew and seizure detection algorithms were utilized and reviewed. An EEG Technician attended to the patient, and was available throughout daytime work hours.  The epilepsy center neurologist was available in person or on call 24-hours per day.    EEG Placement and Labeling of Electrodes:  The EEG was performed utilizing 20 channel referential EEG connections (coronal over temporal over parasagittal montage) using all standard 10-20 electrode placements with EKG, with additional electrodes placed in the inferior temporal region using the modified 10-10 montage electrode placements for elective admissions, or if deemed necessary. Recording was at a sampling rate of 256 samples per second per channel. Time synchronized digital video recording was done simultaneously with EEG recording. A low light infrared camera was used for low light recording.     _____________________________________________________________  HISTORY    Patient is a 70y old  Female who presents with a chief complaint of Status epilepticus (2020 14:16)      PERTINENT MEDICATION:  lacosamide 100 milliGRAM(s) Oral two times a day  levETIRAcetam 1000 milliGRAM(s) Oral two times a day  pregabalin 50 milliGRAM(s) Oral two times a day    _____________________________________________________________  STUDY INTERPRETATION    Findings: The background was continuous, spontaneously variable and reactive. During wakefulness, poorly-modulated 7 Hz posterior dominant rhythm with amplitude to 30 uV, better seen over the left hemisphere.    Background Slowing:  Background predominantly consisted of theta, delta and faster activities.    Focal Slowing:   None were present.    Sleep Background:  Drowsiness was characterized by fragmentation, attenuation, and slowing of the background activity.    Sleep was characterized by the presence of asymmetric sleep spindles and K-complexes, better formed over the left hemisphere.    Other Non-Epileptiform Findings:  None were present.    Interictal Epileptiform Activity:   Near continuous 1-1.5 Hz bilateral asymmetric lateralized periodic discharges with rhythmicity (LPD+R) broadly distributed over the right hemisphere, max in the frontal region (max Fp2/F4/Fz).    Events:  Clinical events: None recorded.  Seizures: None recorded.     Activation Procedures:   Hyperventilation was not performed.    Photic stimulation was not performed.     Artifacts:  Intermittent myogenic and movement artifacts were noted.    _____________________________________________________________  EEG SUMMARY/CLASSIFICATION    Abnormal EEG in the awake, drowsy and asleep states.  - Near continuous 1-1.5 Hz bilateral asymmetric lateralized periodic discharges with rhythmicity (LPD+R) broadly distributed over the right hemisphere, max in the frontal region (max Fp2/F4/Fz).  - Mild to moderate generalized slowing.    _____________________________________________________________  EEG IMPRESSION/CLINICAL CORRELATE    Abnormal EEG study.  1. Potential epileptogenic focus in the right frontal region.   2. Mild to moderate nonspecific diffuse or multifocal cerebral dysfunction.   3. No seizure seen.    _____________________________________________________________    Francine Velasquez MD  Attending Physician, [facility]

## 2020-07-08 NOTE — PROGRESS NOTE ADULT - SUBJECTIVE AND OBJECTIVE BOX
Follow up for partial seizures, left  leg twitching and weakness   seen in am at the bedside , she is feeling better leg not numb, however c/o having body aches   EEG in progress       Vital Signs Last 24 Hrs  T(C): 36.8 (08 Jul 2020 09:47), Max: 36.9 (07 Jul 2020 22:17)  T(F): 98.3 (08 Jul 2020 09:47), Max: 98.4 (07 Jul 2020 22:17)  HR: 75 (08 Jul 2020 09:47) (65 - 82)  BP: 96/48 (08 Jul 2020 09:47) (96/48 - 118/64)  BP(mean): --  RR: 18 (08 Jul 2020 09:47) (18 - 18)  SpO2: 97% (08 Jul 2020 09:47) (88% - 97%)    GENERAL: NAD, lying in the bed   CHEST/LUNG: Clear to auscultation bilaterally  HEART: Regular rate and rhythm; s1/s2 ; No murmurs, rubs  ABDOMEN: Soft, Nontender, Nondistended; Bowel sounds present  EXTREMITIES:  No clubbing, cyanosis, or edema                                                 11.4   7.96  )-----------( 313      ( 08 Jul 2020 06:24 )             35.7   07-08    139  |  102  |  16.0  ----------------------------<  106<H>  4.0   |  26.0  |  0.57    Ca    8.8      08 Jul 2020 06:24  Phos  3.4     07-08  Mg     1.9     07-08

## 2020-07-08 NOTE — PROGRESS NOTE ADULT - ASSESSMENT
69 y/o female pmhx HTN, HLD, hypothyroid, Afib on Eliquis, AS, breast cancer( in remission), left lung nodule, spinal stimulator presented to ED   for LUE twitching/ tremor. Patient had RUE twitching/ Tremor subsequently developing tonic-clonic seizure  lasting about 1 minute.   Admitted to ICU for airway monitoring given post ictal state and encephelopathy. Son last night reported that she has had a 50lb unintentional weight loss over the past year and has experienced "chela and episodes of drooling". Started on iv keppra  , seen by neurologist , EEG ordered Pt is with intermittent left leg twitching  , video EEG ordered ,   Ct of head x2 unremarkable      1- seizure partial with abnormal  EEG   neurology follow up appreciated   cont current antiepileptic therapy   [employer]  contacted , pt is not candidate for MR due to multiple stimulator implanted in the past   cont EEG monitoring   CT of spine ordered to r/o spine pathology causing left leg numbness / weakness ?     2- H/o Atrial fib paroxysmal now in NSR   cont to monitor on vimpat   rate rythm is stable   cont anticoagulation and metoprolol   home meds resumed     3- h/o breast cancer / mastectomy in the past   reports having breast mammogram  last year had not seen her oncologist for many years   rec to follow with  oncology  after discharge Dr Grady ?     4- Hypothyroidsm -  on levothyroxine   stable     5- HTN -controlled   on metoprolol   low today . cont to monitor     discharge soon likely    PT ambulate

## 2020-07-08 NOTE — PROGRESS NOTE ADULT - ASSESSMENT
71yo LH female with a history of HTN, HLD, hypothyroid, afib (on apixaban), AS, left breast CA s/p mastectomy and chemo 2010, bilateral lung nodules, spinal stimulator for pain (ReferMe, unclear if MRI compatible) who presented with epilepsia partialis continua, without prior history of epilepsy.      Impression:  1. 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 Most concerning for brain mets given h/o breast CA and lung nodules, with recent 50lb wt loss. However, unable to obtain MRI due to spinal stimulator.     2. Left EXT weakness: Improving. Likely postictal paresis. CT T-L unremarkable. No UMN signs on exam.      Recommendation:  - transfer to EMU in 6T for cvEEG with continue monitoring   - convert levetiracetam 1000mg BID   - continue lacosamide 100mg BID   - pending CT w/ contrast A/C/P/H to r/o malignancy   - monitor left EXT weakness  - lorazepam 2mg IV prn convulsion (grand mal seizure)  - seizure precaution  - needs new referral to oncology; old oncologist moved?  - follow-up with established pulmonologist after discharge   - follow-up with me in clinic: [facility] [place] ([phone]), [address] [place] [address]       Will continue to follow with you.   ______________________  Francine Velasquez MD   [facility] Epilepsy  Cell: [phone]

## 2020-07-09 NOTE — PROGRESS NOTE ADULT - ASSESSMENT
71 y/o female pmhx HTN, HLD, hypothyroid, Afib on Eliquis, AS, breast cancer( in remission), left lung nodule, spinal stimulator presented to ED   for LUE twitching/ tremor. Patient had RUE twitching/ Tremor subsequently developing tonic-clonic seizure  lasting about 1 minute.   Admitted to ICU for airway monitoring given post ictal state and encephelopathy. Son last night reported that she has had a 50lb unintentional weight loss over the past year and has experienced "chela and episodes of drooling". Started on iv keppra  , seen by neurologist , EEG ordered Pt is with intermittent left leg twitching  , video EEG ordered ,   Ct of head x2 unremarkable  , CT of C /abd pelvis showed no mass , constipation     1- seizure disorder / partial seizure with abnormal  EEG   neurology follow up , EEG monitoring completed   cont current antiepileptic therapy   CT of brain with iv contrast no mass hemorrhage   she can not get MR due to spine stimulator     2- Neck pain likely musculoskeletal   add pain meds , motrin prn   increase frequency of lyrica tid     3- H/o Atrial fib,  paroxysmal   cont to monitor on vimpat   it is stable   cont anticoagulation and metoprolol   home meds resumed flecainide and metoprolol     4 h/o breast cancer / mastectomy in the past   reports having breast mammogram  last year had not seen her oncologist for many years   rec to follow with  oncology  after discharge Dr Grady     5 Hypothyroidsm -  on levothyroxine   stable TSH normal         discharge soon likely  to [facility]   PT ambulate

## 2020-07-09 NOTE — PROGRESS NOTE ADULT - ASSESSMENT
69yo LH female with a history of HTN, HLD, hypothyroid, afib (on apixaban), AS, left breast CA s/p mastectomy and chemo 2010, bilateral lung nodules, spinal stimulator for pain (import2, unclear if MRI compatible) who presented with epilepsia partialis continua, without prior history of epilepsy.      Impression:  1. 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 Most concerning for brain mets given h/o breast CA and lung nodules, with recent 50lb wt loss. However, unable to obtain MRI due to spinal stimulator. No evidence for malignancy per CT w/ contrast A/C/P/H.    2. Left EXT weakness: Resolved. Likely postictal paresis. CT T-L unremarkable. No UMN signs on exam.  3. Facial numbness, masseter atrophy, diffuse numbness/achy pain, cognitive involvement: possible motor neuron disease or demyelinating polyneuropathy?         Recommendation:  - continue levetiracetam 1000mg BID   - continue lacosamide 100mg BID   - lorazepam 2mg IV prn convulsion (grand mal seizure)  - seizure precaution  - outpt eval for possible motor neuron disease or demyelinating polyneuropathy, recommend [facility] neuromuscular clinic in [place] (Dr. Harrison Zavala)  - needs new referral to oncology; old oncologist moved?  - follow-up with established pulmonologist after discharge   - follow-up with me in clinic: [facility] [place] ([phone]), [address] [place] [address]   - dispo per primary team      Will continue to follow with you.   ______________________  Francine Velasquez MD   [facility] Epilepsy  Cell: [phone]

## 2020-07-09 NOTE — DIETITIAN INITIAL EVALUATION ADULT. - PERTINENT LABORATORY DATA
07-09 Na140 mmol/L Glu 90 mg/dL K+ 3.9 mmol/L Cr  0.51 mg/dL BUN 13.0 mg/dL Phos n/a   Alb 2.8 g/dL<L> PAB n/a

## 2020-07-09 NOTE — EEG REPORT - NS EEG TEXT BOX
[facility]   COMPREHENSIVE EPILEPSY CENTER   REPORT OF LONG-TERM VIDEO EEG     [facility]: [address], [place] [address], Ph#: [phone]  [facility]: [address], [place] [address], Ph#: [phone]  [facility]: [address] [place] [address], Ph#: [phone]    Patient Name: SKYLER OSUNA  Age and : 70y (54)  MRN #: [id number]  Location: [address]  Referring Physician: Raquel Schulz    Start Time/Date: 08:00 on 20  End Time/Date: 22:49 on 20  Duration: 13hr 12m    _____________________________________________________________  STUDY INFORMATION    EEG Recording Technique:  The patient underwent continuous Video-EEG monitoring, using Telemetry System hardware on the XLTek Digital System. EEG and video data were stored on a computer hard drive with important events saved in digital archive files. The material was reviewed by a physician (electroencephalographer / epileptologist) on a daily basis. Andrew and seizure detection algorithms were utilized and reviewed. An EEG Technician attended to the patient, and was available throughout daytime work hours.  The epilepsy center neurologist was available in person or on call 24-hours per day.    EEG Placement and Labeling of Electrodes:  The EEG was performed utilizing 20 channel referential EEG connections (coronal over temporal over parasagittal montage) using all standard 10-20 electrode placements with EKG, with additional electrodes placed in the inferior temporal region using the modified 10-10 montage electrode placements for elective admissions, or if deemed necessary. Recording was at a sampling rate of 256 samples per second per channel. Time synchronized digital video recording was done simultaneously with EEG recording. A low light infrared camera was used for low light recording.     _____________________________________________________________  HISTORY    Patient is a 70y old  Female who presents with a chief complaint of Status epilepticus (2020 14:16)      PERTINENT MEDICATION:  lacosamide 100 milliGRAM(s) Oral two times a day  levETIRAcetam 1000 milliGRAM(s) Oral two times a day  pregabalin 50 milliGRAM(s) Oral two times a day    _____________________________________________________________  STUDY INTERPRETATION    Findings: The background was continuous, spontaneously variable and reactive. During wakefulness, poorly-modulated 7 Hz posterior dominant rhythm with amplitude to 30 uV, better seen over the left hemisphere.    Background Slowing:  Background predominantly consisted of theta, delta and faster activities.    Focal Slowing:   None were present.    Sleep Background:  Drowsiness was characterized by fragmentation, attenuation, and slowing of the background activity.    Sleep was characterized by the presence of asymmetric sleep spindles and K-complexes, better formed over the left hemisphere.    Other Non-Epileptiform Findings:  None were present.    Interictal Epileptiform Activity:   Near continuous 1-1.5 Hz bilateral asymmetric lateralized periodic discharges with rhythmicity (LPD+R) broadly distributed over the right hemisphere, max in the frontocentral region (max Fz>F4).    Events:  Clinical events: None recorded.  Seizures: None recorded.     Activation Procedures:   Hyperventilation was not performed.    Photic stimulation was not performed.     Artifacts:  Intermittent myogenic and movement artifacts were noted.    _____________________________________________________________  EEG SUMMARY/CLASSIFICATION    Abnormal EEG in the awake, drowsy and asleep states.  - Near continuous 1-1.5 Hz bilateral asymmetric lateralized periodic discharges with rhythmicity (LPD+R) broadly distributed over the right hemisphere, max in the frontocentral region (max Fz>F4).  - Mild to moderate generalized slowing.    _____________________________________________________________  EEG IMPRESSION/CLINICAL CORRELATE    Abnormal EEG study.  1. Potential epileptogenic focus in the right frontocentral region.   2. Mild to moderate nonspecific diffuse or multifocal cerebral dysfunction.   3. No seizure seen.    _____________________________________________________________    Francine Velasquez MD  Attending Physician, [facility]

## 2020-07-09 NOTE — DIETITIAN INITIAL EVALUATION ADULT. - OTHER INFO
69 y/o female pmhx HTN, HLD, hypothyroid, Afib on Eliquis, AS, breast cancer( in remission), left lung nodule, spinal stimulator presented to ED   for LUE twitching/ tremor. Patient had RUE twitching/ Tremor subsequently developing tonic-clonic seizure  lasting about 1 minute.   Admitted to ICU for airway monitoring given post ictal state and encephelopathy. Son last night reported that she has had a 50lb unintentional weight loss over the past year and has experienced "chela and episodes of drooling". Started on iv keppra  , seen by neurologist , EEG ordered Pt is with intermittent left leg twitching  , video EEG ordered ,   Ct of head x2 unremarkable  , CT of C /abd pelvis showed no mass , constipation.    Pt reporting loss of sensation in her face and neck impairing her ability to consume sufficient nutrients. Pt endorses a 70 lb weight loss in the last year- reporting her usual weight to be 185 lbs.   Pt reports that she can only swallow soft foods and observed only the soft items consumed on her tray.   physical signs of malnutrition [ ]absent [x ]present. [ ]Mild [x ]Moderate [ ]Severe Muscle wasting present at [x ]clavicle [ ]interosseos [ x]calf. [ ]Mild [x ]Moderate [ ]Severe subcutaneous fat loss presents at [ ]ribs [x]orbital region [ ]triceps [x ]buccal area.      1- seizure disorder / partial seizure with abnormal  EEG

## 2020-07-09 NOTE — DIETITIAN INITIAL EVALUATION ADULT. - PROBLEM SELECTOR PLAN 1
Etiology not entirely clear at this time. Benzo withdrawal? Spinal stimulator malfunction? No acute infarct, hemorrhage, or mass seen on CT head. Cannot exclude relation to meningitis given elevated WBC with left shift and borderline fever (99.7'F), although no evidence of meningismus.   Loaded with 2g Keppra, will start maintenance dosing. Will use IV ativan to break further seizure activity. Start EEG monitoring to r/o subclinical seizures. Will cover empirically for meningitis (ceftriaxone/vancomycin/ampicillin/acyclovir). Defer LP for now as she is on apixaban, will hold AM dose so procedure can be performed. May require MRI to r/o brain mass in the setting of recently diagnosed lung nodule. Neurology service is already on board.

## 2020-07-09 NOTE — GOALS OF CARE CONVERSATION - ADVANCED CARE PLANNING - CONVERSATION DETAILS
had long discussion with the son and patient at the bedside about all testing result , findings and current treatment plan and discharge planning   , pt is [facility] to [facility] to [facility] , she lives alone   Asked the patient about wishes and goals , advance directives   she states that she wants to kept alive and resuscitate  at present if cause is reversible and treatable   unless she is very sick terminal and has poor quality of live than may not want to be kept alive on ventilator

## 2020-07-09 NOTE — PROGRESS NOTE ADULT - SUBJECTIVE AND OBJECTIVE BOX
Follow up for partial seizures, left  leg twitching and weakness   seen earlier today , c/o neck pain today , she states that her left leg pain numbness much better   no overnight events reported, d/w Dr PAGAN   video EEG is completed     Vital Signs Last 24 Hrs  T(C): 36.8 (09 Jul 2020 10:00), Max: 37.3 (09 Jul 2020 05:09)  T(F): 98.3 (09 Jul 2020 10:00), Max: 99.2 (09 Jul 2020 05:09)  HR: 83 (09 Jul 2020 10:39) (71 - 84)  BP: 117/71 (09 Jul 2020 10:00) (95/49 - 117/71)  BP(mean): --  RR: 16 (09 Jul 2020 10:39) (12 - 20)  SpO2: 100% (09 Jul 2020 10:39) (96% - 100%)    GENERAL: NAD, lying in the bed   Neck : limited ROM , tenderness   CHEST/LUNG: Clear to auscultation bilaterally  HEART: Regular rate and rhythm; s1/s2 ; No murmurs, rubs  ABDOMEN: Soft, RUQ tenderness on palpation , Nondistended; Bowel sounds present  EXTREMITIES:  No clubbing, cyanosis, or edema                                              11.4   7.96  )-----------( 313      ( 08 Jul 2020 06:24 )             35.7   07-09    140  |  105  |  13.0  ----------------------------<  90  3.9   |  23.0  |  0.51    Ca    8.5<L>      09 Jul 2020 10:00  Phos  3.4     07-08  Mg     1.7     07-09    TPro  5.7<L>  /  Alb  2.8<L>  /  TBili  <0.2<L>  /  DBili  <0.1  /  AST  23  /  ALT  17  /  AlkPhos  53  07-09          < from: CT Abdomen and Pelvis w/ IV Cont (07.08.20 @ 18:50) >  MPRESSION:    Mildly enlarged subcarinal lymph node, not significantly changed in size since 4/11/2018, otherwise no enlarged lymph nodes within the chest, abdomen or pelvis.    Tree-in-bud opacities in the right lower and upper lobes, slightly improved since 6/16/2020, likely infectious/inflammatory in etiology.    Air in urinary bladder which can be secondary to recent instrumentation/Ford catheter, otherwise correlation is recommended for underlying infection.                LIDIA COBB M.D., ATTENDING RADIOLOGIST  This document has been electronically signed. Jul 9 2020 10:13AM                  < end of copied text >

## 2020-07-09 NOTE — DIETITIAN INITIAL EVALUATION ADULT. - PERTINENT MEDS FT
MEDICATIONS  (STANDING):  acetaminophen   Tablet .. 975 milliGRAM(s) Oral every 8 hours  apixaban 5 milliGRAM(s) Oral two times a day  aspirin  chewable 81 milliGRAM(s) Oral daily  atorvastatin 10 milliGRAM(s) Oral at bedtime  escitalopram 10 milliGRAM(s) Oral daily  flecainide 50 milliGRAM(s) Oral two times a day  guaiFENesin  milliGRAM(s) Oral every 12 hours  lacosamide 100 milliGRAM(s) Oral two times a day  levETIRAcetam 1000 milliGRAM(s) Oral two times a day  levothyroxine 112 MICROGram(s) Oral daily  lidocaine   Patch 1 Patch Transdermal every 24 hours  melatonin 3 milliGRAM(s) Oral at bedtime  metoprolol succinate ER 25 milliGRAM(s) Oral daily  polyethylene glycol 3350 17 Gram(s) Oral two times a day  pregabalin 50 milliGRAM(s) Oral three times a day  tamsulosin 0.4 milliGRAM(s) Oral at bedtime    MEDICATIONS  (PRN):  ibuprofen  Tablet. 400 milliGRAM(s) Oral every 8 hours PRN Moderate Pain (4 - 6)  LORazepam   Injectable 2 milliGRAM(s) IV Push every 5 minutes PRN Seizure  oxyCODONE    IR 5 milliGRAM(s) Oral every 6 hours PRN Severe Pain (7 - 10)

## 2020-07-09 NOTE — CHART NOTE - NSCHARTNOTEFT_GEN_A_CORE
Upon Nutritional Assessment by the Registered Dietitian your patient was determined to meet criteria / has evidence of the following diagnosis/diagnoses:               [x ] Severe Protein Calorie Malnutrition         Findings as based on:  •  Comprehensive nutrition assessment and consultation  •  Calorie counts (nutrient intake analysis)  •  Food acceptance and intake status from observations by staff  •  Follow up  •  Patient education  •  Intervention secondary to interdisciplinary rounds  •   concerns      Treatment:    The following diet has been recommended:    1) ensure TID  2) mechanical soft diet   3) swallow eval by SLP?  4) daily weights  5) encourage PO intake  6) MVI with minerals  PROVIDER Section:     By signing this assessment you are acknowledging and agree with the diagnosis/diagnoses assigned by the Registered Dietitian    Comments:

## 2020-07-09 NOTE — PROGRESS NOTE ADULT - SUBJECTIVE AND OBJECTIVE BOX
[facility]                                                                      Francine Velasquez MD                                         Office: [facility] [place], [place], [address]                                                 Phone: [phone]; Fax: [phone]                            ==============================================    EPILEPSY FOLLOW-UP NOTE      INTERVAL HISTORY:  Remains seizure free. Left hemiparesis resolved. Discussed in length yesterday with pt and her son Jay about symptoms of numbness/tingling, pain and bulbar weakness. Summarized as below.    - Bulbar (most prominent): significant atrophy in bilateral masseter, weakness in lower face resulting in poor lip seal and sialorrhea, numbness in bilateral face, slurred speech   - Extremities: diffuse numbness, achy pain, spasm/cramp   - Cognitive: language fluency, strange behavior like calling son at 5am to have casual conversation, texting son with "911" for trivial matters       MEDICATIONS  (STANDING):  acetaminophen   Tablet .. 975 milliGRAM(s) Oral every 8 hours  apixaban 5 milliGRAM(s) Oral two times a day  aspirin  chewable 81 milliGRAM(s) Oral daily  atorvastatin 10 milliGRAM(s) Oral at bedtime  escitalopram 10 milliGRAM(s) Oral daily  flecainide 50 milliGRAM(s) Oral two times a day  guaiFENesin  milliGRAM(s) Oral every 12 hours  lacosamide 100 milliGRAM(s) Oral two times a day  levETIRAcetam 1000 milliGRAM(s) Oral two times a day  levothyroxine 112 MICROGram(s) Oral daily  lidocaine   Patch 1 Patch Transdermal every 24 hours  melatonin 3 milliGRAM(s) Oral at bedtime  metoprolol succinate ER 25 milliGRAM(s) Oral daily  polyethylene glycol 3350 17 Gram(s) Oral two times a day  pregabalin 50 milliGRAM(s) Oral three times a day  tamsulosin 0.4 milliGRAM(s) Oral at bedtime    MEDICATIONS  (PRN):  ibuprofen  Tablet. 400 milliGRAM(s) Oral every 8 hours PRN Moderate Pain (4 - 6)  LORazepam   Injectable 2 milliGRAM(s) IV Push every 5 minutes PRN Seizure  oxyCODONE    IR 5 milliGRAM(s) Oral every 6 hours PRN Severe Pain (7 - 10)      Vital Signs Last 24 Hrs  T(C): 36.8 (09 Jul 2020 10:00), Max: 37.3 (09 Jul 2020 05:09)  T(F): 98.3 (09 Jul 2020 10:00), Max: 99.2 (09 Jul 2020 05:09)  HR: 83 (09 Jul 2020 10:39) (71 - 84)  BP: 117/71 (09 Jul 2020 10:00) (95/49 - 117/71)  BP(mean): --  RR: 16 (09 Jul 2020 10:39) (12 - 20)  SpO2: 100% (09 Jul 2020 10:39) (96% - 100%)      GENERAL PHYSICAL EXAM:  GEN: no distress  HEENT:  NCAT, OP clear  EYES: sclera white, conjunctiva clear, no nystagmus  NECK: supple  CV: RRR, no murmur     		  PULM: CTAB, no wheezing  ABD: soft, +BS, NT  EXT: peripheral pulse intact, no cyanosis  MSK: muscle tone and strength normal  SKIN: warm, dry, no rash or lesion on exposed skin    NEUROLOGICAL EXAM:  Mental Status  Orientation: alert and oriented to person, place, time, and situation   Language: mild dysarthria, intact comprehension and repetition, intact naming and reading    Cranial Nerves  II: full visual fields intact   III, IV, VI: PERRL, EOMI  V, VII: facial sensation intact, BL masseter atrophy  VIII: hearing intact   IX, X: uvula midline, soft palate elevates normally   XI: BL shoulder shrug intact   XII: tongue midline, no fasciculation seen    Motor  5/5 all 4 EXTs         Tone and bulk are normal in upper and lower limbs  no fasciculation seen    Sensation  Intact to light touch and pinprick in all 4 EXTs    Reflex  2+ in BL biceps and R patella  0 in L patella                                   Plantar responses mute bilaterally    Coordination  Normal FTN bilaterally    Gait  Deferred      LABS:                          11.4   7.96  )-----------( 313      ( 08 Jul 2020 06:24 )             35.7     07-08    139  |  102  |  16.0  ----------------------------<  106<H>  4.0   |  26.0  |  0.57    Ca    8.8      08 Jul 2020 06:24  Phos  3.4     07-08  Mg     1.9     07-08 07-07-20 @ 07:01  -  07-08-20 @ 07:00  --------------------------------------------------------  IN: 0 mL / OUT: 875 mL / NET: -875 mL            OTHER IMAGING AND STUDIES:    < from: CT Head No Cont (07.04.20 @ 23:01) >  IMPRESSION:   The unenhanced brain CT appears nonacute. The current unenhanced brain CT remains nonacute and unchanged compared to the scan done earlier in the day.     < from: CT Head w/wo IV Cont (06.16.20 @ 14:19) >  IMPRESSION: No evidence of acute hemorrhage, mass, mass effect effect or abnormal areas of enhancement seen.        NEUROPHYSIOLOGY:    cEEG 7/8 - 7/9/20:   EEG SUMMARY/CLASSIFICATION    Abnormal EEG in the awake, drowsy and asleep states.  - Near continuous 1-1.5 Hz bilateral asymmetric lateralized periodic discharges with rhythmicity (LPD+R) broadly distributed over the right hemisphere, max in the frontocentral region (max Fz>F4).  - Mild to moderate generalized slowing.    _____________________________________________________________  EEG IMPRESSION/CLINICAL CORRELATE    Abnormal EEG study.  1. Potential epileptogenic focus in the right frontocentral region.   2. Mild to moderate nonspecific diffuse or multifocal cerebral dysfunction.   3. No seizure seen.      cEEG 7/7 - 7/8/20:   Abnormal EEG in the awake, drowsy and asleep states.  - Near continuous 1-1.5 Hz bilateral asymmetric lateralized periodic discharges with rhythmicity (LPD+R) broadly distributed over the right hemisphere, max in the frontal region (max Fp2/F4/Fz).  - Mild to moderate generalized slowing.    _____________________________________________________________  EEG IMPRESSION/CLINICAL CORRELATE    Abnormal EEG study.  1. Potential epileptogenic focus in the right frontal region.   2. Mild to moderate nonspecific diffuse or multifocal cerebral dysfunction.   3. No seizure seen.      cEEG 7/5 - 7/7/20:   EEG SUMMARY/CLASSIFICATION    Abnormal EEG in the awake, drowsy and asleep states.  - Intermittent twitching of the left upper and lower extremities noted at bedside, correlating to continuous right hemispheric LPD as described above, consistent with focal motor seizure of right frontal onset.  - Near continuous fluctuating 1-2 Hz bilateral asymmetric lateralized periodic discharges with rhythmicity (LPD+R) broadly distributed over the right hemisphere, max in the frontal region (max Fp2/F4/Fz >O2/P8).  - Mild to moderate generalized slowing.    _____________________________________________________________  EEG IMPRESSION/CLINICAL CORRELATE    Abnormal EEG study.  1. Intermittent focal motor seizure of right frontal onset, characterized by subtle twitching of the left upper and lower extremities.  2. Mild to moderate nonspecific diffuse or multifocal cerebral dysfunction.

## 2020-07-10 NOTE — SWALLOW BEDSIDE ASSESSMENT ADULT - ASR SWALLOW ASPIRATION MONITOR
upper respiratory infection/position upright (90Y)/cough/fever/gurgly voice/throat clearing/change of breathing pattern/pneumonia/oral hygiene

## 2020-07-10 NOTE — SWALLOW BEDSIDE ASSESSMENT ADULT - DIET PRIOR TO ADMI
Pt reports reduced PO intake, liquids, smoothies, puree-type foods 2* facial numbness and overall reduced appetite

## 2020-07-10 NOTE — SWALLOW BEDSIDE ASSESSMENT ADULT - ADDITIONAL RECOMMENDATIONS
Ongoing evaluation to determine etiology of facial numbness and masseter atrophy (work up as outpatient upon d/c pending). Pt educated re: rx for diet modification to puree and in agreement with puree diet at this time. Aspiration precautions and s/s aspiration reviewed.  Pt educated to monitor swallow function and seek reevaluation if further decline in function, or as rx by MD following ongoing assessment to determine etiology. Pt verbalized understanding and agreement    Consider speech/language and/or cognitive-linguistic evaluation (can be completed on an outpatient basis)

## 2020-07-10 NOTE — SWALLOW BEDSIDE ASSESSMENT ADULT - SWALLOW EVAL: RECOMMENDED FEEDING/EATING TECHNIQUES
maintain upright posture during/after eating for 30 mins/oral hygiene/position upright (90 degrees)/crush medication (when feasible)/small sips/bites

## 2020-07-10 NOTE — SWALLOW BEDSIDE ASSESSMENT ADULT - SLP GENERAL OBSERVATIONS
Pt received A&A in bed, 0x3, good historian, reporting facial numbness and weakness, beginning unilaterally ~1 year ago, however now bilaterally with "pins and needles" sensation, mildly dysarthric (overall intelligibility is good), reporting deficits in recall and overall cognitive function. Pt reports having seen numerous specialists during the past year, however no etiology for symptoms has yet been determined.  Pain reported as 0/10 pre/post evaluation

## 2020-07-10 NOTE — SWALLOW BEDSIDE ASSESSMENT ADULT - ORAL PHASE
Within functional limits Pt noted with difficulty with mastication, reporting unable to feel solid for mastication, +lingual stasis post swallow/Delayed oral transit time

## 2020-07-10 NOTE — DISCHARGE NOTE PROVIDER - HOSPITAL COURSE
71 y/o female pmhx HTN, HLD, hypothyroid, Afib on Eliquis, AS, breast cancer( in remission), left lung nodule, spinal stimulator presented to ED   for LUE twitching/ tremor. Patient had RUE twitching/ Tremor subsequently developing tonic-clonic seizure  lasting about 1 minute.     Admitted to ICU for airway monitoring given post ictal state and encephelopathy. Son last night reported that she has had a 50lb unintentional weight loss over the past year and has experienced "chela and episodes of drooling". Started on iv keppra  , seen by neurologist ,Pt is with intermittent left leg twitching  , video EEG ordered , leg numbness twitching much improved . Pt can not have MR due to stimulator     Ct of head x2 unremarkable  , CT of C /abd pelvis showed no mass , constipation stool softener laxative given     video EEG completed + seizure activities seen , leg twitching under control , stable         she is discharge to [facility] 71 y/o female pmhx HTN, HLD, hypothyroid, Afib on Eliquis, AS, breast cancer( in remission), left lung nodule, spinal stimulator presented to ED for LUE twitching/ tremor. Patient had RUE twitching/ Tremor subsequently developing tonic-clonic seizure  lasting about 1 minute. Admitted to ICU for airway monitoring given post ictal state and encephelopathy. Started on IV keppra, neurology consulted. Pt is with intermittent left leg twitching, video EEG completed and seizure activity was noted. Pt can not have MRI due to spinal stimulator. Ct of head x2 unremarkable, CT of C /abd pelvis showed no mass, constipation stool softener laxative given. Patient's Afib controlled on metoprolol po qd, flecainide po bid and eliquis po bid. Hypothyroidism was stable, continued on levothyroxine po qd. Patient put on Vimpat po bid and Keppra po bid. No seizures since admission. Leg numbness/twitching much improved. Outpt eval for possible motor neuron disease or demyelinating polyneuropathy; appt with Dr. Harrison Zavala. Recommended to the patient to follow up with Dr. Degroot due to history of breast cancer.  Patient was cleared by Neuro, Patient is stable for discharge to [facility].                 >40 mins spend on discharge 71 y/o female pmhx HTN, HLD, hypothyroid, Afib on Eliquis, AS, breast cancer( in remission), left lung nodule, spinal stimulator presented to ED for LUE twitching/ tremor. Patient had RUE twitching/ Tremor subsequently developing tonic-clonic seizure  lasting about 1 minute. Admitted to ICU for airway monitoring given post ictal state and encephelopathy. Started on IV keppra, neurology consulted. Pt is with intermittent left leg twitching, video EEG completed and seizure activity was noted. Pt can not have MRI due to spinal stimulator. Ct of head x2 unremarkable, CT of C /abd pelvis showed no mass, constipation stool softener laxative given. Patient's Afib controlled on metoprolol po qd, flecainide po bid and eliquis po bid. Hypothyroidism was stable, continued on levothyroxine po qd. Patient put on Vimpat po bid and Keppra po bid. No seizures since admission. Leg numbness/twitching much improved. Outpt eval for possible motor neuron disease or demyelinating polyneuropathy; appt with Dr. Harrison Zavala. Recommended to the patient to follow up with Dr. Degroot due to history of breast cancer.  Patient was cleared by Neuro, Patient is stable for discharge to [facility].                 >40 mins spend on discharge             Vital Signs Last 24 Hrs    T(C): 36.8 (13 Jul 2020 07:45), Max: 36.8 (12 Jul 2020 19:44)    T(F): 98.3 (13 Jul 2020 07:45), Max: 98.3 (13 Jul 2020 07:45)    HR: 76 (13 Jul 2020 07:45) (65 - 85)    BP: 94/68 (13 Jul 2020 07:45) (81/53 - 102/63)    BP(mean): --    RR: 18 (13 Jul 2020 07:45) (18 - 18)    SpO2: 97% (13 Jul 2020 07:45) (95% - 98%)                    CONSTITUTIONAL: Well appearing, well nourished, awake, alert and in no apparent distress    CARDIAC: irregular irregular  Heart sounds S1, S2.  No murmurs, rubs or gallops     RESPIRATORY: Breath sounds clear and equal bilaterally. No wheezes, rhales or rhonchi    GASTROENTEROLOGY: soft nt nd bs+ normoactive     EXTREMITIES: No edema, cyanosis or deformity     NEUROLOGICAL: Alert and oriented, no focal deficits, no motor or sensory deficits.    SKIN: No rash, skin turgor wnl

## 2020-07-10 NOTE — SWALLOW BEDSIDE ASSESSMENT ADULT - PHARYNGEAL PHASE
Here for Gardasil # 2 injection, without complaint at this time. Reports no pain before or after injection. Advised to wait in lobby 15 minutes after injection and report any adverse reactions. Return to clinic in 4 MONTHS for next injection.     Site: IZZY  
Within functional limits

## 2020-07-10 NOTE — DISCHARGE NOTE PROVIDER - PROVIDER TOKENS
PROVIDER:[TOKEN:[[id number]],FOLLOWUP:[2 weeks]] PROVIDER:[TOKEN:[[id number]],FOLLOWUP:[2 weeks]],PROVIDER:[TOKEN:[[id number]],FOLLOWUP:[2 weeks]],FREE:[LAST:[Primary Care doctor],PHONE:[(   )    -],FAX:[(   )    -],FOLLOWUP:[2 weeks]]

## 2020-07-10 NOTE — DISCHARGE NOTE PROVIDER - NSDCMRMEDTOKEN_GEN_ALL_CORE_FT
apixaban 5 mg oral tablet: 1 tab(s) orally 2 times a day  aspirin 81 mg oral tablet: 1 tab(s) orally once a day  atorvastatin 10 mg oral tablet: 1 tab(s) orally once a day (at bedtime)  flecainide 50 mg oral tablet: 1 tab(s) orally every 12 hours   gabapentin 300 mg oral capsule: 1 cap(s) orally 3 times a day  lacosamide 100 mg oral tablet: 1 tab(s) orally 2 times a day  levETIRAcetam 1000 mg oral tablet: 1 tab(s) orally 2 times a day  Lexapro 10 mg oral tablet: 1 tab(s) orally once a day  lidocaine 5% topical film: Apply topically to affected area once a day  Lyrica 50 mg oral capsule: 1 cap(s) orally 2 times a day  melatonin 3 mg oral tablet: 1 tab(s) orally once a day (at bedtime)  Percocet 5/325 oral tablet: 1 tab(s) orally 2 times a day, As Needed  polyethylene glycol 3350 oral powder for reconstitution: 17 gram(s) orally 2 times a day  sucralfate 1 g oral tablet: 1 tab(s) orally every 12 hours  Synthroid 112 mcg (0.112 mg) oral tablet: 1 tab(s) orally once a day  tamsulosin 0.4 mg oral capsule: 1 cap(s) orally once a day (at bedtime)  Toprol-XL 25 mg oral tablet, extended release: 1 tab(s) orally once a day apixaban 5 mg oral tablet: 1 tab(s) orally 2 times a day  aspirin 81 mg oral tablet: 1 tab(s) orally once a day  atorvastatin 10 mg oral tablet: 1 tab(s) orally once a day (at bedtime)  flecainide 50 mg oral tablet: 1 tab(s) orally every 12 hours   guaiFENesin 600 mg oral tablet, extended release: 1 tab(s) orally every 12 hours  ibuprofen 400 mg oral tablet: 1 tab(s) orally every 8 hours, As needed, Moderate Pain (4 - 6)  lacosamide 100 mg oral tablet: 1 tab(s) orally 2 times a day  levETIRAcetam 1000 mg oral tablet: 1 tab(s) orally 2 times a day  Lexapro 10 mg oral tablet: 1 tab(s) orally once a day  lidocaine 5% topical film: Apply topically to affected area once a day  Lyrica 50 mg oral capsule: 1 cap(s) orally 2 times a day  melatonin 3 mg oral tablet: 1 tab(s) orally once a day (at bedtime)  oxyCODONE 5 mg oral tablet: 1 tab(s) orally every 6 hours, As needed, Severe Pain (7 - 10)  polyethylene glycol 3350 oral powder for reconstitution: 17 gram(s) orally 2 times a day  sucralfate 1 g oral tablet: 1 tab(s) orally every 12 hours  Synthroid 112 mcg (0.112 mg) oral tablet: 1 tab(s) orally once a day  tamsulosin 0.4 mg oral capsule: 1 cap(s) orally once a day (at bedtime)  Toprol-XL 25 mg oral tablet, extended release: 1 tab(s) orally once a day

## 2020-07-10 NOTE — PROGRESS NOTE ADULT - SUBJECTIVE AND OBJECTIVE BOX
[facility]                                                                      Francine Velasquez MD                                         Office: [facility] [place], [address]                                                 Phone: [phone]; Fax: [phone]                            ==============================================    EPILEPSY FOLLOW-UP NOTE      INTERVAL HISTORY:  Pending discharge to rehab. Doing well.      MEDICATIONS  (STANDING):  acetaminophen   Tablet .. 975 milliGRAM(s) Oral every 8 hours  apixaban 5 milliGRAM(s) Oral two times a day  aspirin  chewable 81 milliGRAM(s) Oral daily  atorvastatin 10 milliGRAM(s) Oral at bedtime  escitalopram 10 milliGRAM(s) Oral daily  flecainide 50 milliGRAM(s) Oral two times a day  guaiFENesin  milliGRAM(s) Oral every 12 hours  lacosamide 100 milliGRAM(s) Oral two times a day  levETIRAcetam 1000 milliGRAM(s) Oral two times a day  levothyroxine 112 MICROGram(s) Oral daily  lidocaine   Patch 1 Patch Transdermal every 24 hours  melatonin 3 milliGRAM(s) Oral at bedtime  metoprolol succinate ER 25 milliGRAM(s) Oral daily  polyethylene glycol 3350 17 Gram(s) Oral two times a day  pregabalin 50 milliGRAM(s) Oral three times a day  sucralfate 1 Gram(s) Oral every 12 hours  tamsulosin 0.4 milliGRAM(s) Oral at bedtime    MEDICATIONS  (PRN):  ibuprofen  Tablet. 400 milliGRAM(s) Oral every 8 hours PRN Moderate Pain (4 - 6)  LORazepam   Injectable 2 milliGRAM(s) IV Push every 5 minutes PRN Seizure  oxyCODONE    IR 5 milliGRAM(s) Oral every 6 hours PRN Severe Pain (7 - 10)        Vital Signs Last 24 Hrs  T(C): 36.6 (10 Jul 2020 05:32), Max: 36.8 (09 Jul 2020 10:00)  T(F): 97.8 (10 Jul 2020 05:32), Max: 98.3 (09 Jul 2020 10:00)  HR: 80 (10 Jul 2020 08:05) (77 - 87)  BP: 130/78 (10 Jul 2020 05:32) (102/63 - 130/78)  BP(mean): --  RR: 16 (10 Jul 2020 08:05) (12 - 16)  SpO2: 98% (10 Jul 2020 08:05) (97% - 100%)      GENERAL PHYSICAL EXAM:  GEN: no distress  HEENT:  NCAT, OP clear  EYES: sclera white, conjunctiva clear, no nystagmus  NECK: supple  CV: RRR, no murmur     		  PULM: CTAB, no wheezing  ABD: soft, +BS, NT  EXT: peripheral pulse intact, no cyanosis  MSK: muscle tone and strength normal  SKIN: warm, dry, no rash or lesion on exposed skin    NEUROLOGICAL EXAM:  Mental Status  Orientation: alert and oriented to person, place, time, and situation   Language: mild dysarthria, intact comprehension and repetition, intact naming and reading    Cranial Nerves  II: full visual fields intact   III, IV, VI: PERRL, EOMI  V, VII: facial sensation intact, BL masseter atrophy  VIII: hearing intact   IX, X: uvula midline, soft palate elevates normally   XI: BL shoulder shrug intact   XII: tongue midline, no fasciculation seen    Motor  5/5 all 4 EXTs         Tone and bulk are normal in upper and lower limbs  no fasciculation seen    Sensation  Intact to light touch and pinprick in all 4 EXTs    Reflex  2+ in BL biceps and R patella  0 in L patella                                   Plantar responses mute bilaterally    Coordination  Normal FTN bilaterally    Gait  Deferred        LABS:       07-09    140  |  105  |  13.0  ----------------------------<  90  3.9   |  23.0  |  0.51    Ca    8.5<L>      09 Jul 2020 10:00  Mg     1.7     07-09    TPro  5.7<L>  /  Alb  2.8<L>  /  TBili  <0.2<L>  /  DBili  <0.1  /  AST  23  /  ALT  17  /  AlkPhos  53  07-09    CAPILLARY BLOOD GLUCOSE        LIVER FUNCTIONS - ( 09 Jul 2020 10:00 )  Alb: 2.8 g/dL / Pro: 5.7 g/dL / ALK PHOS: 53 U/L / ALT: 17 U/L / AST: 23 U/L / GGT: x                 07-09-20 @ 07:01  -  07-10-20 @ 07:00  --------------------------------------------------------  IN: 650 mL / OUT: 300 mL / NET: 350 mL                OTHER IMAGING AND STUDIES:    < from: CT Head No Cont (07.04.20 @ 23:01) >  IMPRESSION:   The unenhanced brain CT appears nonacute. The current unenhanced brain CT remains nonacute and unchanged compared to the scan done earlier in the day.     < from: CT Head w/wo IV Cont (06.16.20 @ 14:19) >  IMPRESSION: No evidence of acute hemorrhage, mass, mass effect effect or abnormal areas of enhancement seen.        NEUROPHYSIOLOGY:    cEEG 7/8 - 7/9/20:   EEG SUMMARY/CLASSIFICATION    Abnormal EEG in the awake, drowsy and asleep states.  - Near continuous 1-1.5 Hz bilateral asymmetric lateralized periodic discharges with rhythmicity (LPD+R) broadly distributed over the right hemisphere, max in the frontocentral region (max Fz>F4).  - Mild to moderate generalized slowing.    _____________________________________________________________  EEG IMPRESSION/CLINICAL CORRELATE    Abnormal EEG study.  1. Potential epileptogenic focus in the right frontocentral region.   2. Mild to moderate nonspecific diffuse or multifocal cerebral dysfunction.   3. No seizure seen.      cEEG 7/7 - 7/8/20:   Abnormal EEG in the awake, drowsy and asleep states.  - Near continuous 1-1.5 Hz bilateral asymmetric lateralized periodic discharges with rhythmicity (LPD+R) broadly distributed over the right hemisphere, max in the frontal region (max Fp2/F4/Fz).  - Mild to moderate generalized slowing.    _____________________________________________________________  EEG IMPRESSION/CLINICAL CORRELATE    Abnormal EEG study.  1. Potential epileptogenic focus in the right frontal region.   2. Mild to moderate nonspecific diffuse or multifocal cerebral dysfunction.   3. No seizure seen.      cEEG 7/5 - 7/7/20:   EEG SUMMARY/CLASSIFICATION    Abnormal EEG in the awake, drowsy and asleep states.  - Intermittent twitching of the left upper and lower extremities noted at bedside, correlating to continuous right hemispheric LPD as described above, consistent with focal motor seizure of right frontal onset.  - Near continuous fluctuating 1-2 Hz bilateral asymmetric lateralized periodic discharges with rhythmicity (LPD+R) broadly distributed over the right hemisphere, max in the frontal region (max Fp2/F4/Fz >O2/P8).  - Mild to moderate generalized slowing.    _____________________________________________________________  EEG IMPRESSION/CLINICAL CORRELATE    Abnormal EEG study.  1. Intermittent focal motor seizure of right frontal onset, characterized by subtle twitching of the left upper and lower extremities.  2. Mild to moderate nonspecific diffuse or multifocal cerebral dysfunction.

## 2020-07-10 NOTE — SWALLOW BEDSIDE ASSESSMENT ADULT - ORAL PREPARATORY PHASE
Pt reports reduced buccal and labial sensation, however able to seal lips of spoon to strip bolus/Within functional limits Pt reports reduced buccal and labial sensation, however able to seal lips of spoon to strip bolus Pt's report of reduced sensation - impacting lip seal on cup rim, reduced sensation to draw liquid from cup, however improved via straw administration. Pt reports reduced sensation with straw, however notes it's "easier" to swallow via straw administration

## 2020-07-10 NOTE — SWALLOW BEDSIDE ASSESSMENT ADULT - SWALLOW EVAL: DIAGNOSIS
Moderate oral dysphagia with [facility] soft solids, functional for puree, thin liquids via straw administration. Pt with report of reduced facial sensation and numbness, precluding adequate mastication of solids and cup sips of liquid. Pharyngeal stage appears functional for administered consistencies with no overt s/s aspiration at bedside

## 2020-07-10 NOTE — PROGRESS NOTE ADULT - ASSESSMENT
71yo LH female with a history of HTN, HLD, hypothyroid, afib (on apixaban), AS, left breast CA s/p mastectomy and chemo 2010, bilateral lung nodules, spinal stimulator for pain ([employer], unclear if MRI compatible) who presented with epilepsia partialis continua, without prior history of epilepsy.      Impression:  1. 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 Most concerning for brain mets given h/o breast CA and lung nodules, with recent 50lb wt loss. However, unable to obtain MRI due to spinal stimulator. No evidence for malignancy per CT w/ contrast A/C/P/H.    2. Left EXT weakness: Resolved. Likely postictal paresis. CT T-L unremarkable. No UMN signs on exam.  3. Facial numbness, masseter atrophy, diffuse numbness/achy pain, cognitive involvement: possible motor neuron disease or demyelinating polyneuropathy?         Recommendation:  - continue levetiracetam 1000mg BID   - continue lacosamide 100mg BID   - lorazepam 2mg IV prn convulsion (grand mal seizure)  - seizure precaution  - outpt eval for possible motor neuron disease or demyelinating polyneuropathy; appt with Dr. Harrison Zavala already made  - needs new referral to oncology; old oncologist moved?  - follow-up with established pulmonologist after discharge   - follow-up with me in clinic: [facility] [place] ([phone]), [address] [place] [address]   - pending rehab discharge       Please contact Epilepsy with further question if needed.   ______________________  Francine Velasquez MD   [facility] Epilepsy  Cell: [phone]

## 2020-07-10 NOTE — DISCHARGE NOTE PROVIDER - CARE PROVIDER_API CALL
Wenceslao Grady)  Hematology; Internal Medicine; Medical Oncology  [address] [place] [address]  Phone: [phone]  Fax: [phone]  Follow Up Time: 2 weeks Wenceslao Grady)  Hematology; Internal Medicine; Medical Oncology  [address] [place] [address]  Phone: [phone]  Fax: [phone]  Follow Up Time: 2 weeks    Harrison Zavala  NEUROLOGY  [address] [place] [id number]  Phone: [phone]  Fax: [phone]  Follow Up Time: 2 weeks    Primary Care doctor,   Phone: (   )    -  Fax: (   )    -  Follow Up Time: 2 weeks

## 2020-07-10 NOTE — DISCHARGE NOTE PROVIDER - NSDCCPCAREPLAN_GEN_ALL_CORE_FT
PRINCIPAL DISCHARGE DIAGNOSIS  Diagnosis: Seizure  Assessment and Plan of Treatment: cont current medication      SECONDARY DISCHARGE DIAGNOSES  Diagnosis: Hypothyroid  Assessment and Plan of Treatment: Hypothyroid    Diagnosis: HTN (hypertension)  Assessment and Plan of Treatment: HTN (hypertension) PRINCIPAL DISCHARGE DIAGNOSIS  Diagnosis: Focal epilepsy  Assessment and Plan of Treatment: Continue Keppra and Vimat as directed. Please follow up with Dr. Zavala.      SECONDARY DISCHARGE DIAGNOSES  Diagnosis: Chronic back pain  Assessment and Plan of Treatment: Continue Lyrica and lidocaine patch    Diagnosis: Atrial fibrillation  Assessment and Plan of Treatment: Continue metoprolol, flecainide, and eliquis as directed    Diagnosis: HTN (hypertension)  Assessment and Plan of Treatment: Continue home medication    Diagnosis: Hypothyroid  Assessment and Plan of Treatment: Continue levothyroxine as directed    Diagnosis: HLD (hyperlipidemia)  Assessment and Plan of Treatment: Continue statin as directed    Diagnosis: Breast cancer  Assessment and Plan of Treatment: Please follow up with Dr. Degroot PRINCIPAL DISCHARGE DIAGNOSIS  Diagnosis: Focal epilepsy  Assessment and Plan of Treatment: Continue Keppra and Vimat as directed. Please follow up with Dr. Zavala. Seizure precautions.      SECONDARY DISCHARGE DIAGNOSES  Diagnosis: Atrial fibrillation  Assessment and Plan of Treatment: Continue metoprolol, flecainide, and eliquis as directed    Diagnosis: HLD (hyperlipidemia)  Assessment and Plan of Treatment: Continue statin as directed    Diagnosis: Breast cancer  Assessment and Plan of Treatment: Please follow up with Dr. Degroot as an outpatient and continue with management.    Diagnosis: HTN (hypertension)  Assessment and Plan of Treatment: Continue with medication as prescribed.    Diagnosis: Chronic back pain  Assessment and Plan of Treatment: and neck pain. Continue Lyrica and lidocaine patch    Diagnosis: Hypothyroid  Assessment and Plan of Treatment: Continue levothyroxine as directed

## 2020-07-10 NOTE — PROGRESS NOTE ADULT - SUBJECTIVE AND OBJECTIVE BOX
Follow up for partial seizures, left  leg twitching and weakness   seen earlier  , Dr Velasquez was at the bedside . pt is c/o some stiffness on the leg ,no pain today     video EEG is completed     Vital Signs Last 24 Hrs  T(C): 36.8 (09 Jul 2020 10:00), Max: 37.3 (09 Jul 2020 05:09)  T(F): 98.3 (09 Jul 2020 10:00), Max: 99.2 (09 Jul 2020 05:09)  HR: 83 (09 Jul 2020 10:39) (71 - 84)  BP: 117/71 (09 Jul 2020 10:00) (95/49 - 117/71)  BP(mean): --  RR: 16 (09 Jul 2020 10:39) (12 - 20)  SpO2: 100% (09 Jul 2020 10:39) (96% - 100%)    GENERAL: NAD, lying in the bed   Neck : limited ROM , tenderness   CHEST/LUNG: Clear to auscultation bilaterally  HEART: Regular rate and rhythm; s1/s2 ; No murmurs, rubs  ABDOMEN: Soft, RUQ tenderness on palpation , Nondistended; Bowel sounds present  EXTREMITIES:  No clubbing, cyanosis, or edema                                              11.4   7.96  )-----------( 313      ( 08 Jul 2020 06:24 )             35.7   07-09    140  |  105  |  13.0  ----------------------------<  90  3.9   |  23.0  |  0.51    Ca    8.5<L>      09 Jul 2020 10:00  Phos  3.4     07-08  Mg     1.7     07-09    TPro  5.7<L>  /  Alb  2.8<L>  /  TBili  <0.2<L>  /  DBili  <0.1  /  AST  23  /  ALT  17  /  AlkPhos  53  07-09          < from: CT Abdomen and Pelvis w/ IV Cont (07.08.20 @ 18:50) >  MPRESSION:    Mildly enlarged subcarinal lymph node, not significantly changed in size since 4/11/2018, otherwise no enlarged lymph nodes within the chest, abdomen or pelvis.    Tree-in-bud opacities in the right lower and upper lobes, slightly improved since 6/16/2020, likely infectious/inflammatory in etiology.    Air in urinary bladder which can be secondary to recent instrumentation/Ford catheter, otherwise correlation is recommended for underlying infection.                LIDIA COBB M.D., ATTENDING RADIOLOGIST  This document has been electronically signed. Jul 9 2020 10:13AM                  < end of copied text >

## 2020-07-11 NOTE — PROGRESS NOTE ADULT - SUBJECTIVE AND OBJECTIVE BOX
Patient is a 70y old  Female who presents with a chief complaint of Status epilepticus (10 Jul 2020 16:25) improved no further seizures       HEALTH ISSUES - PROBLEM Dx:  Hypothyroid: Hypothyroid  HTN (hypertension): HTN (hypertension)  Substance abuse: Substance abuse  Atrial fibrillation: Atrial fibrillation  Encephalopathy: Encephalopathy  Status epilepticus: Status epilepticus      INTERVAL HPI/OVERNIGHT EVENTS:  Patient seen and examined at bedside. No acute events overnight. Patient states she is feeling much better, no further seizures since admission. Feels weak and is tired of needing assistance for mobility. Patient denies chest pain, SOB, abd pain, N/V, fever, chills, dysuria or any other complaints. All remainder ROS negative.       Vital Signs Last 24 Hrs  T(C): 36.8 (11 Jul 2020 07:37), Max: 36.8 (11 Jul 2020 07:37)  T(F): 98.3 (11 Jul 2020 07:37), Max: 98.3 (11 Jul 2020 07:37)  HR: 72 (11 Jul 2020 07:37) (72 - 83)  BP: 111/70 (11 Jul 2020 07:37) (110/66 - 131/79)  BP(mean): --  RR: 18 (11 Jul 2020 07:37) (16 - 18)  SpO2: 99% (11 Jul 2020 07:37) (97% - 99%)      I&O's Summary    10 Jul 2020 07:01  -  11 Jul 2020 07:00  --------------------------------------------------------  IN: 720 mL / OUT: 0 mL / NET: 720 mL      CONSTITUTIONAL: Well appearing, well nourished, awake, alert and in no apparent distress  CARDIAC: irregular irregular  Heart sounds S1, S2.  No murmurs, rubs or gallops   RESPIRATORY: Breath sounds clear and equal bilaterally. No wheezes, rhales or rhonchi  GASTROENTEROLOGY: soft nt nd bs+ normoactive   EXTREMITIES: No edema, cyanosis or deformity   NEUROLOGICAL: Alert and oriented, no focal deficits, no motor or sensory deficits.  SKIN: No rash, skin turgor wnl     MEDICATIONS  (STANDING):  apixaban 5 milliGRAM(s) Oral two times a day  aspirin  chewable 81 milliGRAM(s) Oral daily  atorvastatin 10 milliGRAM(s) Oral at bedtime  escitalopram 10 milliGRAM(s) Oral daily  flecainide 50 milliGRAM(s) Oral two times a day  guaiFENesin  milliGRAM(s) Oral every 12 hours  lacosamide 100 milliGRAM(s) Oral two times a day  levETIRAcetam 1000 milliGRAM(s) Oral two times a day  levothyroxine 112 MICROGram(s) Oral daily  lidocaine   Patch 1 Patch Transdermal every 24 hours  melatonin 3 milliGRAM(s) Oral at bedtime  metoprolol succinate ER 25 milliGRAM(s) Oral daily  polyethylene glycol 3350 17 Gram(s) Oral two times a day  pregabalin 50 milliGRAM(s) Oral three times a day  sucralfate 1 Gram(s) Oral every 12 hours  tamsulosin 0.4 milliGRAM(s) Oral at bedtime    MEDICATIONS  (PRN):  ibuprofen  Tablet. 400 milliGRAM(s) Oral every 8 hours PRN Moderate Pain (4 - 6)  LORazepam   Injectable 2 milliGRAM(s) IV Push every 5 minutes PRN Seizure  oxyCODONE    IR 5 milliGRAM(s) Oral every 6 hours PRN Severe Pain (7 - 10)      LABS:  no new labs       RADIOLOGY & ADDITIONAL TESTS:  no new imaging studies

## 2020-07-11 NOTE — CHART NOTE - NSCHARTNOTEFT_GEN_A_CORE
Source: Patient [X]  Family [ ]   other [X] EMR    Current Diet: Diet, Pureed:   Supplement Feeding Modality:  Oral  Ensure Enlive Cans or Servings Per Day:  3       Frequency:  Daily     PO intake:  < 50% [X]   50-75%  [ ]   %  [ ]  other :    Source for PO intake [X] Patient [ ] family [ ] chart [ ] staff [ ] other    Current Weight:   7/10/2020: 130.7#  7/8/2020: 112.6#  7/5/2020: 114.4#  No edema noted. Question accuracy of wts. Continue to obtain daily wts to monitor trends.     Pertinent Medications: MEDICATIONS  (STANDING):  apixaban 5 milliGRAM(s) Oral two times a day  aspirin  chewable 81 milliGRAM(s) Oral daily  atorvastatin 10 milliGRAM(s) Oral at bedtime  escitalopram 10 milliGRAM(s) Oral daily  flecainide 50 milliGRAM(s) Oral two times a day  guaiFENesin  milliGRAM(s) Oral every 12 hours  lacosamide 100 milliGRAM(s) Oral two times a day  levETIRAcetam 1000 milliGRAM(s) Oral two times a day  levothyroxine 112 MICROGram(s) Oral daily  lidocaine   Patch 1 Patch Transdermal every 24 hours  melatonin 3 milliGRAM(s) Oral at bedtime  metoprolol succinate ER 25 milliGRAM(s) Oral daily  polyethylene glycol 3350 17 Gram(s) Oral two times a day  pregabalin 50 milliGRAM(s) Oral three times a day  sucralfate 1 Gram(s) Oral every 12 hours  tamsulosin 0.4 milliGRAM(s) Oral at bedtime    MEDICATIONS  (PRN):  ibuprofen  Tablet. 400 milliGRAM(s) Oral every 8 hours PRN Moderate Pain (4 - 6)  LORazepam   Injectable 2 milliGRAM(s) IV Push every 5 minutes PRN Seizure  oxyCODONE    IR 5 milliGRAM(s) Oral every 6 hours PRN Severe Pain (7 - 10)    Skin: WDL per EMR.    Nutrition focused physical exam previously conducted - found signs of malnutrition [ ]absent [X]present    Subcutaneous fat loss: [X] Orbital fat pads region, [X]Buccal fat region, [ ]Triceps region,  [ ]Ribs region    Muscle wasting: [ ]Temples region, [X]Clavicle region, [ ]Shoulder region, [ ]Scapula region, [ ]Interosseous region,  [ ]thigh region, [ ]Calf region    Estimated Needs:   [X] no change since previous assessment  [ ] recalculated:     Current Nutrition Diagnosis: Pt remains at high nutrition risk secondary to severe chronic malnutrition related to inadequate protein calorie intake in the setting of difficulty chewing and swallowing as evidenced by PO< 75% est needs > 1 month, 37.8 % weight loss and muscle/fat loss. Aware of SLP rx puree/thin liquids. Follow SLP recommendations. Pt reports having a poor appetite and consuming <50% of meals, but is tolerating diet. Pt reports drinking the Chocolate Ensure TID. Encouraged PO intake. [place] pt preferences. RD to remain available.     Recommendations:   1. Follow SLP rx. Monitor tolerance to diet.   2. Continue Chocolate Ensure TID to optimize PO intake and provide an additional 350kcal and 20g protein per serving.   3. Encourage PO intake.  4. Obtain daily wts as feasible, monitor labs.   5. Honor pt preferences.     Monitoring and Evaluation:   [X] PO intake [X] Tolerance to diet prescription [X] Weights  [X] Follow up per protocol [X] Labs

## 2020-07-11 NOTE — PROGRESS NOTE ADULT - ASSESSMENT
71 y/o female with hx of HTN, HLD, hypothyroid, Afib on Eliquis, AS, breast cancer(in remission), left lung nodule, chronic back/neck pain with spinal stimulator who presented to the ED for LUE/RUE twitching/ tremor with subsequent tonic-clonic seizure lasting about 1 minute. Pt admitted to ICU for airway monitoring given post ictal state and encephelopathy. Empirically started on keppra and vimpat, continued on v EEG which was abnormal. Ct head negative. Pt downgraded from the ICU to the medicine team on 7/5. Currently stable on current seizure medications, cleared by neuro for dispo, evaluated by PT and recommended for [facility], pending insurance auth for dispo planning. Likely monday.      Newly diagnosed focal epilepsy    - no further seizures since admission  - Abnormal EEG   -c/w vimpat po bid  -c/w keppra po bid   -CT of head negative for any findings   -Unable to obtain MRI due to spine stimulator   -c/w seizure precautions   - neuro f/u noted and appreciated   - outpt eval for possible motor neuron disease or demyelinating polyneuropathy; appt with Dr. Harrison Zavala      Atrial fib-paroxysmal   -stable - rate controlled  -c/w metoprolol po qd  -c/w flecainide po bid   -c/w eliquis po bif     h/o breast cancer  -hx of mastectomy    -reports having breast mammogram  last year had not seen her oncologist for many years   -rec to follow with  oncology  after discharge -Dr Grady     Chronic back and Neck pain  -spinal stimulator in place  -c/w lidocaine patch   -c/w lyrica po tid     Hypothyroidsm   - c/w levothyroxine po qd   -stable TSH normal     HLD  -c/w atorvastatin po qhs     DVT ppx:  -covered on eliquis     Dispo: Anticipated to [facility] on monday, pending insurance auth. 69 y/o female with hx of HTN, HLD, hypothyroid, Afib on Eliquis, AS, breast cancer(in remission), left lung nodule, chronic back/neck pain with spinal stimulator who presented to the ED for LUE/RUE twitching/ tremor with subsequent tonic-clonic seizure lasting about 1 minute. Pt admitted to ICU for airway monitoring given post ictal state and encephelopathy. Empirically started on keppra and vimpat, continued on v EEG which was abnormal. Ct head negative. Pt downgraded from the ICU to the medicine team on 7/5. Currently stable on current seizure medications, cleared by neuro for dispo, evaluated by PT and recommended for [facility], pending insurance auth for dispo planning. Likely monday.      Newly diagnosed focal epilepsy    - no further seizures since admission  - Abnormal EEG   -c/w vimpat po bid  -c/w keppra po bid   -CT of head negative for any findings   -Unable to obtain MRI due to spine stimulator   -c/w seizure precautions   - neuro f/u noted and appreciated   - outpt eval for possible motor neuron disease or demyelinating polyneuropathy; appt with Dr. Harrison Zavala      Atrial fib-paroxysmal   -stable - rate controlled  -c/w metoprolol po qd  -c/w flecainide po bid   -c/w eliquis po bif     h/o breast cancer  -hx of mastectomy    -reports having breast mammogram  last year had not seen her oncologist for many years   -rec to follow with  oncology  after discharge -Dr Grady     Chronic back and Neck pain  -spinal stimulator in place  -c/w lidocaine patch   -c/w lyrica po tid     Hypothyroidsm   - c/w levothyroxine po qd   -stable TSH normal     HLD  -c/w atorvastatin po qhs     Protein calorie malnutrition   -nutrition consult noted and appreciated     DVT ppx:  -covered on eliquis     Dispo: Anticipated to [facility] on monday, pending insurance auth.

## 2020-07-12 NOTE — PROGRESS NOTE ADULT - ASSESSMENT
71 y/o female with hx of HTN, HLD, hypothyroid, Afib on Eliquis, AS, breast cancer(in remission), left lung nodule, chronic back/neck pain with spinal stimulator who presented to the ED for LUE/RUE twitching/ tremor with subsequent tonic-clonic seizure lasting about 1 minute. Pt admitted to ICU for airway monitoring given post ictal state and encephelopathy. Empirically started on keppra and vimpat, continued on v EEG which was abnormal. Ct head negative. Pt downgraded from the ICU to the medicine team on 7/5. Currently stable on current seizure medications, cleared by neuro for dispo, evaluated by PT and recommended for [facility], pending insurance authorization for dispo planning, anticipated for tomorrow.      Newly diagnosed focal epilepsy    - no further seizures since admission  - Abnormal EEG   -c/w vimpat po bid  -c/w keppra po bid   -CT of head negative for any findings   -Unable to obtain MRI due to spine stimulator   -c/w seizure precautions   - neuro f/u noted and appreciated   - outpt eval for possible motor neuron disease or demyelinating polyneuropathy; appt with Dr. Harrison Zavala      Atrial fib-paroxysmal   -stable - rate controlled  -c/w metoprolol po qd  -c/w flecainide po bid   -c/w eliquis po bif     h/o breast cancer  -hx of mastectomy    -reports having breast mammogram  last year had not seen her oncologist for many years   -rec to follow with  oncology  after discharge -Dr Grady     Chronic back and Neck pain  -spinal stimulator in place  -c/w lidocaine patch   -c/w lyrica po tid     Hypothyroidsm   - c/w levothyroxine po qd   -stable TSH normal     HLD  -c/w atorvastatin po qhs     Protein calorie malnutrition   -nutrition consult noted and appreciated     DVT ppx:  -covered on eliquis     Activity level: Ambulate as tolerated with assistance/ walker     Dispo: Anticipated to [facility] hopefully tomorrow, pending insurance auth.

## 2020-07-12 NOTE — PROGRESS NOTE ADULT - PROVIDER SPECIALTY LIST ADULT
Epilepsy
Hospitalist
Critical Care
Hospitalist
Hospitalist

## 2020-07-12 NOTE — PROGRESS NOTE ADULT - REASON FOR ADMISSION
Status epilepticus

## 2020-07-12 NOTE — PROGRESS NOTE ADULT - SUBJECTIVE AND OBJECTIVE BOX
Patient is a 70y old  Female who presents with a chief complaint of Status epilepticus (11 Jul 2020 15:34) no further seizures       HEALTH ISSUES - PROBLEM Dx:  Hypothyroid: Hypothyroid  HTN (hypertension): HTN (hypertension)  Substance abuse: Substance abuse  Atrial fibrillation: Atrial fibrillation  Encephalopathy: Encephalopathy  Status epilepticus: Status epilepticus          INTERVAL HPI/OVERNIGHT EVENTS:  Patient seen and examined at bedside. No acute events overnight. Patient states she is feeling well, has no complaints just wants to get to rehab. Feels very wobbly when she tries to stand and walk. Requesting walker, states she is eating well and had a bm today. Patient denies chest pain, SOB, abd pain, N/V, fever, chills, dysuria or any other complaints. All remainder ROS negative.     Vital Signs Last 24 Hrs  T(C): 36.6 (12 Jul 2020 08:14), Max: 36.8 (11 Jul 2020 15:20)  T(F): 97.9 (12 Jul 2020 08:14), Max: 98.2 (11 Jul 2020 15:20)  HR: 82 (12 Jul 2020 08:14) (68 - 86)  BP: 107/70 (12 Jul 2020 08:14) (95/62 - 120/75)  BP(mean): --  RR: 18 (12 Jul 2020 08:14) (18 - 18)  SpO2: 96% (12 Jul 2020 08:14) (96% - 100%)      CONSTITUTIONAL: Well appearing, well nourished, awake, alert and in no apparent distress  CARDIAC: irregular irregular  Heart sounds S1, S2.  No murmurs, rubs or gallops   RESPIRATORY: Breath sounds clear and equal bilaterally. No wheezes, rhales or rhonchi  GASTROENTEROLOGY: soft nt nd bs+ normoactive   EXTREMITIES: No edema, cyanosis or deformity   NEUROLOGICAL: Alert and oriented, no focal deficits, no motor or sensory deficits.  SKIN: No rash, skin turgor wnl       MEDICATIONS  (STANDING):  apixaban 5 milliGRAM(s) Oral two times a day  aspirin  chewable 81 milliGRAM(s) Oral daily  atorvastatin 10 milliGRAM(s) Oral at bedtime  escitalopram 10 milliGRAM(s) Oral daily  flecainide 50 milliGRAM(s) Oral two times a day  guaiFENesin  milliGRAM(s) Oral every 12 hours  lacosamide 100 milliGRAM(s) Oral two times a day  levETIRAcetam 1000 milliGRAM(s) Oral two times a day  levothyroxine 112 MICROGram(s) Oral daily  lidocaine   Patch 1 Patch Transdermal every 24 hours  melatonin 3 milliGRAM(s) Oral at bedtime  metoprolol succinate ER 25 milliGRAM(s) Oral daily  polyethylene glycol 3350 17 Gram(s) Oral two times a day  pregabalin 50 milliGRAM(s) Oral three times a day  sucralfate 1 Gram(s) Oral every 12 hours  tamsulosin 0.4 milliGRAM(s) Oral at bedtime    MEDICATIONS  (PRN):  ibuprofen  Tablet. 400 milliGRAM(s) Oral every 8 hours PRN Moderate Pain (4 - 6)  oxyCODONE    IR 5 milliGRAM(s) Oral every 6 hours PRN Severe Pain (7 - 10)      LABS:  No new labs       RADIOLOGY & ADDITIONAL TESTS:  No new imaging studies

## 2020-07-13 NOTE — PROVIDER CONTACT NOTE (MEDICATION) - SITUATION
Pt has hx of HTN, BP trends soft. BP 98/52 manual & HR 69, pt gets metoprolol ER 25mg (no parameters). Should I give?

## 2020-07-13 NOTE — DISCHARGE NOTE NURSING/CASE MANAGEMENT/SOCIAL WORK - PATIENT PORTAL LINK FT
You can access the FollowMyHealth Patient Portal offered by [facility] by registering at the following website: [email or web address]. By joining [facility]’s FollowMyHealth portal, you will also be able to view your health information using other applications (apps) compatible with our system.

## 2020-08-06 PROBLEM — Z09 HOSPITAL DISCHARGE FOLLOW-UP: Status: ACTIVE | Noted: 2020-01-01

## 2020-08-06 PROBLEM — I48.91 ATRIAL FIBRILLATION, UNSPECIFIED TYPE: Status: ACTIVE | Noted: 2019-01-01

## 2020-08-06 PROBLEM — M54.9 CHRONIC BACK PAIN: Status: ACTIVE | Noted: 2019-01-01

## 2020-08-06 PROBLEM — G40.909 EPILEPSY: Status: ACTIVE | Noted: 2020-01-01

## 2020-08-06 NOTE — HISTORY OF PRESENT ILLNESS
[Post-hospitalization from ___ Hospital] : Post-hospitalization from [unfilled] Hospital [Discharged on ___] : discharged on [unfilled] [Admitted on: ___] : The patient was admitted on [unfilled] [Discharge Summary] : discharge summary [Radiology Findings] : radiology findings [Pertinent Labs] : pertinent labs [Other: ____] : [unfilled] [Discharge Med List] : discharge medication list [Med Reconciliation] : medication reconciliation has been completed [Patient Contacted By: ____] : and contacted by [unfilled] [FreeTextEntry2] : 71 y/o female pmhx HTN, HLD, hypothyroid, Afib on Eliquis, AS, breast cancer( in remission), left lung nodule, spinal stimulator presented to ED for LUE twitching/ tremor. Patient had RUE twitching/ Tremor subsequently developing \par tonic-clonic seizure  lasting about 1 minute. Admitted to ICU for airway monitoring given post ictal state and encephelopathy. Started on IV keppra, neurology consulted. Pt is with intermittent left leg twitching, video EEG \par completed and seizure activity was noted. Pt can not have MRI due to spinal stimulator. Ct of head x2 unremarkable, CT of C /abd pelvis showed no mass, constipation stool softener laxative given. Patient's Afib controlled on metoprolol po qd, flecainide po bid and eliquis po bid. Hypothyroidism was stable, continued on levothyroxine po qd. Patient put on Vimpat po bid and Keppra po bid. No seizures since admission. Leg numbness/twitching much improved. Outpt eval for possible motor neuron disease or demyelinating polyneuropathy; appt with Dr. Harrison Zavala. Recommended to the patient to follow up with Dr. Degroot due to history of breast cancer.  Patient was cleared by Neuro, Patient was discharge to [facility], where was d/c home on 7/27/20.\par Pt states can't afford Vimpat. Has appointment with Neurology 9/3/20.\par Meds adjusted at rehab.

## 2020-08-06 NOTE — HEALTH RISK ASSESSMENT
[Intercurrent hospitalizations] : was admitted to the hospital  [No falls in past year] : Patient reported no falls in the past year [No] : In the past 12 months have you used drugs other than those required for medical reasons? No [Alone] : lives alone [None] : None [Fully functional (using the telephone, shopping, preparing meals, housekeeping, doing laundry, using] : Fully functional and needs no help or supervision to perform IADLs (using the telephone, shopping, preparing meals, housekeeping, doing laundry, using transportation, managing medications and managing finances) [Fully functional (bathing, dressing, toileting, transferring, walking, feeding)] : Fully functional (bathing, dressing, toileting, transferring, walking, feeding) [Safety elements used in home] : safety elements used in home [Smoke Detector] : smoke detector [Seat Belt] :  uses seat belt [Handling Complex Tasks] : difficulty handling complex tasks [] : No [Change in mental status noted] : No change in mental status noted [Language] : denies difficulty with language [Reports changes in hearing] : Reports no changes in hearing [Reports changes in vision] : Reports no changes in vision [TB Exposure] : is not being exposed to tuberculosis [Reports changes in dental health] : Reports no changes in dental health

## 2020-08-06 NOTE — ASSESSMENT
[FreeTextEntry1] : Pt presenst today for Hospital f/up\par \par Epilepsy:\par -On Vimpat and Keppra> States is unable to afford keppra.\par -Appointment to F/up with Dr Velasquez on 9/3/20 and Dr montano 9/10/20\par -Pt advise to f/up earlier due to lack of meds.\par \par sciatic pain in left LE:/ Radiculopathy\par -On Lyrica 400mg tid.\par -Percocet bid prn. refill> urine toxicology\par -Seen by Pain Management> Dr Chahal> pending Lumbar CT\par -refused PT referral.\par \par Unintentional weight loss/ BM changes:\par -Pending Colonoscopy> FOBT: negative 01/20\par -Oncology evaluation advise (Hx Breast Cancer in remission)\par \par COPD/ Hx Pulmonary Nodules?> Opacities in RTLL and LeftLL\par -F/up with Pulminology, Dr Carter\par -Last CT LDCT 02/20/18\par \par Neck pain \par -Pt under works comp case.\par -Seen by Neurosx, Dr Rusty Ponce in [facility].\par -S/P Neurostimulator\par -On Neurontin 300mg tid.\par \par Immunizations:\par -Flu and Prevnar : up to date\par -Shingrix : on hold\par \par HCM:\par -6/20/19\par \par Mammo: 2019\par \par Gyn: 2019\par \par Colonoscopy: needs to reschedule\par \par Hx Breast Cancer in remission;\par -f/up w/ oncology Dr Grady.\par \par  Osteoporosis:\par -On Ibandronate mthly.\par \par A.Fib:\par -Cardiology f/up: Dr Aragon/ [facility]> new cardiology referral.\par -D/C flecainide \par -On metoprolol\par -On Eliquis\par \par Hypothyroidism:\par -on synthroid 112mcg daily\par -TFT today\par \par Depression:\par -On lexapro 10mg.\par -Doing well.\par \par Right facial numbness> TIA\par -Seen by neurology , Dr Ng.\par -CT scan normal.\par -Normal Doppler\par \par Hx Shingles with Neuropathy:\par -Use Lidoderm patch prn.\par \par

## 2020-08-06 NOTE — PHYSICAL EXAM
[Normal] : soft, non-tender, non-distended, no masses palpated, no HSM and normal bowel sounds [No Focal Deficits] : no focal deficits [39776 - Moderate Complexity requires multiple possible diagnoses and/or the management options, moderate complexity of the medical data (tests, etc.) to be reviewed, and moderate risk of significant complications, morbidity, and/or mortality as well as co] : Moderate Complexity [de-identified] : slurred speech/ Gait imbalance

## 2020-08-17 PROBLEM — M19.90 UNSPECIFIED OSTEOARTHRITIS, UNSPECIFIED SITE: Chronic | Status: ACTIVE | Noted: 2020-01-01

## 2020-08-17 PROBLEM — E78.5 HYPERLIPIDEMIA, UNSPECIFIED: Chronic | Status: ACTIVE | Noted: 2020-01-01

## 2020-08-17 PROBLEM — I10 ESSENTIAL (PRIMARY) HYPERTENSION: Chronic | Status: ACTIVE | Noted: 2020-01-01

## 2020-08-17 PROBLEM — I48.91 UNSPECIFIED ATRIAL FIBRILLATION: Chronic | Status: ACTIVE | Noted: 2020-01-01

## 2020-08-17 PROBLEM — C50.919 MALIGNANT NEOPLASM OF UNSPECIFIED SITE OF UNSPECIFIED FEMALE BREAST: Chronic | Status: ACTIVE | Noted: 2020-01-01

## 2020-08-17 PROBLEM — R91.1 SOLITARY PULMONARY NODULE: Chronic | Status: ACTIVE | Noted: 2020-01-01

## 2020-08-17 PROBLEM — I35.0 NONRHEUMATIC AORTIC (VALVE) STENOSIS: Chronic | Status: ACTIVE | Noted: 2020-01-01

## 2020-08-17 PROBLEM — Z96.89 PRESENCE OF OTHER SPECIFIED FUNCTIONAL IMPLANTS: Chronic | Status: ACTIVE | Noted: 2020-01-01

## 2020-08-27 NOTE — ED ADULT NURSE NOTE - OBJECTIVE STATEMENT
Pt BIBA after neighbors found pt outside naked. As per son, pt LKW was 3 days ago and pt has been increasingly becoming altered. Pt son also states pt has hx of substance abuse and is currenly taking percocet PO. Pt unreliable source for information due to current altered state.
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
## 2020-08-27 NOTE — H&P ADULT - NSHPPHYSICALEXAM_GEN_ALL_CORE
General: pt. lying in bed not in distress.  HEENT: AT, NC. PERRL. intact EOM. no throat erythema or exudate. oral mucosa appears dry.   Neck: supple. no JVD.   Chest: CTA bilaterally  Heart: S1,S2. RRR. no heart murmur. no edema.   Abdomen: soft. pt. not in pain on abd. exam. non-distended. + BS.   Ext: pt. noted to move all her ext without restriction. distal pulses intact.   Neuro: pt. somewhat restless , not giving good history but AAO x3. non focal. motor appears intact B/L.  Skin: no rash noted, no pallor, no diaphoresis.  Psych : somewhat restless, pulled iv line before, no agitation to staff at the time of admission. no si/hi.

## 2020-08-27 NOTE — ED ADULT NURSE NOTE - NSIMPLEMENTINTERV_GEN_ALL_ED
Implemented All Fall Risk Interventions:  Ridge to call system. Call bell, personal items and telephone within reach. Instruct patient to call for assistance. Room bathroom lighting operational. Non-slip footwear when patient is off stretcher. Physically safe environment: no spills, clutter or unnecessary equipment. Stretcher in lowest position, wheels locked, appropriate side rails in place. Provide visual cue, wrist band, yellow gown, etc. Monitor gait and stability. Monitor for mental status changes and reorient to person, place, and time. Review medications for side effects contributing to fall risk. Reinforce activity limits and safety measures with patient and family.

## 2020-08-27 NOTE — H&P ADULT - ASSESSMENT
pt. is admitted for     - AMS unspecified type, possible dehydration contributing, uti ? iv fluids and rocephin for now, follow urine, blood cx. will request for TFT, vitamin b12 and folate level. Neurology consult [facility].     - Seizure disorder, no reported seizure, will continue with keppra and vimpat, seizure precautions, prn ativan for seizure activity.    - PAF, will continue with eliquis. ct head with no acute findings. trell on board.      - Essential htn, continue toprol xl.     - Hypothyroidism, continue synthroid.

## 2020-08-27 NOTE — ED ADULT NURSE NOTE - PMH
Aortic stenosis    Atrial fibrillation    Breast CA    HLD (hyperlipidemia)    HTN (hypertension)    Lung nodule    OA (osteoarthritis)    S/P insertion of spinal cord stimulator

## 2020-08-27 NOTE — ED ADULT TRIAGE NOTE - CHIEF COMPLAINT QUOTE
pt awake and alert, confused as to time and place, found outside by neighbors naked. pt with slurred speech, per son on phone, LKW 3days ago, reports pt has hx of substance abuse and is currently on percocet s/p oral surgery. MD called for immediate eval.

## 2020-08-27 NOTE — ED PROVIDER NOTE - OBJECTIVE STATEMENT
69 yo female BIBEMS  after being found outside naked by her neighbors.  Patient does not remember what happened.  Per son, patient was last found to be at her baseline 2 days ago.  Son states that she is not herself and has exacerbation of her slurred speech which has been going on for months and states that her affect is different.  Patient denies any complaints.

## 2020-08-27 NOTE — ED PROVIDER NOTE - CADM POA PRESS ULCER
HPI:  Opal Perrin is a 6  y.o. 6  m.o. female who presents with illness.  She was seen in the ER for vomiting.  Here for follow up.  Ongoing 3 months or so-- per mom, she vomits for several days (2-3 times/day), Nonbloody, nonbilious emesis, then resolves.  No diarrhea at all with any of these episodes.  Went to ER for this, urine was negative for infection.  Needs GI referral/ further workup.   Usually has episodes twice monthly.  Mom has migraines and vomits with these.  However, Opal doesn't complain of headaches.  Not just vomiting in the mornings, happens all day for several days, then resolves.  Just before she vomits, she c/o abdominal pain (generalized).  Nothing makes this better or worse.  Mom has tried to pinpoint a trigger, but there isn't one.  Hx of milk intolerance in the past, but doesn't drink milk.  No fever during episodes.  Other family members are not sick at the time, etc.        History reviewed. No pertinent past medical history.    History reviewed. No pertinent surgical history.    Family History   Problem Relation Age of Onset    Asthma Brother     Hypertension Maternal Grandfather     Heart disease Maternal Grandfather     Heart disease Paternal Grandfather        Social History     Social History    Marital status: Single     Spouse name: N/A    Number of children: N/A    Years of education: N/A     Social History Main Topics    Smoking status: Never Smoker    Smokeless tobacco: Never Used    Alcohol use No    Drug use: No    Sexual activity: Not Asked     Other Topics Concern    None     Social History Narrative    Opal lives with mom and step dad and brother, dad every other week   No pets in home  Dad mom smokes in home   Mom is a .       There is no problem list on file for this patient.      Reviewed Past Medical History, Social History, and Family History-- updated as needed    ROS:  Constitutional: no decreased activity  Head, Ears, Eyes, Nose,  Throat: no ear discharge  Respiratory: no difficulty breathing  GI: no constipation or diarrhea    PHYSICAL EXAM:  APPEARANCE: No acute distress, nontoxic appearing, well appearing, smiling, interactive  SKIN: No obvious rashes  HEAD: Nontraumatic  NECK: Supple  EYES: Conjunctivae clear, no discharge  EARS: Clear canals, Tympanic membranes pearly bilaterally  NOSE: No discharge  MOUTH & THROAT:  Moist mucous membranes, No tonsillar enlargement, No pharyngeal erythema or exudates  CHEST: Lungs clear to auscultation, no grunting/flaring/retracting  CARDIOVASCULAR: Regular rate and rhythm without murmur, capillary refill less than 2 seconds  GI: Soft, non tender, non distended, no hepatosplenomegaly  MUSCULOSKELETAL: Moves all extremities well  NEUROLOGIC: alert, interactive      Opal was seen today for follow-up.    Diagnoses and all orders for this visit:    Recurrent vomiting  -     Stool culture; Future  -     Stool Exam-Ova,Cysts,Parasites; Future  -     Giardia / Cryptosporidum, EIA; Future  -     Occult blood x 1, stool; Future  -     H. pylori antigen, stool; Future  -     Sedimentation rate, manual; Future  -     Comprehensive metabolic panel; Future  -     TSH; Future  -     IgA; Future  -     Tissue transglutaminase, IgA; Future  -     CBC auto differential; Future  -     Ambulatory referral to Pediatric Gastroenterology  -     ALLERGEN MILK; Future    Generalized abdominal pain  -     Stool culture; Future  -     Stool Exam-Ova,Cysts,Parasites; Future  -     Giardia / Cryptosporidum, EIA; Future  -     Occult blood x 1, stool; Future  -     H. pylori antigen, stool; Future  -     Sedimentation rate, manual; Future  -     Comprehensive metabolic panel; Future  -     TSH; Future  -     IgA; Future  -     Tissue transglutaminase, IgA; Future  -     CBC auto differential; Future  -     Ambulatory referral to Pediatric Gastroenterology  -     ALLERGEN MILK; Future          ASSESSMENT:  1. Recurrent vomiting     2. Generalized abdominal pain        PLAN:  1.  New recurrent vomiting episodes about every 2 hours.  Possibly cyclical vomiting, mom has migraines.  But need to r/o other causes, see above- screening for inflammatory causes, milk allergy, celiac, etc.  Stool studies to r/o parasites, etc.  Will refer to peds GI.  Mom to keep a diary of when episodes occur.         No

## 2020-08-27 NOTE — ED PROVIDER NOTE - CLINICAL SUMMARY MEDICAL DECISION MAKING FREE TEXT BOX
69 yo female presenting with AMS: differential includes intracranial pathology vs infection vs drug effect; will check labs urine ekg cxr ct head --> admit

## 2020-08-27 NOTE — ED PROVIDER NOTE - PHYSICAL EXAMINATION
gen: well appearing  Mentation: AAO x 3  psych: mood appropriate  teeth: multiple missing teeth   ENT: airway patent  Eyes: conjunctivae clear bilaterally  Cardio: tachycardic, no m/r/g  Resp: normal BS b/l  GI: s/nt/nd   Neuro: AAO x 3, sensation and motor function intact,  MSK: normal movement of all extremities

## 2020-08-27 NOTE — H&P ADULT - NSICDXPASTMEDICALHX_GEN_ALL_CORE_FT
PAST MEDICAL HISTORY:  Aortic stenosis     Atrial fibrillation     Breast CA     HLD (hyperlipidemia)     HTN (hypertension)     Lung nodule     OA (osteoarthritis)     S/P insertion of spinal cord stimulator PAST MEDICAL HISTORY:  Aortic stenosis     Atrial fibrillation     Breast CA     HLD (hyperlipidemia)     HTN (hypertension)     Lung nodule     OA (osteoarthritis)     S/P insertion of spinal cord stimulator     Seizure
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
## 2020-08-28 NOTE — CONSULT NOTE ADULT - SUBJECTIVE AND OBJECTIVE BOX
[facility]                                     Ne[facility] [place]                                 [facility] [address]                                  [address] [facility] [address]                                        [place], [place], [address]                                             [phone]    CC: AMS  HPI:  The patient is a 70y Female who presented with AMS.  Per patient she went outside, was not aware that she was naked.  She was seen by a neighbor and was taken to hospital.  She is mostly amnestic of event.  She was seen last month with new diagnosis of seizure.  She denies having had seizure yesterday.  Neurology is asked to evaluate    PAST MEDICAL & SURGICAL HISTORY:  Seizure  S/P insertion of spinal cord stimulator  Lung nodule  Aortic stenosis  Breast CA  OA (osteoarthritis)  Atrial fibrillation  HLD (hyperlipidemia)  HTN (hypertension)  H/O left mastectomy      MEDICATIONS  (STANDING):  apixaban 5 milliGRAM(s) Oral two times a day  aspirin enteric coated 81 milliGRAM(s) Oral daily  atorvastatin 10 milliGRAM(s) Oral at bedtime  cefTRIAXone   IVPB 1000 milliGRAM(s) IV Intermittent every 24 hours  escitalopram 10 milliGRAM(s) Oral daily  lacosamide 100 milliGRAM(s) Oral two times a day  levETIRAcetam  IVPB 1000 milliGRAM(s) IV Intermittent every 12 hours  levothyroxine 112 MICROGram(s) Oral daily  metoprolol succinate ER 25 milliGRAM(s) Oral daily  pregabalin 50 milliGRAM(s) Oral two times a day  sodium chloride 0.9%. 1000 milliLiter(s) (100 mL/Hr) IV Continuous <Continuous>  tamsulosin 0.4 milliGRAM(s) Oral at bedtime    MEDICATIONS  (PRN):  haloperidol    Injectable 0.5 milliGRAM(s) IntraMuscular every 6 hours PRN Agitation  LORazepam   Injectable 1 milliGRAM(s) IV Push once PRN for seizure activity.      Allergies    No Known Allergies    Intolerances    chocolate ensure please. (Unknown)      SOCIAL HISTORY:  former tob,   former alcohol   no drugs    FAMILY HISTORY:  No seizure in parents    ROS: 14 point ROS negative other than what is present in HPI or below    Vital Signs Last 24 Hrs  T(C): 36.5 (28 Aug 2020 04:23), Max: 36.7 (27 Aug 2020 15:08)  T(F): 97.7 (28 Aug 2020 04:23), Max: 98.1 (27 Aug 2020 15:08)  HR: 103 (28 Aug 2020 04:23) (103 - 126)  BP: 90/58 (28 Aug 2020 04:23) (90/58 - 144/92)  BP(mean): 68 (28 Aug 2020 04:23) (68 - 68)  RR: 18 (28 Aug 2020 04:23) (18 - 20)  SpO2: 96% (28 Aug 2020 04:23) (92% - 96%)      General: NAD    Detailed Neurologic Exam:    Mental status: The patient is awake and alert and has normal attention span.  The patient is fully oriented in 3 spheres. The patient is oriented to current events. The patient is able to name objects, follow commands, repeat sentences. (+) dysarthria    Cranial nerves: Pupils equal and react symmetrically to light. There is no visual field deficit to confrontation. Extraocular motion is full with no nystagmus. There is no ptosis. Facial sensation is intact. Facial musculature is symmetric. Palate elevates symmetrically. Shoulder shrug is normal. Tongue is midline.    Motor: There is normal bulk and tone.  There is no tremor.  No focal weakness in 4 extremities    Sensation: Intact to light touch and pin in 4 extremities    Reflexes: 2+ throughout and plantar responses are flexor.    Cerebellar: There is no dysmetria on finger to nose testing.    Gait : deferred    LABS:                         10.5   12.36 )-----------( Clumped    ( 28 Aug 2020 07:54 )             33.3       08-28    133<L>  |  96<L>  |  17.0  ----------------------------<  116<H>  3.6   |  24.0  |  0.74    Ca    9.1      28 Aug 2020 07:54    TPro  8.2  /  Alb  3.8  /  TBili  0.3<L>  /  DBili  x   /  AST  26  /  ALT  13  /  AlkPhos  83  08-27      PT/INR - ( 27 Aug 2020 19:42 )   PT: 15.5 sec;   INR: 1.36 ratio         PTT - ( 27 Aug 2020 19:42 )  PTT:32.6 sec    Urinalysis Basic - ( 27 Aug 2020 19:24 )    Color: Yellow / Appearance: Clear / S.020 / pH: x  Gluc: x / Ketone: Large  / Bili: Negative / Urobili: Negative mg/dL   Blood: x / Protein: 100 mg/dL / Nitrite: Negative   Leuk Esterase: Negative / RBC: 0-2 /HPF / WBC 0-2   Sq Epi: x / Non Sq Epi: Occasional / Bacteria: Occasional        RADIOLOGY & ADDITIONAL STUDIES (independently reviewed unless otherwise noted):  Head CT no acute stroke, mass or bleed

## 2020-08-28 NOTE — CONSULT NOTE ADULT - ASSESSMENT
calling dr hall on pt The patient is a 70y Female who is followed by neurology because of ams    AMS  elevated WBC on admission  possible seizure vs infection  will increase keppra to 1500 mg bid  check EEG    will follow with you    Masoud Dominique MD PhD   [id number]

## 2020-08-28 NOTE — PROGRESS NOTE ADULT - ASSESSMENT
71 y/o female BIBA for AMS as per history pt. was found naked  and confused by neighbor and ambulance was called. At the time of admission pt. stated that she is ok, po intake has been poor. no abd. pain. no n/v/d per history. no fever. pt. not giving good history and history obtained from ER chart and ER physician. pt. reports no fall. It appears pt. is more alert in the ER likely after getting hydration. At the time of admission pt. knows where she is at, year 2020 and Nikita is the president. Patient states that she can't recall events leading to her admission. Currently she remains AOx3. She currently is being treated for UTI and on IV fluids for elevated lactate from admission that is now improved. Discussed with son who returns on Monday and is also inquiring on home aids.    #acute metabolic encephalopathy  - appears to be improved  - continue rocephin  - follow up urine cultures  - all other electrolytes and TSH, B12 normal  - continue AED's for now    #Seizure disorder, no reported seizure, will continue with keppra and vimpat, seizure precautions, prn ativan for seizure activity.    #PAF, will continue with eliquis. ct head with no acute findings. b.bl on board.      #Essential htn, continue toprol xl.     #Hypothyroidism, continue synthroid    DISPO: complete antibiotics tomorrow for UTI. PT to see; possibly discharge home on Monday

## 2020-08-29 NOTE — PROGRESS NOTE ADULT - ASSESSMENT
69 y/o female BIBA for AMS as per history pt. was found naked  and confused by neighbor and ambulance was called. At the time of admission pt. stated that she is ok, po intake has been poor. no abd. pain. no n/v/d per history. no fever. pt. not giving good history and history obtained from ER chart and ER physician. pt. reports no fall. It appears pt. is more alert in the ER likely after getting hydration. At the time of admission pt. knows where she is at, year 2020 and Nikita is the president. Patient states that she can't recall events leading to her admission. Currently she remains AOx3. She currently is being treated for UTI and on IV fluids for elevated lactate from admission that is now improved. Discussed with son who returns on Monday and is also inquiring on home aids.    #acute metabolic encephalopathy - mostly improved  - appears to be improved  - continue rocephin total of 3 days (finishes today)  - follow up final urine cultures  - all other electrolytes and TSH, B12 normal  - continue AED's for now    #Seizure disorder, no reported seizure, will continue with keppra and vimpat, seizure precautions, prn ativan for seizure activity.  - neurology consult appreciated  - keppra increased to 1500mg BID    #PAF, will continue with eliquis. ct head with no acute findings. trell on board.      #Essential htn, continue toprol xl.     #Hypothyroidism, continue synthroid    DISPO: complete antibiotics tomorrow for UTI. PT to see; possibly discharge home on Monday
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
## 2020-08-29 NOTE — PROGRESS NOTE ADULT - ASSESSMENT
The patient is a 70y Female who is followed by neurology because of ams    AMS  elevated WBC on admission  possible seizure vs infection  continue keppra to 1500 mg bid, vimpat 100 mg bid  await EEG    will follow with you    Masoud Dominique MD PhD   [id number]

## 2020-08-29 NOTE — PROGRESS NOTE ADULT - SUBJECTIVE AND OBJECTIVE BOX
[facility]                                     [facility] [place]                                 [facility]                                  [address]                                        [place], [place], [address]                                             [phone]    CC: AMS  HPI:  The patient is a 70y Female who presented with AMS.  Per patient she went outside, was not aware that she was naked.  She was seen by a neighbor and was taken to hospital.  She is mostly amnestic of event.  She was seen last month with new diagnosis of seizure.  She denies having had seizure yesterday.  Neurology is asked to evaluate    Interval history: more alert today.    ROS neurology: Denies headache or dizziness. Denies weakness/numbness.  Denies speech/language deficits. Denies diplopia/blurred vision.  Denies confusion    MEDICATIONS  (STANDING):  apixaban 5 milliGRAM(s) Oral two times a day  aspirin enteric coated 81 milliGRAM(s) Oral daily  atorvastatin 10 milliGRAM(s) Oral at bedtime  cefTRIAXone   IVPB 1000 milliGRAM(s) IV Intermittent every 24 hours  escitalopram 10 milliGRAM(s) Oral daily  lacosamide 100 milliGRAM(s) Oral two times a day  levETIRAcetam 1500 milliGRAM(s) Oral two times a day  levothyroxine 112 MICROGram(s) Oral daily  metoprolol succinate ER 25 milliGRAM(s) Oral daily  pregabalin 50 milliGRAM(s) Oral two times a day  sodium chloride 0.9%. 1000 milliLiter(s) (100 mL/Hr) IV Continuous <Continuous>  tamsulosin 0.4 milliGRAM(s) Oral at bedtime    MEDICATIONS  (PRN):  haloperidol    Injectable 0.5 milliGRAM(s) IntraMuscular every 6 hours PRN Agitation  LORazepam   Injectable 1 milliGRAM(s) IV Push once PRN for seizure activity.      Vital Signs Last 24 Hrs  T(C): 36.7 (29 Aug 2020 05:33), Max: 36.9 (28 Aug 2020 19:30)  T(F): 98.1 (29 Aug 2020 05:33), Max: 98.5 (28 Aug 2020 19:30)  HR: 91 (29 Aug 2020 05:33) (79 - 108)  BP: 99/64 (29 Aug 2020 05:33) (99/64 - 131/82)  BP(mean): --  RR: 18 (29 Aug 2020 05:33) (18 - 18)  SpO2: 98% (29 Aug 2020 05:33) (95% - 99%)      Detailed Neurologic Exam:    Mental status: The patient is awake and alert and has normal attention span.  The patient is fully oriented in 3 spheres. The patient is oriented to current events. The patient is able to name objects, follow commands, repeat sentences. (+) dysarthria improved today    Cranial nerves: Pupils equal and react symmetrically to light. There is no visual field deficit to confrontation. Extraocular motion is full with no nystagmus. There is no ptosis. Facial sensation is intact. Facial musculature is symmetric. Palate elevates symmetrically. Shoulder shrug is normal. Tongue is midline.    Motor: There is normal bulk and tone.  There is no tremor.  No focal weakness in 4 extremities    Sensation: Intact to light touch and pin in 4 extremities    Reflexes: 2+ throughout and plantar responses are flexor.    Cerebellar: There is no dysmetria on finger to nose testing.    Gait : deferred    LABS:                             10.5   12.36 )-----------( Clumped    ( 28 Aug 2020 07:54 )             33.3     08-28    133<L>  |  96<L>  |  17.0  ----------------------------<  116<H>  3.6   |  24.0  |  0.74    Ca    9.1      28 Aug 2020 07:54    TPro  8.2  /  Alb  3.8  /  TBili  0.3<L>  /  DBili  x   /  AST  26  /  ALT  13  /  AlkPhos  83  08-27    LIVER FUNCTIONS - ( 27 Aug 2020 15:45 )  Alb: 3.8 g/dL / Pro: 8.2 g/dL / ALK PHOS: 83 U/L / ALT: 13 U/L / AST: 26 U/L / GGT: x           PT/INR - ( 27 Aug 2020 19:42 )   PT: 15.5 sec;   INR: 1.36 ratio         PTT - ( 27 Aug 2020 19:42 )  PTT:32.6 sec    Urinalysis Basic - ( 27 Aug 2020 19:24 )    Color: Yellow / Appearance: Clear / S.020 / pH: x  Gluc: x / Ketone: Large  / Bili: Negative / Urobili: Negative mg/dL   Blood: x / Protein: 100 mg/dL / Nitrite: Negative   Leuk Esterase: Negative / RBC: 0-2 /HPF / WBC 0-2   Sq Epi: x / Non Sq Epi: Occasional / Bacteria: Occasional        RADIOLOGY & ADDITIONAL STUDIES (independently reviewed unless otherwise noted):  Head CT no acute stroke, mass or bleed

## 2020-08-30 NOTE — PROGRESS NOTE ADULT - ASSESSMENT
69 y/o female BIBA for AMS as per history pt. was found naked  and confused by neighbor and ambulance was called. At the time of admission pt. stated that she is ok, po intake has been poor. no abd. pain. no n/v/d per history. no fever. pt. not giving good history and history obtained from ER chart and ER physician. pt. reports no fall. It appears pt. is more alert in the ER likely after getting hydration. At the time of admission pt. knows where she is at, year 2020 and Nikita is the president. Patient states that she can't recall events leading to her admission. Currently she remains AOx3. She currently is being treated for UTI and on IV fluids for elevated lactate from admission that is now improved. Discussed with son who returns on Monday and is also inquiring on home aids.    #acute metabolic encephalopathy - mostly improved  - completed rocephin total of 3 days  - culture shows no growth  - all other electrolytes and TSH, B12 normal  - continue AED's for now    #chest pain  - troponin is slightly elevated  - hx of a-fib but no other cardiac disease  - will follow up EKG  - repeat troponin    #Seizure disorder, no reported seizure, will continue with keppra and vimpat, seizure precautions, prn ativan for seizure activity  - neurology consult appreciated  - keppra increased to 1500mg BID    #PAF, will continue with eliquis. ct head with no acute findings. beta blocker on board    #Essential htn, continue toprol xl    #Hypothyroidism, continue synthroid    DISPO: complete antibiotics tomorrow for UTI. PT to see; possibly discharge home on Monday
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
## 2020-08-30 NOTE — CONSULT NOTE ADULT - ASSESSMENT
69 y/o female BIBA for AMS as per history pt. was found naked  and confused by neighbor and ambulance was called. At the time of admission pt. stated that she is ok, po intake has been poor. no abd. pain. no n/v/d per history. no fever. pt. not giving good history and history obtained from ER chart and ER physician. pt. reports no fall. It appears pt. is more alert in the ER likely after getting hydration. Cardiology consulted for chest pain, patient was scheduled to have outpatient visit with our office for echo and stress - apparently chest pain is not new and has been happening, hx is consistent w/ unstable angina. patient has been a smoker x 40 years and quit 8 years ago. Patient overnight described chest pain as 6/10, midsternal, non radiating, associated w/ PAT on telemetry x 14 minutes which spontaneously resolved. EKG showed Q waves in V1 and V2 which is more pronounced than previous EKGs. Patient should undergo ischemic evaluation at some point but will start with Echo and serial troponin/EKG.       Acute Coronary Syndrome CCS4 Unstable Angina   - Hx of Smoking x 40 years and family hx of MI   - Troponin first set mildly elevated  - trend trop/ck/ckmb x3 sets  - Echocardiogram  - continue metoprolol, lipitor, ASA, eliquis  - likely ischemic eval - inpatient vs outpatient dependent upon echo and trops     AFib   - hx of PAF  - continue eliquis   - continue metoprolol     PAT  - PAT overnight   - continue beta blocker  -otherwise rate controlled NSR 69 y/o female BIBA for AMS as per history pt. was found naked  and confused by neighbor and ambulance was called. At the time of admission pt. stated that she is ok, po intake has been poor. no abd. pain. no n/v/d per history. no fever. pt. not giving good history and history obtained from ER chart and ER physician. pt. reports no fall. It appears pt. is more alert in the ER likely after getting hydration. Cardiology consulted for chest pain, patient was scheduled to have outpatient visit with our office for echo and stress - apparently chest pain is not new and has been happening, hx is consistent w/ unstable angina. patient has been a smoker x 40 years and quit 8 years ago. Patient overnight described chest pain as 6/10, midsternal, non radiating, associated w/ PAT on telemetry x 14 minutes which spontaneously resolved. EKG showed Q waves in V1 and V2 which is more pronounced than previous EKGs. Patient should undergo ischemic evaluation at some point but will start with Echo and serial troponin/EKG.       Acute Coronary Syndrome CCS4 Unstable Angina   - Hx of Smoking x 40 years and family hx of MI   - Troponin first set mildly elevated  - trend trop/ck/ckmb x3 sets  - Echocardiogram  - continue metoprolol, lipitor, ASA, eliquis  - likely ischemic eval - inpatient vs outpatient dependent upon echo and trops   - will get records from Dr. Arreguin office     AFib   - hx of PAF  - continue eliquis   - continue metoprolol     PAT  - PAT overnight   - continue beta blocker  -otherwise rate controlled NSR     8/30/2020 16:14 Addendum:   Patient states that she has had cath before 6 months to a year ago and that cath was negative. she also states that she takes multiple cardiac meds. Will reach out to Dr. Arreguin office/[facility] to get medical records.

## 2020-08-30 NOTE — PROGRESS NOTE ADULT - SUBJECTIVE AND OBJECTIVE BOX
CC: AMS (27 Aug 2020 21:12)    INTERVAL HPI/OVERNIGHT EVENTS:  patient complains of back pain and neck which is chronic  overnight with chest pain and trop and EKG obtained    Vital Signs Last 24 Hrs  T(C): 36.8 (30 Aug 2020 08:25), Max: 36.9 (30 Aug 2020 04:42)  T(F): 98.3 (30 Aug 2020 08:25), Max: 98.4 (30 Aug 2020 04:42)  HR: 74 (30 Aug 2020 08:25) (71 - 90)  BP: 119/72 (30 Aug 2020 08:25) (84/58 - 119/72)  BP(mean): --  RR: 18 (30 Aug 2020 08:25) (18 - 18)  SpO2: 95% (30 Aug 2020 08:25) (95% - 99%)    PHYSICAL EXAM:  General: in no acute distress  Eyes: PERRLA, EOMI; conjunctiva and sclera clear  Head: Normocephalic; atraumatic  ENMT: No nasal discharge; airway clear; edentulous  Respiratory: No wheezes, rales or rhonchi  Cardiovascular: Regular rate and rhythm. S1 and S2 Normal; No murmurs, gallops or rubs  Gastrointestinal: Soft non-tender non-distended; Normal bowel sounds  Genitourinary: No costovertebral angle tenderness  Extremities: Normal range of motion, No clubbing, cyanosis or edema  Vascular: Peripheral pulses palpable 2+ bilaterally  Neurological: Alert and oriented x4; at times needs redirection however  Musculoskeletal: Normal gait, tone, without deformities  Psychiatric: Cooperative and appropriate      08-30    142  |  104  |  14.0  ----------------------------<  91  4.2   |  28.0  |  0.72    Ca    8.9      30 Aug 2020 06:09  Phos  2.3     08-30  Mg     2.0     08-30    Troponin T, Serum (08.30.20 @ 06:09)    Troponin T, Serum: 0.07: Reference Interval for Troponin T  Less than 0.06 ng/mL - includes the 99th percentile of a healthy  population at a method C.V. of 10% or less.  NOTE: Troponin T is measured by the Roche CLIA method and these  results are not interchangable with Troponin I results since they are  different assays with different reference intervals. ng/mL    MEDICATIONS  (STANDING):  apixaban 5 milliGRAM(s) Oral two times a day  aspirin enteric coated 81 milliGRAM(s) Oral daily  atorvastatin 10 milliGRAM(s) Oral at bedtime  cefTRIAXone   IVPB 1000 milliGRAM(s) IV Intermittent every 24 hours  escitalopram 10 milliGRAM(s) Oral daily  lacosamide 100 milliGRAM(s) Oral two times a day  levETIRAcetam  IVPB 1000 milliGRAM(s) IV Intermittent every 12 hours  levothyroxine 112 MICROGram(s) Oral daily  metoprolol succinate ER 25 milliGRAM(s) Oral daily  pregabalin 50 milliGRAM(s) Oral two times a day  sodium chloride 0.9%. 1000 milliLiter(s) (100 mL/Hr) IV Continuous <Continuous>  tamsulosin 0.4 milliGRAM(s) Oral at bedtime    MEDICATIONS  (PRN):  haloperidol    Injectable 0.5 milliGRAM(s) IntraMuscular every 6 hours PRN Agitation  LORazepam   Injectable 1 milliGRAM(s) IV Push once PRN for seizure activity.    RADIOLOGY & ADDITIONAL TESTS:

## 2020-08-30 NOTE — PROGRESS NOTE ADULT - ASSESSMENT
The patient is a 70y Female who is followed by neurology because of ams    AMS  elevated WBC on admission  possible seizure vs infection, UA did not suggest UTI  continue keppra to 1500 mg bid, vimpat 100 mg bid  await EEG    Chest pain  Ischemic workup per cardiology    will follow with you    Masoud Dominique MD PhD   [id number]

## 2020-08-30 NOTE — CHART NOTE - NSCHARTNOTEFT_GEN_A_CORE
Called by nursing staff for pt waking up and complaining of episode of CP, 6/10, across midsternum. Pain was associated with a single PVC and a small run of tachycardia. Tachycardia resolved within 14 mins, not seen previously in monitor.  Pt noted pain resolved by time of evaluation.  Regular rhythm. Non tender chest wall.  EKG showed mild QTc prolongation. Will order electrolytes and Troponins for evaluation. Will monitor

## 2020-08-30 NOTE — PROGRESS NOTE ADULT - SUBJECTIVE AND OBJECTIVE BOX
[facility]                                     Neurology at [place]                                 [facility]                                  [address]                                        [place], [place], [address]                                             [phone]    CC: AMS  HPI:  The patient is a 70y Female who presented with AMS.  Per patient she went outside, was not aware that she was naked.  She was seen by a neighbor and was taken to hospital.  She is mostly amnestic of event.  She was seen last month with new diagnosis of seizure.  She denies having had seizure yesterday.  Neurology is asked to evaluate    Interval history: more alert today, no seizure activity. c/o chest pain, cardiology following    ROS neurology: Denies headache or dizziness. Denies weakness/numbness.  Denies speech/language deficits. Denies diplopia/blurred vision.  Denies confusion      MEDICATIONS  (STANDING):  apixaban 5 milliGRAM(s) Oral two times a day  aspirin enteric coated 81 milliGRAM(s) Oral daily  atorvastatin 10 milliGRAM(s) Oral at bedtime  escitalopram 10 milliGRAM(s) Oral daily  lacosamide 100 milliGRAM(s) Oral two times a day  levETIRAcetam 1500 milliGRAM(s) Oral two times a day  levothyroxine 112 MICROGram(s) Oral daily  metoprolol tartrate 25 milliGRAM(s) Oral two times a day  pregabalin 50 milliGRAM(s) Oral two times a day  tamsulosin 0.4 milliGRAM(s) Oral at bedtime    MEDICATIONS  (PRN):  acetaminophen    Suspension .. 650 milliGRAM(s) Oral every 6 hours PRN Mild Pain (1 - 3), Moderate Pain (4 - 6)  haloperidol    Injectable 0.5 milliGRAM(s) IntraMuscular every 6 hours PRN Agitation  LORazepam   Injectable 1 milliGRAM(s) IV Push once PRN for seizure activity.  oxycodone    5 mG/acetaminophen 325 mG 1 Tablet(s) Oral every 12 hours PRN Moderate Pain (4 - 6)    Vital Signs Last 24 Hrs  T(C): 36.8 (30 Aug 2020 08:25), Max: 36.9 (30 Aug 2020 04:42)  T(F): 98.3 (30 Aug 2020 08:25), Max: 98.4 (30 Aug 2020 04:42)  HR: 74 (30 Aug 2020 08:25) (71 - 81)  BP: 119/72 (30 Aug 2020 08:25) (97/62 - 119/72)  BP(mean): --  RR: 18 (30 Aug 2020 08:25) (18 - 18)  SpO2: 95% (30 Aug 2020 08:25) (95% - 99%)    Detailed Neurologic Exam:    Mental status: The patient is awake and alert and has normal attention span.  The patient is fully oriented in 3 spheres. The patient is oriented to current events. The patient is able to name objects, follow commands, repeat sentences. no dysarthria    Cranial nerves: Pupils equal and react symmetrically to light. There is no visual field deficit to confrontation. Extraocular motion is full with no nystagmus. There is no ptosis. Facial sensation is intact. Facial musculature is symmetric. Palate elevates symmetrically. Shoulder shrug is normal. Tongue is midline.    Motor: There is normal bulk and tone.  There is no tremor.  No focal weakness in 4 extremities    Sensation: Intact to light touch and pin in 4 extremities    Reflexes: 2+ throughout and plantar responses are flexor.    Cerebellar: There is no dysmetria on finger to nose testing.    Gait : deferred    LABS:              142  |  104  |  14.0  ----------------------------<  91  4.2   |  28.0  |  0.72    Ca    8.9      30 Aug 2020 06:09  Phos  2.3     08-30  Mg     2.0     08-30      Urinalysis Basic - ( 27 Aug 2020 19:24 )    Color: Yellow / Appearance: Clear / S.020 / pH: x  Gluc: x / Ketone: Large  / Bili: Negative / Urobili: Negative mg/dL   Blood: x / Protein: 100 mg/dL / Nitrite: Negative   Leuk Esterase: Negative / RBC: 0-2 /HPF / WBC 0-2   Sq Epi: x / Non Sq Epi: Occasional / Bacteria: Occasional        RADIOLOGY & ADDITIONAL STUDIES (independently reviewed unless otherwise noted):  Head CT no acute stroke, mass or bleed

## 2020-08-30 NOTE — CONSULT NOTE ADULT - ATTENDING COMMENTS
I have personally seen, examined, and participated in the care of this patient. I have reviewed all pertinent clinical information, including history, physical exam, plan, and the PA/NP's note and agree except as noted below.    69 year old woman with HTN, HL, SVT s/p ablation, mild-mod AS, COPD, normal coronaries by cardiac cath 3/2018, AF w RVR (diagnosed 5/2019) s/p ILR who presented after being found with altered mental status. She has since improved and is being treated for possible pneumonia. Notably, she used to follow with [facility] but has requested to change over to [facility]. She complained of having chest pain and was noted to have low positive troponins. She states she has had chronic chest discomfort which is intermittent, at times radiating to jaw and left arm. ECG is without acute ischemic changes.    Plan:  - Nuclear stress test  - TTE  - Continue Eliquis for pAF  - Interrogate ILR to assess for possible fall (tachy/kalani)  - Continue BB    Thank you for this consult. We will follow with you.    Bobo Barrientos MD  Clinical Cardiac Electrophysiology

## 2020-08-31 NOTE — PROGRESS NOTE ADULT - SUBJECTIVE AND OBJECTIVE BOX
[facility]                                        Neurology at [place]                                 [facility]                                  [address]                                        [place], [place], [address]                                             [phone]        CC: Encephalopathy    HPI:   The patient is a 70y Female who presented with AMS.  Per patient she went outside, was not aware that she was naked.  She was seen by a neighbor and was taken to hospital.  She is mostly amnestic of event.  She was seen last month with new diagnosis of seizure.  She denied having had seizures.    Interim history:  On 3 [place],     ROS:   Denies headache or dizziness.  Denies chest pain.  Denies shortness of breath.    MEDICATIONS  (STANDING):  apixaban 5 milliGRAM(s) Oral two times a day  aspirin enteric coated 81 milliGRAM(s) Oral daily  atorvastatin 10 milliGRAM(s) Oral at bedtime  escitalopram 10 milliGRAM(s) Oral daily  lacosamide 100 milliGRAM(s) Oral two times a day  levETIRAcetam 1500 milliGRAM(s) Oral two times a day  levothyroxine 112 MICROGram(s) Oral daily  metoprolol tartrate 25 milliGRAM(s) Oral two times a day  pregabalin 50 milliGRAM(s) Oral two times a day  tamsulosin 0.4 milliGRAM(s) Oral at bedtime      Vital Signs Last 24 Hrs  T(C): 36.6 (31 Aug 2020 05:03), Max: 36.9 (30 Aug 2020 15:31)  T(F): 97.9 (31 Aug 2020 05:03), Max: 98.4 (30 Aug 2020 15:31)  HR: 76 (31 Aug 2020 08:08) (71 - 76)  BP: 111/72 (31 Aug 2020 08:08) (91/57 - 120/71)  RR: 18 (31 Aug 2020 05:03) (18 - 18)  SpO2: 97% (31 Aug 2020 05:03) (94% - 98%)    Detailed Neurologic Exam:    Mental status: The patient is awake and alert. There is no aphasia. There is no dysarthria.     Cranial nerves: Pupils equal and react symmetrically to light. There is no visual field deficit to threat. Extraocular motion is full with no nystagmus. There is no ptosis. Facial sensation is intact. Facial musculature is symmetric. Palate elevates symmetrically. Tongue is midline.    Motor: There is normal bulk and tone.  There is no tremor.  Strength grossly 5/5 bilaterally.    Sensation: Grossly intact to light touch and pin.    Reflexes: 2+ throughout and plantar responses are flexor.    Cerebellar: No dysmetria on finger nose testing.    Labs:     08-30    142  |  104  |  14.0  ----------------------------<  91  4.2   |  28.0  |  0.72    Ca    8.9      30 Aug 2020 06:09  Phos  2.3     08-30  Mg     2.0     08-30

## 2020-08-31 NOTE — PROGRESS NOTE ADULT - SUBJECTIVE AND OBJECTIVE BOX
CC: AMS (27 Aug 2020 21:12)    INTERVAL HPI/OVERNIGHT EVENTS:  patient without acute events  had another episode of chest pain yesterday but did not tell the nurse  she is now scheduled for stress test in the AM with cardiology    she told her son yesterday that she has been snorting percocet at home with a straw  she is willing for rehab - we looked into [facility] which at this time did not accept for inpatient  [facility] felt she was outpatient appropriate.    Vital Signs Last 24 Hrs  T(C): 36.4 (31 Aug 2020 15:30), Max: 36.8 (30 Aug 2020 20:40)  T(F): 97.6 (31 Aug 2020 15:30), Max: 98.2 (30 Aug 2020 20:40)  HR: 77 (31 Aug 2020 15:30) (71 - 77)  BP: 100/60 (31 Aug 2020 15:30) (91/57 - 111/72)  BP(mean): --  RR: 18 (31 Aug 2020 15:30) (18 - 18)  SpO2: 100% (31 Aug 2020 15:30) (94% - 100%)    PHYSICAL EXAM:  General: in no acute distress  Eyes: PERRLA, EOMI; conjunctiva and sclera clear  Head: Normocephalic; atraumatic  ENMT: No nasal discharge; airway clear; edentulous  Respiratory: No wheezes, rales or rhonchi  Cardiovascular: Regular rate and rhythm. S1 and S2 Normal; No murmurs, gallops or rubs  Gastrointestinal: Soft non-tender non-distended; Normal bowel sounds  Genitourinary: No costovertebral angle tenderness  Extremities: Normal range of motion, No clubbing, cyanosis or edema  Vascular: Peripheral pulses palpable 2+ bilaterally  Neurological: Alert and oriented x4; at times needs redirection however  Musculoskeletal: Normal gait, tone, without deformities  Psychiatric: Cooperative and appropriate      08-30 08-30    142  |  104  |  14.0  ----------------------------<  91  4.2   |  28.0  |  0.72    Ca    8.9      30 Aug 2020 06:09  Phos  2.3     08-30  Mg     2.0     08-30    Troponin T, Serum (08.30.20 @ 06:09)    Troponin T, Serum: 0.07: Reference Interval for Troponin T  Less than 0.06 ng/mL - includes the 99th percentile of a healthy  population at a method C.V. of 10% or less.  NOTE: Troponin T is measured by the Roche CLIA method and these  results are not interchangable with Troponin I results since they are  different assays with different reference intervals. ng/mL    Troponin T, Serum (08.30.20 @ 11:11)    Troponin T, Serum: 0.05: Reference Interval for Troponin T  Less than 0.06 ng/mL - includes the 99th percentile of a healthy  population at a method C.V. of 10% or less.  NOTE: Troponin T is measured by the Roche CLIA method and these  results are not interchangable with Troponin I results since they are  different assays with different reference intervals. ng/mL    MEDICATIONS  (STANDING):  apixaban 5 milliGRAM(s) Oral two times a day  aspirin enteric coated 81 milliGRAM(s) Oral daily  atorvastatin 10 milliGRAM(s) Oral at bedtime  cefTRIAXone   IVPB 1000 milliGRAM(s) IV Intermittent every 24 hours  escitalopram 10 milliGRAM(s) Oral daily  lacosamide 100 milliGRAM(s) Oral two times a day  levETIRAcetam  IVPB 1000 milliGRAM(s) IV Intermittent every 12 hours  levothyroxine 112 MICROGram(s) Oral daily  metoprolol succinate ER 25 milliGRAM(s) Oral daily  pregabalin 50 milliGRAM(s) Oral two times a day  sodium chloride 0.9%. 1000 milliLiter(s) (100 mL/Hr) IV Continuous <Continuous>  tamsulosin 0.4 milliGRAM(s) Oral at bedtime    MEDICATIONS  (PRN):  haloperidol    Injectable 0.5 milliGRAM(s) IntraMuscular every 6 hours PRN Agitation  LORazepam   Injectable 1 milliGRAM(s) IV Push once PRN for seizure activity.    RADIOLOGY & ADDITIONAL TESTS:

## 2020-08-31 NOTE — DISCHARGE NOTE PROVIDER - NSDCFUSCHEDAPPT_GEN_ALL_CORE_FT
SKYLER OSUNA ; 09/03/2020 ; NPP Neurology [address] [facility]  SKYLER OSUNA ; 09/10/2020 ; NPP Neuro [address] [facility]  SKYLER OSUNA ; 09/21/2020 ; NPP Cardio [address] [facility] SKYLER OSUNA ; 09/10/2020 ; NPP Neuro [address] [facility]  SKYLER OSUNA ; 09/21/2020 ; NPP Cardio 39 [facility] SKYLER OSUNA ; 09/10/2020 ; NPP Neuro [address] [facility]  SKYLER OSUNA ; 09/21/2020 ; NPP Cardio [address] [facility]

## 2020-08-31 NOTE — PROGRESS NOTE ADULT - ASSESSMENT
70y Female who is followed by neurology because of ams.  Now resolved.     AMS  Possible seizure vs infection, UA did not suggest UTI.  Continue Keppra 1500 mg bid, Vimpat 100 mg bid.  No need for EEG    Chest pain  Ischemic workup per cardiology    Discussed with Dr Rea.

## 2020-08-31 NOTE — DISCHARGE NOTE PROVIDER - DISCHARGE DATE
Jayne Garcia from [facility] called regarding the patient  He has developed a rash on the arm which looks like shingles  We will start patient on acyclovir 400 mg 4 times a day.   Was reduced because of renal function  Patient's GFR is around 30 31-Aug-2020

## 2020-08-31 NOTE — DISCHARGE NOTE PROVIDER - PROVIDER TOKENS
FREE:[LAST:[[facility]],PHONE:[(   )    -],FAX:[(   )    -],SCHEDULEDAPPT:[09/21/2020]] PROVIDER:[TOKEN:[[id number]]],PROVIDER:[TOKEN:[[id number]]] PROVIDER:[TOKEN:[[id number]]],PROVIDER:[TOKEN:[[id number]]],FREE:[LAST:[PCP],PHONE:[(   )    -],FAX:[(   )    -],FOLLOWUP:[1-3 days]]

## 2020-08-31 NOTE — DISCHARGE NOTE PROVIDER - NSDCMRMEDTOKEN_GEN_ALL_CORE_FT
apixaban 5 mg oral tablet: 1 tab(s) orally 2 times a day  aspirin 81 mg oral tablet: 1 tab(s) orally once a day  atorvastatin 10 mg oral tablet: 1 tab(s) orally once a day (at bedtime)  lacosamide 100 mg oral tablet: 1 tab(s) orally 2 times a day  levETIRAcetam 1000 mg oral tablet: 1 tab(s) orally 2 times a day  Lexapro 10 mg oral tablet: 1 tab(s) orally once a day  lidocaine 5% topical film: Apply topically to affected area once a day  Lyrica 50 mg oral capsule: 1 cap(s) orally 2 times a day  melatonin 3 mg oral tablet: 1 tab(s) orally once a day (at bedtime)  polyethylene glycol 3350 oral powder for reconstitution: 17 gram(s) orally 2 times a day  sucralfate 1 g oral tablet: 1 tab(s) orally every 12 hours  Synthroid 112 mcg (0.112 mg) oral tablet: 1 tab(s) orally once a day  tamsulosin 0.4 mg oral capsule: 1 cap(s) orally once a day (at bedtime)  Toprol-XL 25 mg oral tablet, extended release: 1 tab(s) orally once a day  Tylenol 325 mg oral capsule: 2 cap(s) orally 3 times a day, As Needed apixaban 5 mg oral tablet: 1 tab(s) orally 2 times a day  aspirin 81 mg oral tablet: 1 tab(s) orally once a day  atorvastatin 10 mg oral tablet: 1 tab(s) orally once a day (at bedtime)  Keppra 500 mg oral tablet: 3 tab(s) orally 2 times a day   Lexapro 10 mg oral tablet: 1 tab(s) orally once a day  lidocaine 5% topical film: Apply topically to affected area once a day   Lyrica 50 mg oral capsule: 1 cap(s) orally 2 times a day MDD:not more than 2 times a day  melatonin 3 mg oral tablet: 1 tab(s) orally once a day (at bedtime)  polyethylene glycol 3350 oral powder for reconstitution: 17 gram(s) orally 2 times a day  sucralfate 1 g oral tablet: 1 tab(s) orally every 12 hours  Synthroid 112 mcg (0.112 mg) oral tablet: 1 tab(s) orally once a day  tamsulosin 0.4 mg oral capsule: 1 cap(s) orally once a day (at bedtime)  Toprol-XL 25 mg oral tablet, extended release: 1 tab(s) orally once a day  Tylenol 325 mg oral capsule: 2 cap(s) orally 3 times a day, As Needed

## 2020-08-31 NOTE — PROGRESS NOTE ADULT - ASSESSMENT
A/P   69 year old woman with HTN, HL, SVT s/p ablation, mild-mod AS, COPD, normal coronaries by cardiac cath 3/2018, AF w RVR (diagnosed 5/2019) s/p ILR who presented after being found with altered mental status; currently at baseline mental status   Patient notes that she has been having chest pain radiating down the L arm TnI 0.07->0.05 with no acute changes noted on the EKG     1. Unstable Angina   - EKG reviewed normal sinus rhythm with no acute changes   - on ASA and Lipitor   - TTE reviewed shows LVEF 50% with enlarged LA with mild MR mod AS (peak velocity 2.4 m/s, mean gradient 13 mmHg, calculated ANGEL by continuity equation 1.0 cm^2) and mod AR   - pending Nuclear stress test in AM (NPO after midnight)   - continue with Lopressor 25mg po q 12hrs and consider uptitrate if blood pressure and HR can tolerate   - obtain records     2. PAF on Eliquis   - sinus rhythm / sinus tachycardia on tele   - continue with Eliquis 5mg po q 12hrs   - continue BB   - interrogate ILR     3. AMS likely secondary to seizures vs infectious etiology ( less likely)   - at baseline mental status   - Neurology follow up appreciated   - on Keppra and Vimpat     Thank You   Deidre Fisher D.O.   Cardiology Attending   # [phone] A/P   69 year old woman with HTN, HL, SVT s/p ablation, mild-mod AS, COPD, normal coronaries by cardiac cath 3/2018, AF w RVR (diagnosed 5/2019) s/p ILR who presented after being found with altered mental status; currently at baseline mental status   Patient notes that she has been having chest pain radiating down the L arm TnI 0.07->0.05 with no acute changes noted on the EKG     1. Unstable Angina   - EKG reviewed normal sinus rhythm with no acute changes   - on ASA and Lipitor   - TTE reviewed shows LVEF 50% with enlarged LA with mild MR mod AS (peak velocity 2.4 m/s, mean gradient 13 mmHg, calculated ANGEL by continuity equation 1.0 cm^2) and mod AR   - pending Nuclear stress test in AM (NPO after midnight)   - continue with Lopressor 25mg po q 12hrs and consider uptitrate if blood pressure and HR can tolerate   - obtain records     2. PAF on Eliquis   - sinus rhythm / sinus tachycardia on tele   - continue with Eliquis 5mg po q 12hrs   - continue BB   - interrogate ILR     3. AMS likely secondary to seizures vs arrhythmogenic  vs infectious etiology ( less likely)   - at baseline mental status   - interrogate ILR if abnormal will need EP eval   - Neurology follow up appreciated   - on Keppra and Vimpat     Thank You   Deidre Fisher D.O.   Cardiology Attending   # [phone] A/P   69 year old woman with HTN, HL, SVT s/p ablation, mild-mod AS, COPD, normal coronaries by cardiac cath 3/2018, AF w RVR (diagnosed 5/2019) s/p ILR who presented after being found with altered mental status; currently at baseline mental status   Patient notes that she has been having chest pain radiating down the L arm TnI 0.07->0.05 with no acute changes noted on the EKG     1. Unstable Angina   - EKG reviewed normal sinus rhythm with no acute changes   - on ASA and Lipitor   - TTE reviewed shows LVEF 50% with enlarged LA with mild MR mod AS (peak velocity 2.4 m/s, mean gradient 13 mmHg, calculated ANGEL by continuity equation 1.0 cm^2) and mod AR   - pending Nuclear stress test in AM (NPO after midnight)   - continue with Lopressor 25mg po q 12hrs and consider uptitrate if blood pressure and HR can tolerate   - obtain records     2. PAF on Eliquis   - sinus rhythm / sinus tachycardia on tele   - continue with Eliquis 5mg po q 12hrs   - continue BB   - interrogate ILR     3. AMS likely secondary to seizures vs arrhythmogenic (tachy -kalani) vs infectious etiology ( less likely)   - at baseline mental status   - interrogate ILR if abnormal will need EP eval   - Neurology follow up appreciated   - on Keppra and Vimpat     4. Numbness of the bilateral lower extremities likely secondary to neuropathy   - obtain Hgb A1c and Folate level   - no concerns for PAD (pulses palpated bilaterally)     Thank You   Deidre Fisher D.O.   Cardiology Attending   # [phone]

## 2020-08-31 NOTE — PROGRESS NOTE ADULT - ASSESSMENT
71 y/o female BIBA for AMS as per history pt. was found naked  and confused by neighbor and ambulance was called. At the time of admission pt. stated that she is ok, po intake has been poor. no abd. pain. no n/v/d per history. no fever. pt. not giving good history and history obtained from ER chart and ER physician. pt. reports no fall. It appears pt. is more alert in the ER likely after getting hydration. At the time of admission pt. knows where she is at, year 2020 and Nikita is the president. Patient states that she can't recall events leading to her admission. Currently she remains AOx3. She currently is being treated for UTI and on IV fluids for elevated lactate from admission that is now improved. Discussed with son who returns on 8/31 and is also inquiring on home aids.    #acute metabolic encephalopathy - resolved at this time  - completed rocephin total of 3 days  - culture shows no growth  - all other electrolytes and TSH, B12 normal  - continue AED's for now    #chest pain - r/o unstable angina  - troponin was slightly elevated yesterday but trended down  - hx of a-fib but no other cardiac disease  - echo without significant change but did show low normal EF  - she is for NST at this time as inpatient  - started on protonix as she is having epigastric tenderness    #Seizure disorder, no reported seizure, will continue with keppra and vimpat, seizure precautions, prn ativan for seizure activity  - neurology consult appreciated  - keppra increased to 1500mg BID    #PAF, will continue with eliquis. ct head with no acute findings. beta blocker on board    #Essential htn, continue toprol xl    #Hypothyroidism, continue synthroid    DISPO: NST tomorrow then look to discharge home with outpatient rehab for pill abuse 69 y/o female BIBA for AMS as per history pt. was found naked  and confused by neighbor and ambulance was called. At the time of admission pt. stated that she is ok, po intake has been poor. no abd. pain. no n/v/d per history. no fever. pt. not giving good history and history obtained from ER chart and ER physician. pt. reports no fall. It appears pt. is more alert in the ER likely after getting hydration. At the time of admission pt. knows where she is at, year 2020 and Nikita is the president. Patient states that she can't recall events leading to her admission. Currently she remains AOx3. She currently is being treated for UTI and on IV fluids for elevated lactate from admission that is now improved. Discussed with son who returns on 8/31 and is also inquiring on home aids.    #acute metabolic encephalopathy - resolved at this time  - sepsis ruled out - elevated lactate likely hypovolemia related  - bacteriuria and pyuria treated and she completed rocephin total of 3 days  - culture shows no growth  - all other electrolytes and TSH, B12 normal  - continue AED's for now    #chest pain - r/o unstable angina  - troponin was slightly elevated yesterday but trended down  - hx of a-fib but no other cardiac disease  - echo without significant change but did show low normal EF  - she is for NST at this time as inpatient  - started on protonix as she is having epigastric tenderness    #Seizure disorder, no reported seizure, will continue with keppra and vimpat, seizure precautions, prn ativan for seizure activity  - neurology consult appreciated  - keppra increased to 1500mg BID    #PAF, will continue with eliquis. ct head with no acute findings. beta blocker on board    #Essential htn, continue toprol xl    #Hypothyroidism, continue synthroid    DISPO: NST tomorrow then look to discharge home with outpatient rehab for pill abuse

## 2020-08-31 NOTE — CDI QUERY NOTE - NSCDIOTHERTXTBX_GEN_ALL_CORE_HH
Clinical documentation indicates that this patient has Sepsis.  The Physician's or Provider's documentation of sepsis is clinically supported by the patient's presentation, evaluation and medical management, as indicated below.  There is a need to further clarify the status of sepsis.  Please indicate status of the patient's sepsis diagnosis in your progress notes and discharge summary as : Sepsis treatment continues, or Sepsis resolving, or Sepsis resolved, or Sepsis ruled-out.     SUPPORTING DOCUMENTATION AND/OR CLINICAL EVIDENCE:  admitted with AMS found to have UTI and acute metabolic encephalopathy  ED = sepsis, encephalopathy      Vital Signs on Admission:  97.7 - 98.3  103 - 126  90/58 - 144/92  18 - 20  92 - 99%      WBC: WBC Count: 12.36 K/uL <H> (08-28-20)  WBC Count: 16.88 K/uL <H> (08-27-20)                              Bands:   Neutrophils   # 15.10    % 89.5           Lactate:  2.3             Blood Cultures:  Culture - Blood  Source: .Blood Blood-Peripheral  Preliminary Report (08-29-20):    No growth at 48 hours    Culture - Blood  Source: .Blood Blood-Peripheral  Preliminary Report (08-29-20):    No growth at 48 hours        Urine Culture:  Culture - Urine (collected 08-28-20)  Source: .Urine Catheterized  Final Report (08-28-20):    No growth      Antibiotics:  cefTRIAXone   IVPB   100 mL/Hr IV Intermittent (08-28-20)   100 mL/Hr IV Intermittent (08-29-20)    cefTRIAXone   IVPB   100 mL/Hr IV Intermittent (08-27-20)        Please clarify, in progress notes and dc summary, the status of sepsis.   - sepsis present on admission, resolving/resolved   - sepsis ruled out   - other   - not clinically significant    Thank you

## 2020-08-31 NOTE — DISCHARGE NOTE PROVIDER - HOSPITAL COURSE
71 y/o female BIBA for AMS as per history pt. was found naked  and confused by neighbor and ambulance was called. At the time of admission pt. stated that she is ok, po intake has been poor. no abd. pain. no n/v/d per history. no fever. pt. not giving good history and history obtained from ER chart and ER physician. pt. reports no fall. It appears pt. is more alert in the ER likely after getting hydration. At the time of admission pt. knows where she is at, year 2020 and Nikita is the president. Patient states that she can't recall events leading to her admission. Currently she remains AOx3. She currently is being treated for UTI and on IV fluids for elevated lactate from admission that is now improved. Discussed with son who returns on Monday and is also inquiring on home aids.        Patient completed treatment for UTI while in house. Patient developed chest pain on 8/30 and was found to have slightly elevated troponin (however following troponin normal). EKG showed no significant changes. She is due for cardiology follow up with [facility] soon. We ordered echo on this admission. She will follow up for NST. Seen in consultation with cardiology        Vital Signs Last 24 Hrs    T(C): 36.6 (31 Aug 2020 05:03), Max: 36.9 (30 Aug 2020 15:31)    T(F): 97.9 (31 Aug 2020 05:03), Max: 98.4 (30 Aug 2020 15:31)    HR: 71 (31 Aug 2020 05:03) (71 - 76)    BP: 94/60 (31 Aug 2020 05:03) (91/57 - 120/71)    BP(mean): --    RR: 18 (31 Aug 2020 05:03) (18 - 18)    SpO2: 97% (31 Aug 2020 05:03) (94% - 98%)        PHYSICAL EXAM:    General: in no acute distress    Eyes: PERRLA, EOMI; conjunctiva and sclera clear    Head: Normocephalic; atraumatic    ENMT: No nasal discharge; airway clear; edentulous    Respiratory: No wheezes, rales or rhonchi    Cardiovascular: Regular rate and rhythm. S1 and S2 Normal; No murmurs, gallops or rubs    Gastrointestinal: Soft non-tender non-distended; Normal bowel sounds    Genitourinary: No costovertebral angle tenderness    Extremities: Normal range of motion, No clubbing, cyanosis or edema    Vascular: Peripheral pulses palpable 2+ bilaterally    Neurological: Alert and oriented x4; at times needs redirection however    Musculoskeletal: Normal gait, tone, without deformities    Psychiatric: Cooperative and appropriate        discharge time 34 minutes 70 yr old female with atrial fibrillation, hyperlipidemia, hypertension, lung nodule, osteoarthritis, aortic stenosis, seizure disorder, s/p spinal cord stimulator was BIBEMS as she was found to be confused at home. In ED, concerns for dehydration and UTI. Ceftriaxone was initiated. CT head was unremarkable for acute changes. Neurology was consulted given seizure history. Keppra dose was increased. She developed chest pain, was scheduled for stress testing as an outpatient. Cardiology was consulted, advised inpatient ECHO and stress testing. She completed her antibiotic course. PT was consulted, advised home PT. Stress test was done, revealed apical ischemia, cardiac cath showed non obstructive CAD. Loop recorder interrogation revealed 9 s pause on Aug 7, EP was consulted, advised possible PPM placement. Epilepsy consult was requested, advised to discontinue Vimpat given side effects like heart block. Decision made to proceed with pacemaker placement. She underwent Med[employer] Micra Leadless Pacemaker Implant on 9/4, EP advised to resume AC. 70 yr old female with atrial fibrillation, hyperlipidemia, hypertension, lung nodule, osteoarthritis, aortic stenosis, seizure disorder, s/p spinal cord stimulator was BIBEMS as she was found to be confused at home. In ED, concerns for dehydration and UTI. Ceftriaxone was initiated. CT head was unremarkable for acute changes. Neurology was consulted given seizure history. Keppra dose was increased. She developed chest pain, was scheduled for stress testing as an outpatient. Cardiology was consulted, advised inpatient ECHO and stress testing. She completed her antibiotic course. PT was consulted, advised home PT. Stress test was done, revealed apical ischemia, cardiac cath showed non obstructive CAD. Loop recorder interrogation revealed 9 s pause on Aug 7, EP was consulted, advised possible PPM placement. Epilepsy consult was requested, advised to discontinue Vimpat given side effects like heart block. Decision made to proceed with pacemaker placement. She underwent Medtronic Micra Leadless Pacemaker Implant on 9/4, EP advised to resume AC.         patient is stable for discharge , will go for drug rehab outpatient , also will follow up with 	EP specialist .                 Physical Exam :    Vitals : reviewed , stable     GEN : age appropriate , resting , NAD     HEART: S1,S2 RRR     LUNGS: CTAB     ABDOMEN: Soft , NT , ND , positive bowel sounds     EXT: no pitting edema                 35 minutes were spent on discharge co ordination .

## 2020-08-31 NOTE — DISCHARGE NOTE PROVIDER - CARE PROVIDERS DIRECT ADDRESSES
,DirectAddress_Unknown ,DirectAddress_Unknown,[email or web address] ,DirectAddress_Unknown,[email or web address],DirectAddress_Unknown

## 2020-09-01 NOTE — PROGRESS NOTE ADULT - ASSESSMENT
70 yr old female with atrial fibrillation, hyperlipidemia, hypertension, lung nodule, osteoarthritis, aortic stenosis, seizure disorder, s/p spinal cord stimulator was BIBEMS as she was found to be confused at home. In ED, concerns for dehydration and UTI. Ceftriaxone was initiated. CT head was unremarkable for acute changes. Neurology was consulted given seizure history. Keppra dose was increased. She developed chest pain, was scheduled for stress testing as an outpatient. Cardiology was consulted, advised inpatient ECHO and stress testing. She completed her antibiotic course. PT was consulted, advised home PT.

## 2020-09-01 NOTE — PROGRESS NOTE ADULT - ASSESSMENT
70y Female who is followed by neurology because of ams.  Now resolved.     AMS  Possible seizure vs infection, UA did not suggest UTI.  Continue Keppra 1500 mg bid, Vimpat 100 mg bid.    Chest pain  Ischemic workup per cardiology.

## 2020-09-01 NOTE — PROGRESS NOTE ADULT - SUBJECTIVE AND OBJECTIVE BOX
[facility]                                        [facility] [place]                                 [facility]                                  [address]                                        [place], [place], [address]                                             [phone]        CC: Encephalopathy    HPI:   The patient is a 70y Female who presented with AMS.  Per patient she went outside, was not aware that she was naked.  She was seen by a neighbor and was taken to hospital.  She is mostly amnestic of event.  She was seen last month with new diagnosis of seizure.  She denied having had seizures.    Interim history:  Remains on 3 [place].  Now status post nuclear stress test.     ROS:   Denies headache or dizziness.  Denies chest pain.  Denies shortness of breath.    MEDICATIONS  (STANDING):  apixaban 5 milliGRAM(s) Oral two times a day  aspirin enteric coated 81 milliGRAM(s) Oral daily  atorvastatin 10 milliGRAM(s) Oral at bedtime  escitalopram 10 milliGRAM(s) Oral daily  lacosamide 100 milliGRAM(s) Oral two times a day  levETIRAcetam 1500 milliGRAM(s) Oral two times a day  levothyroxine 112 MICROGram(s) Oral daily  metoprolol tartrate 25 milliGRAM(s) Oral two times a day  pantoprazole    Tablet 40 milliGRAM(s) Oral before breakfast  pregabalin 50 milliGRAM(s) Oral two times a day  tamsulosin 0.4 milliGRAM(s) Oral at bedtime      Vital Signs Last 24 Hrs  T(C): 36.6 (01 Sep 2020 05:34), Max: 36.6 (31 Aug 2020 21:23)  T(F): 97.8 (01 Sep 2020 05:34), Max: 97.8 (31 Aug 2020 21:23)  HR: 84 (01 Sep 2020 05:34) (77 - 85)  BP: 108/68 (01 Sep 2020 05:34) (100/60 - 108/68)  RR: 18 (01 Sep 2020 05:34) (18 - 18)  SpO2: 99% (01 Sep 2020 05:34) (97% - 100%)    Detailed Neurologic Exam:    Mental status: The patient is awake and alert. There is no aphasia. There is no dysarthria.     Cranial nerves: Pupils equal and react symmetrically to light. There is no visual field deficit to threat. Extraocular motion is full with no nystagmus. There is no ptosis. Facial sensation is intact. Facial musculature is symmetric. Palate elevates symmetrically. Tongue is midline.    Motor: There is normal bulk and tone.  There is no tremor.  Strength grossly 5/5 bilaterally.    Sensation: Grossly intact to light touch and pin.    Reflexes: 2+ throughout and plantar responses are flexor.    Cerebellar: No dysmetria on finger nose testing.
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
## 2020-09-01 NOTE — PROGRESS NOTE ADULT - ASSESSMENT
69 y/o female BIBA for AMS as per history pt. was found naked  and confused by neighbor and ambulance was called. At the time of admission pt. stated that she is ok, po intake has been poor. no abd. pain. no n/v/d per history. no fever. pt. not giving good history and history obtained from ER chart and ER physician. pt. reports no fall. It appears pt. is more alert in the ER likely after getting hydration. Cardiology consulted for chest pain, patient was scheduled to have outpatient visit with our office for echo and stress - apparently chest pain is not new and has been happening, hx is consistent w/ unstable angina. patient has been a smoker x 40 years and quit 8 years ago. Patient overnight described chest pain as 6/10, midsternal, non radiating, associated w/ PAT on telemetry x 14 minutes which spontaneously resolved. EKG showed Q waves in V1 and V2 which is more pronounced than previous EKGs. Patient should undergo ischemic evaluation at some point but will start with Echo and serial troponin/EKG.   69 year old woman with HTN, HL, SVT s/p ablation, mild-mod AS, COPD, normal coronaries by cardiac cath 3/2018, AF w RVR (diagnosed 5/2019) s/p ILR who presented after being found with altered mental status; currently at baseline mental status   Patient notes that she has been having chest pain radiating down the L arm TnI 0.07->0.05 with no acute changes noted on the EKG     1. Unstable Angina   - EKG reviewed normal sinus rhythm with no acute changes   - on ASA and Lipitor   - TTE reviewed shows LVEF 50% with enlarged LA with mild MR mod AS (peak velocity 2.4 m/s, mean gradient 13 mmHg, calculated ANGEL by continuity equation 1.0 cm^2) and mod AR   - nuclear stress this AM - plan of care dependent upon stress results  - continue with Lopressor 25mg po q 12hrs and consider uptitrate if blood pressure and HR can tolerate     2. PAF on Eliquis   - sinus rhythm / sinus tachycardia on tele   - continue with Eliquis 5mg po q 12hrs   - continue BB     3. Heart Block  - ILR was abnormal and showed episodes of heart block  - no episodes on telemetry   - continue telemetry   - nuclear stress test pending   - will discuss need for EP interventions    3. AMS likely secondary to seizures vs arrhythmogenic (tachy -kalani) vs infectious etiology ( less likely)   - at baseline mental status   - Neurology follow up appreciated   - on Keppra and Vimpat     4. Numbness of the bilateral lower extremities likely secondary to neuropathy   - obtain Hgb A1c and Folate level   - no concerns for PAD (pulses palpated bilaterally)

## 2020-09-01 NOTE — PROGRESS NOTE ADULT - SUBJECTIVE AND OBJECTIVE BOX
INTERVAL HPI/OVERNIGHT EVENTS:    CC:    Vital Signs Last 24 Hrs  T(C): 36.6 (01 Sep 2020 05:34), Max: 36.6 (31 Aug 2020 21:23)  T(F): 97.8 (01 Sep 2020 05:34), Max: 97.8 (31 Aug 2020 21:23)  HR: 84 (01 Sep 2020 05:34) (77 - 85)  BP: 108/68 (01 Sep 2020 05:34) (100/60 - 108/68)  BP(mean): --  RR: 18 (01 Sep 2020 05:34) (18 - 18)  SpO2: 99% (01 Sep 2020 05:34) (97% - 100%)    PHYSICAL EXAM:    GENERAL:   CHEST/LUNG:   HEART:   ABDOMEN:   EXTREMITIES:      MEDICATIONS  (STANDING):  apixaban 5 milliGRAM(s) Oral two times a day  aspirin enteric coated 81 milliGRAM(s) Oral daily  atorvastatin 10 milliGRAM(s) Oral at bedtime  escitalopram 10 milliGRAM(s) Oral daily  lacosamide 100 milliGRAM(s) Oral two times a day  levETIRAcetam 1500 milliGRAM(s) Oral two times a day  levothyroxine 112 MICROGram(s) Oral daily  metoprolol tartrate 25 milliGRAM(s) Oral two times a day  pantoprazole    Tablet 40 milliGRAM(s) Oral before breakfast  pregabalin 50 milliGRAM(s) Oral two times a day  tamsulosin 0.4 milliGRAM(s) Oral at bedtime    MEDICATIONS  (PRN):  acetaminophen    Suspension .. 650 milliGRAM(s) Oral every 6 hours PRN Mild Pain (1 - 3), Moderate Pain (4 - 6)  haloperidol    Injectable 0.5 milliGRAM(s) IntraMuscular every 6 hours PRN Agitation  LORazepam   Injectable 1 milliGRAM(s) IV Push once PRN for seizure activity.  oxycodone    5 mG/acetaminophen 325 mG 1 Tablet(s) Oral every 12 hours PRN Moderate Pain (4 - 6)      Allergies    No Known Allergies    Intolerances    chocolate ensure please. (Unknown)        LABS:                  RADIOLOGY & ADDITIONAL TESTS: INTERVAL HPI/OVERNIGHT EVENTS:    CC: chest pain, UTI, seizure disorder, atrial fibrillation, hyperlipidemia, hypertension      Chart and course reviewed. Had stress test today. States has chronic neck pain and is worse post stress test. Denies chest pain. States she has trouble eating regular food and difficulty swallowing.     Vital Signs Last 24 Hrs  T(C): 36.6 (01 Sep 2020 05:34), Max: 36.6 (31 Aug 2020 21:23)  T(F): 97.8 (01 Sep 2020 05:34), Max: 97.8 (31 Aug 2020 21:23)  HR: 84 (01 Sep 2020 05:34) (77 - 85)  BP: 108/68 (01 Sep 2020 05:34) (100/60 - 108/68)  BP(mean): --  RR: 18 (01 Sep 2020 05:34) (18 - 18)  SpO2: 99% (01 Sep 2020 05:34) (97% - 100%)    PHYSICAL EXAM:    GENERAL: alert, not in distress  CHEST/LUNG: b/l air entry  CVS: regular  ABDOMEN: soft, bs+, non tender  EXTREMITIES:  no edema, tenderness    MEDICATIONS  (STANDING):  apixaban 5 milliGRAM(s) Oral two times a day  aspirin enteric coated 81 milliGRAM(s) Oral daily  atorvastatin 10 milliGRAM(s) Oral at bedtime  escitalopram 10 milliGRAM(s) Oral daily  lacosamide 100 milliGRAM(s) Oral two times a day  levETIRAcetam 1500 milliGRAM(s) Oral two times a day  levothyroxine 112 MICROGram(s) Oral daily  metoprolol tartrate 25 milliGRAM(s) Oral two times a day  pantoprazole    Tablet 40 milliGRAM(s) Oral before breakfast  pregabalin 50 milliGRAM(s) Oral two times a day  tamsulosin 0.4 milliGRAM(s) Oral at bedtime    MEDICATIONS  (PRN):  acetaminophen    Suspension .. 650 milliGRAM(s) Oral every 6 hours PRN Mild Pain (1 - 3), Moderate Pain (4 - 6)  haloperidol    Injectable 0.5 milliGRAM(s) IntraMuscular every 6 hours PRN Agitation  LORazepam   Injectable 1 milliGRAM(s) IV Push once PRN for seizure activity.  oxycodone    5 mG/acetaminophen 325 mG 1 Tablet(s) Oral every 12 hours PRN Moderate Pain (4 - 6)      Allergies    No Known Allergies    Intolerances    chocolate ensure please. (Unknown)        LABS:                  RADIOLOGY & ADDITIONAL TESTS:

## 2020-09-02 NOTE — PROGRESS NOTE ADULT - ASSESSMENT
71 y/o female BIBA for AMS as per history pt. was found naked  and confused by neighbor and ambulance was called. At the time of admission pt. stated that she is ok, po intake has been poor. no abd. pain. no n/v/d per history. no fever. pt. not giving good history and history obtained from ER chart and ER physician. pt. reports no fall. It appears pt. is more alert in the ER likely after getting hydration. Cardiology consulted for chest pain, patient was scheduled to have outpatient visit with our office for echo and stress - apparently chest pain is not new and has been happening, hx is consistent w/ unstable angina. patient has been a smoker x 40 years and quit 8 years ago. Patient overnight described chest pain as 6/10, midsternal, non radiating, associated w/ PAT on telemetry x 14 minutes which spontaneously resolved. EKG showed Q waves in V1 and V2 which is more pronounced than previous EKGs. Patient should undergo ischemic evaluation at some point but will start with Echo and serial troponin/EKG.   69 year old woman with HTN, HL, SVT s/p ablation, mild-mod AS, COPD, normal coronaries by cardiac cath 3/2018, AF w RVR (diagnosed 5/2019) s/p ILR who presented after being found with altered mental status; currently at baseline mental status   Patient notes that she has been having chest pain radiating down the L arm TnI 0.07->0.05 with no acute changes noted on the EKG     1. Unstable Angina   - EKG reviewed normal sinus rhythm with no acute changes   - on ASA and Lipitor   - TTE reviewed shows LVEF 50% with enlarged LA with mild MR mod AS (peak velocity 2.4 m/s, mean gradient 13 mmHg, calculated ANGEL by continuity equation 1.0 cm^2) and mod AR   - Stress positive with apical ischemia   - NPO @ [place]  - [facility] in AM for unstable angina CCS4 w/ abnormal stress  - Hold Eliquis tonight and tomorrow AM  - continue beta blockers    2. PAF on Eliquis   - sinus rhythm / sinus tachycardia on tele   - eliquis on hold for cath   - continue BB     3. Heart Block  - ILR was abnormal and showed episodes of heart block  - no episodes on telemetry   - continue telemetry   - will discuss need for EP interventions    3. AMS likely secondary to seizures vs arrhythmogenic (tachy -kalani) vs infectious etiology ( less likely)   - at baseline mental status   - Neurology follow up appreciated   - on Keppra and Vimpat     4. Numbness of the bilateral lower extremities likely secondary to neuropathy   - obtain Hgb A1c and Folate level   - no concerns for PAD (pulses palpated bilaterally) 71 y/o female BIBA for AMS as per history pt. was found naked  and confused by neighbor and ambulance was called. At the time of admission pt. stated that she is ok, po intake has been poor. no abd. pain. no n/v/d per history. no fever. pt. not giving good history and history obtained from ER chart and ER physician. pt. reports no fall. It appears pt. is more alert in the ER likely after getting hydration. Cardiology consulted for chest pain, patient was scheduled to have outpatient visit with our office for echo and stress - apparently chest pain is not new and has been happening, hx is consistent w/ unstable angina. patient has been a smoker x 40 years and quit 8 years ago. Patient overnight described chest pain as 6/10, midsternal, non radiating, associated w/ PAT on telemetry x 14 minutes which spontaneously resolved. EKG showed Q waves in V1 and V2 which is more pronounced than previous EKGs. Patient should undergo ischemic evaluation at some point but will start with Echo and serial troponin/EKG.   69 year old woman with HTN, HL, SVT s/p ablation, mild-mod AS, COPD, normal coronaries by cardiac cath 3/2018, AF w RVR (diagnosed 5/2019) s/p ILR who presented after being found with altered mental status; currently at baseline mental status   Patient notes that she has been having chest pain radiating down the L arm TnI 0.07->0.05 with no acute changes noted on the EKG     1. Unstable Angina   - EKG reviewed normal sinus rhythm with no acute changes   - on ASA and Lipitor   - TTE reviewed shows LVEF 50% with enlarged LA with mild MR mod AS (peak velocity 2.4 m/s, mean gradient 13 mmHg, calculated ANGEL by continuity equation 1.0 cm^2) and mod AR   - Stress positive with apical ischemia   - NPO @ [place]  - [facility] in AM for unstable angina CCS4 w/ abnormal stress  - Hold Eliquis tonight and tomorrow AM  - continue beta blockers    2. PAF on Eliquis   - sinus rhythm / sinus tachycardia on tele   - eliquis on hold for cath   - continue BB     3. Heart Block  - ILR was abnormal and showed episodes of heart block  - no episodes on telemetry   - continue telemetry   - will discuss need for EP interventions    3. AMS likely secondary to seizures vs arrhythmogenic (tachy -kalani) vs infectious etiology ( less likely)   - at baseline mental status   - Neurology follow up appreciated   - on Keppra and Vimpat     4. Numbness of the bilateral lower extremities likely secondary to neuropathy   - no concerns for PAD (pulses palpated bilaterally)   - B12 and Folate within normal limits

## 2020-09-02 NOTE — SWALLOW BEDSIDE ASSESSMENT ADULT - SLP GENERAL OBSERVATIONS
Pt received A&A seated at edge of bed, grossly 0x3, suspect mildly reduced cognition, c/o facial numbness, difficulty swallowing, note mild facial asymmetry at rest, +garbled speech with excess saliva noted, +left sided drooling, pain reported at 0/10 pre/post eval

## 2020-09-02 NOTE — CONSULT NOTE ADULT - ASSESSMENT
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 Admits to opioid abuse.    Impression:  1. AMS: H/o focal epilepsy characterized by focal aware motor seizures; all of previous seizures occurred with patient retaining her baseline wakefulness and alertness. Even though pt was only taking one antiepileptic medication at home when she was discharged with two, suspect AMS that brought pt to the hospital was due to opioid overdose and not breakthrough seizure.  2. Episode of 9s complete heart block on 8/7: Lacosamide is notoriously associated with heart block. Will rapidly taper pt off of lacosamide.  3. Focal epilepsy  4. Unstable angina    Recommendation:  - rapidly come off of lacosamide 100mg BID: 100mg 9/3 AM -> DC  - cvEEG to monitor for clinically subtle seizures as we rapidly taper lacosamide   - continue levetiracetam 1500mg BID  - son Jay has plan to admit pt to substance rehab center after hospital discharge  - seizure precaution  - pending [facility]      Thank you for allowing Epilepsy to participate in the care of this patient.   ______________________  Francine Velasquez MD   [facility]  Cell: [phone]  Epilepsy Consult #: [phone] ([phone])

## 2020-09-02 NOTE — SWALLOW BEDSIDE ASSESSMENT ADULT - SWALLOW EVAL: RECOMMENDED FEEDING/EATING TECHNIQUES
oral hygiene/small sips/bites/teaspoon administration to improve oral intake/crush medication (when feasible)/position upright (90 degrees)

## 2020-09-02 NOTE — PROGRESS NOTE ADULT - ASSESSMENT
70 yr old female with atrial fibrillation, hyperlipidemia, hypertension, lung nodule, osteoarthritis, aortic stenosis, seizure disorder, s/p spinal cord stimulator was BIBEMS as she was found to be confused at home. In ED, concerns for dehydration and UTI. Ceftriaxone was initiated. CT head was unremarkable for acute changes. Neurology was consulted given seizure history. Keppra dose was increased. She developed chest pain, was scheduled for stress testing as an outpatient. Cardiology was consulted, advised inpatient ECHO and stress testing. She completed her antibiotic course. PT was consulted, advised home PT. Stress test was done, revealed apical ischemia.
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
## 2020-09-02 NOTE — SWALLOW BEDSIDE ASSESSMENT ADULT - ORAL PHASE
Stasis in anterior sulcus/Stasis in lateral sulci/Pt able to clear stasis independently Stasis in lateral sulci Stasis in anterior sulcus

## 2020-09-02 NOTE — SWALLOW BEDSIDE ASSESSMENT ADULT - ORAL PREPARATORY PHASE
reduced labial seal, pt reports numbness, which - impacts bolus fomation/Reduced oral grading Reduced oral grading/significant anterior loss of bolus (~half of bolus lost anteriorly with both cup sip and straw sip) Anterior loss of bolus/Reduced oral grading/improved as compared to thin liquid bolus, with less anterior loss and improved overall oral management

## 2020-09-02 NOTE — PROGRESS NOTE ADULT - SUBJECTIVE AND OBJECTIVE BOX
[facility]                                        Neu[facility] at [place]                                 [facility]                                  [address]                                        [place], [place], [address]                                             [phone]        CC: Encephalopathy    HPI:   The patient is a 70y Female who presented with AMS.  Per patient she went outside, was not aware that she was naked.  She was seen by a neighbor and was taken to hospital.  She is mostly amnestic of event.  She was seen last month with new diagnosis of seizure.  She denied having had seizures.    Interim history: no new neuro issues    ROS neurology: Denies headache or dizziness. Denies weakness/numbness.  Denies speech/language deficits. Denies diplopia/blurred vision.  Denies confusion    MEDICATIONS  (STANDING):  apixaban 5 milliGRAM(s) Oral two times a day  aspirin enteric coated 81 milliGRAM(s) Oral daily  atorvastatin 10 milliGRAM(s) Oral at bedtime  escitalopram 10 milliGRAM(s) Oral daily  lacosamide 100 milliGRAM(s) Oral two times a day  levETIRAcetam 1500 milliGRAM(s) Oral two times a day  levothyroxine 112 MICROGram(s) Oral daily  metoprolol tartrate 25 milliGRAM(s) Oral two times a day  pantoprazole    Tablet 40 milliGRAM(s) Oral before breakfast  pregabalin 50 milliGRAM(s) Oral two times a day  tamsulosin 0.4 milliGRAM(s) Oral at bedtime    MEDICATIONS  (PRN):  acetaminophen    Suspension .. 650 milliGRAM(s) Oral every 6 hours PRN Mild Pain (1 - 3), Moderate Pain (4 - 6)  haloperidol    Injectable 0.5 milliGRAM(s) IntraMuscular every 6 hours PRN Agitation  LORazepam   Injectable 1 milliGRAM(s) IV Push once PRN for seizure activity.  oxycodone    5 mG/acetaminophen 325 mG 1 Tablet(s) Oral every 12 hours PRN Moderate Pain (4 - 6)    Vital Signs Last 24 Hrs  T(C): 36.4 (02 Sep 2020 09:52), Max: 36.5 (01 Sep 2020 21:26)  T(F): 97.6 (02 Sep 2020 09:52), Max: 97.7 (01 Sep 2020 21:26)  HR: 75 (02 Sep 2020 09:52) (75 - 707)  BP: 91/57 (02 Sep 2020 09:52) (91/57 - 117/72)  BP(mean): --  RR: 20 (02 Sep 2020 09:52) (18 - 20)  SpO2: 100% (02 Sep 2020 09:52) (98% - 100%)    Detailed Neurologic Exam:    Mental status: The patient is awake and alert. There is no aphasia. There is no dysarthria.     Cranial nerves: Pupils equal and react symmetrically to light. There is no visual field deficit to threat. Extraocular motion is full with no nystagmus. There is no ptosis. Facial sensation is intact. Facial musculature is symmetric. Palate elevates symmetrically. Tongue is midline.    Motor: There is normal bulk and tone.  There is no tremor.  Strength grossly 5/5 bilaterally.    Sensation: Grossly intact to light touch and pin.    Reflexes: 2+ throughout and plantar responses are flexor.    Cerebellar: No dysmetria on finger nose testing.

## 2020-09-02 NOTE — PROGRESS NOTE ADULT - SUBJECTIVE AND OBJECTIVE BOX
Patient seen today in bed. Patient does not recall events surrounding 8/9/2020 around 906AM    TELE: SR no events    MEDICATIONS  (STANDING):  apixaban 5 milliGRAM(s) Oral two times a day  aspirin enteric coated 81 milliGRAM(s) Oral daily  atorvastatin 10 milliGRAM(s) Oral at bedtime  escitalopram 10 milliGRAM(s) Oral daily  lacosamide 100 milliGRAM(s) Oral two times a day  levETIRAcetam 1500 milliGRAM(s) Oral two times a day  levothyroxine 112 MICROGram(s) Oral daily  metoprolol tartrate 25 milliGRAM(s) Oral two times a day  pantoprazole    Tablet 40 milliGRAM(s) Oral before breakfast  pregabalin 50 milliGRAM(s) Oral two times a day  tamsulosin 0.4 milliGRAM(s) Oral at bedtime    MEDICATIONS  (PRN):  acetaminophen    Suspension .. 650 milliGRAM(s) Oral every 6 hours PRN Mild Pain (1 - 3), Moderate Pain (4 - 6)  haloperidol    Injectable 0.5 milliGRAM(s) IntraMuscular every 6 hours PRN Agitation  LORazepam   Injectable 1 milliGRAM(s) IV Push once PRN for seizure activity.  oxycodone    5 mG/acetaminophen 325 mG 1 Tablet(s) Oral every 12 hours PRN Moderate Pain (4 - 6)    Allergies  No Known Allergies    Intolerances  chocolate ensure please. (Unknown)    PAST MEDICAL & SURGICAL HISTORY:  Seizure  S/P insertion of spinal cord stimulator  Lung nodule  Aortic stenosis  Breast CA  OA (osteoarthritis)  Atrial fibrillation  HLD (hyperlipidemia)  HTN (hypertension)  H/O left mastectomy    Vital Signs Last 24 Hrs  T(C): 36.4 (02 Sep 2020 09:52), Max: 36.5 (01 Sep 2020 21:26)  T(F): 97.6 (02 Sep 2020 09:52), Max: 97.7 (01 Sep 2020 21:26)  HR: 75 (02 Sep 2020 09:52) (75 - 707)  BP: 91/57 (02 Sep 2020 09:52) (91/57 - 117/72)  RR: 20 (02 Sep 2020 09:52) (18 - 20)  SpO2: 100% (02 Sep 2020 09:52) (98% - 100%)    Physical Exam:  Constitutional: NAD, AAOx3  Cardiovascular: +S1S2 RRR, 2/6 DEMETRI at LSB  Pulmonary: CTA b/l, unlabored  GI: soft NTND +BS  Extremities: no pedal edema,   Neuro: non focal, GUSTAFSON x4    TTE 8/31/2020  Summary:   1. Endocardial visualization was enhanced with intravenous echo contrast.   2. Low normal global left ventricular systolic function.   3. Left ventricular ejection fraction, by visual estimation, is 50%.   4. Normal left ventricular internal cavity size.   5. Normal right ventricle size and systolic function.   6. Moderately enlarged left atrium.   7. Normal right atrial size.   8. Moderate thickening and calcification of the anterior and posterior mitral valve leaflets.   9. Severe mitral annular calcification.  10. Mild mitral valve regurgitation.  11. Aortic valve leaflet calcification. Moderate aortic stenosis (peak velocity 2.4 m/s, mean gradient 13 mmHg, calculated ANGEL by continuity equation 1.0 cm^2, dimensionless index 0.34, stroke volume index 35 mL/m^2).  12. Moderate aortic regurgitation.  13. There is no evidence of pericardial effusion.  14. Recommend clinical correlation with the above findings.    CT head 8/27/2020  IMPRESSION:   No acute abnormality.    Nuclear Stress Test 9/1/2020  IMPRESSIONS:Abnormal Study  * Chest Pain: No chest pain with administration of  Regadenoson.  * Symptom: Shortness of breath.  * HR Response: Appropriate.  * BP Response: Appropriate.  * Heart Rhythm: Normal Sinus Rhythm - 71 BPM.  * Baseline ECG: Normal axis, septal infarct, no  significant ST/T wave abnormality.  * ECG Abnormalities: There were no diagnostic changes.  * Arrhythmia: None.  * Mild apical ischemia.  * Post-stress resting myocardial perfusion gated SPECT  imaging was performed (LVEF = 58 %;LVEDV = 85 ml.)  Conclusion:  Mild apical ischemia.    EKG 8/30/2020: SR at 81bpm; QRSD 68ms    A/P  70 year old female patient  with a history of HTN, HLD, mild-mod AS, COPD, spinal cord stimulator, normal coronaries by cardiac cath 3/2018, paroxysmal atrial fibrillation s/p ablation (CTI + PVI Dr. Arreguin 11/2019), hx of seizures with ILR recording of high grade AV block with 9 seconds pause on 8/7/2020 at 906AM    Patient does not recall events of that day. Poor historian. Doubt ischemia seen on stress test would be responsible for conduction block, Narrow baseline QRS with no further telemetry events.     - LHC postponed to 9/3/2020 due to patient taking Eliquis  - Continue Beta blocker   - Likely will require pacemaker prior to discharge.   - Full recommendations to follow [place] [facility]                                                       [facility] [place]                                                      Office: [address] [address]                                                       Telephone: [phone]. Fax:[phone]    Patient seen today in bed. Patient does not recall events surrounding 8/9/2020 around 906AM    TELE: SR no events    MEDICATIONS  (STANDING):  apixaban 5 milliGRAM(s) Oral two times a day  aspirin enteric coated 81 milliGRAM(s) Oral daily  atorvastatin 10 milliGRAM(s) Oral at bedtime  escitalopram 10 milliGRAM(s) Oral daily  lacosamide 100 milliGRAM(s) Oral two times a day  levETIRAcetam 1500 milliGRAM(s) Oral two times a day  levothyroxine 112 MICROGram(s) Oral daily  metoprolol tartrate 25 milliGRAM(s) Oral two times a day  pantoprazole    Tablet 40 milliGRAM(s) Oral before breakfast  pregabalin 50 milliGRAM(s) Oral two times a day  tamsulosin 0.4 milliGRAM(s) Oral at bedtime    MEDICATIONS  (PRN):  acetaminophen    Suspension .. 650 milliGRAM(s) Oral every 6 hours PRN Mild Pain (1 - 3), Moderate Pain (4 - 6)  haloperidol    Injectable 0.5 milliGRAM(s) IntraMuscular every 6 hours PRN Agitation  LORazepam   Injectable 1 milliGRAM(s) IV Push once PRN for seizure activity.  oxycodone    5 mG/acetaminophen 325 mG 1 Tablet(s) Oral every 12 hours PRN Moderate Pain (4 - 6)    Allergies  No Known Allergies    Intolerances  chocolate ensure please. (Unknown)    PAST MEDICAL & SURGICAL HISTORY:  Seizure  S/P insertion of spinal cord stimulator  Lung nodule  Aortic stenosis  Breast CA  OA (osteoarthritis)  Atrial fibrillation  HLD (hyperlipidemia)  HTN (hypertension)  H/O left mastectomy    Vital Signs Last 24 Hrs  T(C): 36.4 (02 Sep 2020 09:52), Max: 36.5 (01 Sep 2020 21:26)  T(F): 97.6 (02 Sep 2020 09:52), Max: 97.7 (01 Sep 2020 21:26)  HR: 75 (02 Sep 2020 09:52) (75 - 707)  BP: 91/57 (02 Sep 2020 09:52) (91/57 - 117/72)  RR: 20 (02 Sep 2020 09:52) (18 - 20)  SpO2: 100% (02 Sep 2020 09:52) (98% - 100%)    Physical Exam:  Constitutional: NAD, AAOx3  Cardiovascular: +S1S2 RRR, 2/6 DEMETRI at LSB  Pulmonary: CTA b/l, unlabored  GI: soft NTND +BS  Extremities: no pedal edema,   Neuro: non focal, GUSTAFSON x4    TTE 8/31/2020  Summary:   1. Endocardial visualization was enhanced with intravenous echo contrast.   2. Low normal global left ventricular systolic function.   3. Left ventricular ejection fraction, by visual estimation, is 50%.   4. Normal left ventricular internal cavity size.   5. Normal right ventricle size and systolic function.   6. Moderately enlarged left atrium.   7. Normal right atrial size.   8. Moderate thickening and calcification of the anterior and posterior mitral valve leaflets.   9. Severe mitral annular calcification.  10. Mild mitral valve regurgitation.  11. Aortic valve leaflet calcification. Moderate aortic stenosis (peak velocity 2.4 m/s, mean gradient 13 mmHg, calculated ANGEL by continuity equation 1.0 cm^2, dimensionless index 0.34, stroke volume index 35 mL/m^2).  12. Moderate aortic regurgitation.  13. There is no evidence of pericardial effusion.  14. Recommend clinical correlation with the above findings.    CT head 8/27/2020  IMPRESSION:   No acute abnormality.    Nuclear Stress Test 9/1/2020  IMPRESSIONS:Abnormal Study  * Chest Pain: No chest pain with administration of  Regadenoson.  * Symptom: Shortness of breath.  * HR Response: Appropriate.  * BP Response: Appropriate.  * Heart Rhythm: Normal Sinus Rhythm - 71 BPM.  * Baseline ECG: Normal axis, septal infarct, no  significant ST/T wave abnormality.  * ECG Abnormalities: There were no diagnostic changes.  * Arrhythmia: None.  * Mild apical ischemia.  * Post-stress resting myocardial perfusion gated SPECT  imaging was performed (LVEF = 58 %;LVEDV = 85 ml.)  Conclusion:  Mild apical ischemia.    EKG 8/30/2020: SR at 81bpm; QRSD 68ms    A/P  70 year old female patient  with a history of HTN, HLD, mild-mod AS, COPD, spinal cord stimulator, normal coronaries by cardiac cath 3/2018, paroxysmal atrial fibrillation s/p ablation (CTI + PVI Dr. Arreguin 11/2019), hx of seizures with ILR recording of high grade AV block with 9 seconds pause on 8/7/2020 at 906AM    Patient does not recall events of that day. Poor historian. Doubt ischemia seen on stress test would be responsible for conduction block, Narrow baseline QRS with no further telemetry events.     - LHC postponed to 9/3/2020 due to patient taking Eliquis  - Continue Beta blocker   - Likely will require pacemaker prior to discharge.   - Full recommendations to follow

## 2020-09-02 NOTE — SWALLOW BEDSIDE ASSESSMENT ADULT - SWALLOW EVAL: DIAGNOSIS
Moderate-severe oral dysphagia marked by reduced labial seal, anterior loss of liquids, stasis in anterior/lateral sulcus. Pt unable to chew mech soft bolus with +tongue biting with attempts at mastication. Pharyngeal stage appears grossly functional, however negatively impacted by oral stage deficits.

## 2020-09-02 NOTE — PROGRESS NOTE ADULT - SUBJECTIVE AND OBJECTIVE BOX
INTERVAL HPI/OVERNIGHT EVENTS:    CC: chest pain, UTI, seizure disorder, atrial fibrillation, hyperlipidemia, hypertension      No overnight events, denies chest pain. Discussed need for cardiac cath in am.    Vital Signs Last 24 Hrs  T(C): 36.4 (02 Sep 2020 09:52), Max: 36.5 (01 Sep 2020 21:26)  T(F): 97.6 (02 Sep 2020 09:52), Max: 97.7 (01 Sep 2020 21:26)  HR: 75 (02 Sep 2020 09:52) (75 - 707)  BP: 91/57 (02 Sep 2020 09:52) (91/57 - 117/72)  BP(mean): --  RR: 20 (02 Sep 2020 09:52) (18 - 20)  SpO2: 100% (02 Sep 2020 09:52) (98% - 100%)    PHYSICAL EXAM:    GENERAL: alert, not in distress  CHEST/LUNG: b/l air entry  HEART: regular  ABDOMEN: soft, bs+  EXTREMITIES:  no edema, tenderness    MEDICATIONS  (STANDING):  apixaban 5 milliGRAM(s) Oral two times a day  aspirin enteric coated 81 milliGRAM(s) Oral daily  atorvastatin 10 milliGRAM(s) Oral at bedtime  escitalopram 10 milliGRAM(s) Oral daily  lacosamide 100 milliGRAM(s) Oral two times a day  levETIRAcetam 1500 milliGRAM(s) Oral two times a day  levothyroxine 112 MICROGram(s) Oral daily  metoprolol tartrate 25 milliGRAM(s) Oral two times a day  pantoprazole    Tablet 40 milliGRAM(s) Oral before breakfast  pregabalin 50 milliGRAM(s) Oral two times a day  tamsulosin 0.4 milliGRAM(s) Oral at bedtime    MEDICATIONS  (PRN):  acetaminophen    Suspension .. 650 milliGRAM(s) Oral every 6 hours PRN Mild Pain (1 - 3), Moderate Pain (4 - 6)  haloperidol    Injectable 0.5 milliGRAM(s) IntraMuscular every 6 hours PRN Agitation  LORazepam   Injectable 1 milliGRAM(s) IV Push once PRN for seizure activity.  oxycodone    5 mG/acetaminophen 325 mG 1 Tablet(s) Oral every 12 hours PRN Moderate Pain (4 - 6)      Allergies    No Known Allergies    Intolerances    chocolate ensure please. (Unknown)        LABS:                  RADIOLOGY & ADDITIONAL TESTS:

## 2020-09-02 NOTE — CONSULT NOTE ADULT - SUBJECTIVE AND OBJECTIVE BOX
EPILEPSY PRELIMINARY CONSULT NOTE      Impression:  AMS upon admission likely due to narcotic abuse, less likely postictal state.      Recommendation:  - continue home AEDs        Formal consult note to follow.  ______________________  Francine Velasquez MD   [facility]  Cell: [phone]  Epilepsy consult #: [id number] ([phone]) [facility]                                                                     Francine Velasquez MD                                              Epilepsy Consult #: [id number] ([phone])                                               Office: [address] [place] [address], [place], [place], [address]                                                 Phone: [phone]; Fax: [phone]                            ==============================================    EPILEPSY INITIAL CONSULT NOTE      ADMITTING DIAGNOSIS: Sepsis      HPI:  69 y/o LH female with a history of HTN, HLD, hypothyroid, PAF (on apixaban) s/p ILR, SVT s/p ablation, AS, left breast CA s/p mastectomy and chemo 2010, bilateral lung nodules, spinal stimulator for pain ([place], unclear if MRI compatible), new onset focal epilepsy July of 2020 BIBA for AMS. Pt was found naked  and confused by neighbor and ambulance was called. At the time of admission pt. stated that she is ok, po intake has been poor. no abd. pain. no n/v/d per history. no fever. pt. not giving good history and history obtained from ER chart and ER physician. pt. reports no fall. It appears pt. is more alert in the ER likely after getting hydration. At the time of admission pt. knows where she is at, year 2020 and Nikita is the president.     Patient was recently hospitalized for new onset focal epilepsy characterized by focal aware motor seizures. Discharged on levetiracetam 1000mg BID and [id number] BID. However, because of co-pay, pt was only able to afford lacosamide (she was not taking levetiracetam). Levetiracetam was increased to 1500mg BID upon admission. Pt did admit to opioid abuse and dependence due to chronic pain. When asked how much pain meds did she take on day of admission, pt was unable to recall. Important to note that pt is fully awake and alert at her baseline mentation during her focal motor seizures, which is different from how she presented this time.    During hospitalization, reported chest pain. ECG NSR without acuate ischemic changes. Low positive trops. TTE low normal LVEF. Stress test positive for mild apical angina. Pending cardiac cath. Interrogation of ILR revealed an episode of complete heart block that lasted 9s on 8/7; pt asymptomatic.      CURRENT AEDs:  levetiracetam 1500mg BID  lacosamide 100mg BID    IMAGING:   CTH 8/27/20: unremarkable     PMH:  Seizure  S/P insertion of spinal cord stimulator  Lung nodule  Aortic stenosis  Breast CA  OA (osteoarthritis)  Atrial fibrillation  HLD (hyperlipidemia)  HTN (hypertension)  No pertinent past medical history      PSH:  H/O left mastectomy  No significant past surgical history      MEDICATION:  acetaminophen    Suspension .. 650 milliGRAM(s) Oral every 6 hours PRN Mild Pain (1 - 3), Moderate Pain (4 - 6)  apixaban 5 milliGRAM(s) Oral two times a day  aspirin enteric coated 81 milliGRAM(s) Oral daily  atorvastatin 10 milliGRAM(s) Oral at bedtime  escitalopram 10 milliGRAM(s) Oral daily  haloperidol    Injectable 0.5 milliGRAM(s) IntraMuscular every 6 hours PRN Agitation  lacosamide 100 milliGRAM(s) Oral two times a day  levETIRAcetam 1000 milliGRAM(s) Oral two times a day  levothyroxine 112 MICROGram(s) Oral daily  LORazepam   Injectable 1 milliGRAM(s) IV Push once PRN for seizure activity.  metoprolol tartrate 25 milliGRAM(s) Oral two times a day  oxycodone    5 mG/acetaminophen 325 mG 1 Tablet(s) Oral every 12 hours PRN Moderate Pain (4 - 6)  pantoprazole    Tablet 40 milliGRAM(s) Oral before breakfast  pregabalin 50 milliGRAM(s) Oral two times a day  tamsulosin 0.4 milliGRAM(s) Oral at bedtime    ALLERGIES:  chocolate ensure please. (Unknown)    FH:  noncontributory    SH:  Opioid abuse and dependence     ROS:  Neurology as per HPI, otherwise negative for constitutional, skin, eyes, ENT, respiratory, cardiovascular, gastrointestinal, genitourinary, musculoskeletal, psychiatric, hematology/lymphatics, endocrine, allergic/immunologic.    VITALS:  T(C): 36.6 (09-02-20 @ 20:49), Max: 36.6 (09-02-20 @ 20:49)  HR: 74 (09-03-20 @ 00:23) (74 - 707)  BP: 108/69 (09-03-20 @ 00:23) (91/57 - 122/74)  ABP: --  RR: 18 (09-03-20 @ 00:23) (18 - 20)  SpO2: 99% (09-03-20 @ 00:23) (98% - 100%)  CVP(cm H2O): --    GENERAL PHYSICAL EXAM:  GEN: no distress  HEENT:  NCAT, OP clear  EYES: sclera white, conjunctiva clear, no nystagmus  NECK: supple  CV: RRR, no murmur     		  PULM: CTAB, no wheezing  ABD: soft, +BS, NT  EXT: peripheral pulse intact, no cyanosis  MSK: muscle tone and strength normal  SKIN: warm, dry, no rash or lesion on exposed skin    NEUROLOGICAL EXAM:  Mental Status  Orientation: alert and oriented to person, place, time, and situation   Language: clear and fluent, intact comprehension and repetition, intact naming and reading    Cranial Nerves  II: full visual fields intact   III, IV, VI: PERRL, EOMI  V, VII: facial sensation and movement intact and symmetric   VIII: hearing intact   IX, X: uvula midline, soft palate elevates normally   XI: BL shoulder shrug intact   XII: tongue midline    Motor  5/5 BUE and BLE                 Tone and bulk are normal in upper and lower limbs  No pronator drift    Sensation  Intact to light touch and pinprick in all 4 EXTs    Reflex  2+ in BL biceps and R patella  0 in L patella                                   Plantar responses mute bilaterally    Coordination  Normal FTN bilaterally    Gait  Deferred

## 2020-09-02 NOTE — PROGRESS NOTE ADULT - ASSESSMENT
70y Female who is followed by neurology because of ams.  Now resolved.     AMS  Resolved  Possible seizure vs infection, UA did not suggest UTI.  Continue Keppra 1500 mg bid, Vimpat 100 mg bid.    Chest pain  Ischemic workup per cardiology.    will follow with you    Masoud Dominique MD PhD   [id number]

## 2020-09-02 NOTE — SWALLOW BEDSIDE ASSESSMENT ADULT - SLP PERTINENT HISTORY OF CURRENT PROBLEM
The patient called requesting a call from Alicja RAM.    70 yr old female with atrial fibrillation, hyperlipidemia, hypertension, lung nodule, osteoarthritis, aortic stenosis, seizure disorder, s/p spinal cord stimulator was BIBEMS as she was found to be confused at home. In ED, concerns for dehydration and UTI. Ceftriaxone was initiated. CT head was unremarkable for acute changes. Neurology was consulted given seizure history. Pt reports h/o dysphagia, noting that she has had difficulty chewing. Pt reports ill-fitting dentition as well as facial numbness and "pins and needles" sensation which makes chewing difficuilty .Pt reports significant loss of liquids anteriorly, noting she "gets soaked" when eating and drinking. Pt reports significant weight loss.

## 2020-09-03 NOTE — PROGRESS NOTE ADULT - ASSESSMENT
70 year old female patient  with a history of HTN, HLD, mild-mod AS, COPD, spinal cord stimulator, normal coronaries by cardiac cath 3/2018, paroxysmal atrial fibrillation s/p ablation (CTI + PVI Dr. Arreguin 11/2019) and seizure disorder who presented with altered mental status. ILR interrogation demonstrated an episode of complete heart block without ventricular escape lasting 9 seconds on 8/7/2020. She does not remember having any events on that day. There is no suggestion of a vagal response.    Dr. Velasquez from Neurology/Epilepsy was concerned about possibility of Lacosamide causing AV block as it is a known side effect. Her episode was transient with no other surrounding second degree or third degree heart block. In my literature review, it appears that Lacosamide induced AV block appears to be progressive (second degree to third degree). Her sudden onset of heart block is more concerning for possible infranodal conduction disease.    I discussed the above at length with the patient. I informed her that I still think pacemaker would benefit her, especially leadless pacemaker given that I anticipate she will have low pacing burden/needs and that it has lower infectious risk. After reviewing all risks, benefits and alternatives, through shared decision making, she decided to pursue pacemaker implantation.    Recommendations:  - Hold Apixaban (pt in NSR)  - NPO after MN  - Leadless PM tomorrow AM    Discussed with Dr. Velasquez and Remy.    Bobo Barrientos MD  Clinical Cardiac Electrophysiology

## 2020-09-03 NOTE — PROGRESS NOTE ADULT - ASSESSMENT
71 y/o LH female with a history of HTN, HLD, hypothyroid, afib (on apixaban), AS, left breast CA s/p mastectomy and chemo 2010, bilateral lung nodules, spinal stimulator for pain (BUYSTAND, unclear if MRI compatible), polysubstance abuse (most recently snorting percocet), new onset focal epilepsy July of 2020 presented after being found naked and confused by neighbor. Admits to opioid abuse.      Impression:  1. AMS: H/o focal epilepsy characterized by focal aware motor seizures; all of previous seizures occurred with patient retaining her baseline wakefulness and alertness. Even though pt was only taking one antiepileptic medication at home when she was discharged with two, suspect AMS that brought pt to the hospital was due to snorting of percocet and not breakthrough seizure.    2. Episode of 9s complete heart block on 8/7. Per Dr. Barrientos's note, "Dr. Velasquez from Neurology/Epilepsy was concerned about possibility of Lacosamide causing AV block as it is a known side effect. Her episode was transient with no other surrounding second degree or third degree heart block. In my literature review, it appears that Lacosamide induced AV block appears to be progressive (second degree to third degree). Her sudden onset of heart block is more concerning for possible infranodal conduction disease."    3. Focal epilepsy  4. Unstable angina  5. Opioid abuse      Recommendation:  - off lacosamide   - cvEEG to monitor for clinically subtle seizures   - continue levetiracetam 1500mg BID  - seizure precaution  - pending leadless PM tomorrow   - son Jay has plan to take pt to substance rehab center after hospital discharge      Will continue to follow with you.    ______________________  Francine Velasquez MD   [facility]  Cell: [phone]  Epilepsy Consult #: [id number] ([phone])

## 2020-09-03 NOTE — PROGRESS NOTE ADULT - SUBJECTIVE AND OBJECTIVE BOX
[facility]                                                                     Francine Velasquez MD                                              Epilepsy Consult #: [phone] ([phone])                                               Office: [address] [place] [address], [address]                                                 Phone: [phone]; Fax: [phone]                            ==============================================    EPILEPSY FOLLOW-UP NOTE      INTERVAL HISTORY:  [facility] this morning w/o evidence for coronary heart disease. No overt clinical sz as we are rapidly tapering off lacosamide. Spoke with son Jay. Pt has been snorting percocet at home for about 1-2 yrs. Plan to take pt to a rehab after discharge.      MEDICATIONS:   acetaminophen    Suspension .. 650 milliGRAM(s) Oral every 6 hours PRN Mild Pain (1 - 3), Moderate Pain (4 - 6)  aspirin enteric coated 81 milliGRAM(s) Oral daily  atorvastatin 10 milliGRAM(s) Oral at bedtime  escitalopram 10 milliGRAM(s) Oral daily  haloperidol    Injectable 0.5 milliGRAM(s) IntraMuscular every 6 hours PRN Agitation  levETIRAcetam 1500 milliGRAM(s) Oral two times a day  levothyroxine 112 MICROGram(s) Oral daily  LORazepam   Injectable 1 milliGRAM(s) IV Push once PRN for seizure activity.  metoprolol tartrate 25 milliGRAM(s) Oral two times a day  oxycodone    5 mG/acetaminophen 325 mG 1 Tablet(s) Oral every 12 hours PRN Moderate Pain (4 - 6)  oxycodone    5 mG/acetaminophen 325 mG 1 Tablet(s) Oral once  pantoprazole    Tablet 40 milliGRAM(s) Oral before breakfast  tamsulosin 0.4 milliGRAM(s) Oral at bedtime    LAST 24-HR VITALS:  T(C): 36.6 (09-03-20 @ 21:37), Max: 36.7 (09-03-20 @ 06:28)  HR: 75 (09-03-20 @ 21:37) (66 - 85)  BP: 92/60 (09-03-20 @ 21:37) (92/60 - 159/67)  ABP: --  RR: 18 (09-03-20 @ 21:37) (17 - 18)  SpO2: 100% (09-03-20 @ 21:37) (97% - 100%)  CVP(cm H2O): --    GENERAL PHYSICAL EXAM:  GEN: no distress  HEENT:  NCAT, OP clear  EYES: sclera white, conjunctiva clear, no nystagmus  NECK: supple  CV: RRR, no murmur     		  PULM: CTAB, no wheezing  ABD: soft, +BS, NT  EXT: peripheral pulse intact, no cyanosis  MSK: muscle tone and strength normal  SKIN: warm, dry, no rash or lesion on exposed skin    NEUROLOGICAL EXAM:  Mental Status  Orientation: alert and oriented to person, place, time, and situation   Language: clear and fluent, intact comprehension and repetition, intact naming and reading    Cranial Nerves  II: full visual fields intact   III, IV, VI: PERRL, EOMI  V, VII: facial sensation and movement intact and symmetric   VIII: hearing intact   IX, X: uvula midline, soft palate elevates normally   XI: BL shoulder shrug intact   XII: tongue midline    Motor  5/5 BUE and BLE                 Tone and bulk are normal in upper and lower limbs  No pronator drift    Sensation  Intact to light touch and pinprick in all 4 EXTs    Reflex  2+ in BL biceps and patella                                    Plantar responses downward bilaterally    Coordination  Normal FTN bilaterally    Gait  Deferred       LABS:                          10.6   7.10  )-----------( 236      ( 03 Sep 2020 06:42 )             34.2     09-03    142  |  105  |  18.0  ----------------------------<  89  4.8   |  29.0  |  0.62    Ca    9.5      03 Sep 2020 06:42      CAPILLARY BLOOD GLUCOSE

## 2020-09-03 NOTE — PROGRESS NOTE ADULT - SUBJECTIVE AND OBJECTIVE BOX
[facility]                                        Neurology at [place]                                 [facility]                                  [address]                                        [place], [place], [address]                                             [phone]        CC: Encephalopathy    HPI:   The patient is a 70y Female who presented with AMS.  Per patient she went outside, was not aware that she was naked.  She was seen by a neighbor and was taken to hospital.  She is mostly amnestic of event.  She was seen last month with new diagnosis of seizure.  She denied having had seizures.    Interim history: no new neuro issues, no seizures    ROS neurology: Denies headache or dizziness. Denies weakness/numbness.  Denies speech/language deficits. Denies diplopia/blurred vision.  Denies confusion    MEDICATIONS  (STANDING):  apixaban 5 milliGRAM(s) Oral two times a day  aspirin enteric coated 81 milliGRAM(s) Oral daily  atorvastatin 10 milliGRAM(s) Oral at bedtime  escitalopram 10 milliGRAM(s) Oral daily  lacosamide 100 milliGRAM(s) Oral two times a day  levETIRAcetam 1500 milliGRAM(s) Oral two times a day  levothyroxine 112 MICROGram(s) Oral daily  metoprolol tartrate 25 milliGRAM(s) Oral two times a day  pantoprazole    Tablet 40 milliGRAM(s) Oral before breakfast  pregabalin 50 milliGRAM(s) Oral two times a day  tamsulosin 0.4 milliGRAM(s) Oral at bedtime    MEDICATIONS  (PRN):  acetaminophen    Suspension .. 650 milliGRAM(s) Oral every 6 hours PRN Mild Pain (1 - 3), Moderate Pain (4 - 6)  haloperidol    Injectable 0.5 milliGRAM(s) IntraMuscular every 6 hours PRN Agitation  LORazepam   Injectable 1 milliGRAM(s) IV Push once PRN for seizure activity.  oxycodone    5 mG/acetaminophen 325 mG 1 Tablet(s) Oral every 12 hours PRN Moderate Pain (4 - 6)    Vital Signs Last 24 Hrs  T(C): 36.4 (02 Sep 2020 09:52), Max: 36.5 (01 Sep 2020 21:26)  T(F): 97.6 (02 Sep 2020 09:52), Max: 97.7 (01 Sep 2020 21:26)  HR: 75 (02 Sep 2020 09:52) (75 - 707)  BP: 91/57 (02 Sep 2020 09:52) (91/57 - 117/72)  BP(mean): --  RR: 20 (02 Sep 2020 09:52) (18 - 20)  SpO2: 100% (02 Sep 2020 09:52) (98% - 100%)    Detailed Neurologic Exam:    Mental status: The patient is awake and alert. There is no aphasia. There is no dysarthria.     Cranial nerves: Pupils equal and react symmetrically to light. There is no visual field deficit to threat. Extraocular motion is full with no nystagmus. There is no ptosis. Facial sensation is intact. Facial musculature is symmetric. Palate elevates symmetrically. Tongue is midline.    Motor: There is normal bulk and tone.  There is no tremor.  Strength grossly 5/5 bilaterally.    Sensation: Grossly intact to light touch and pin.    Reflexes: 2+ throughout and plantar responses are flexor.    Cerebellar: No dysmetria on finger nose testing.

## 2020-09-03 NOTE — PROGRESS NOTE ADULT - SUBJECTIVE AND OBJECTIVE BOX
s/p LHC revealing non obstructive CAD( official report to follow )     Patient feels well.  Denies chest pain, shortness of breath, dizziness or palpitations at this time      Right radial hemoband in place.  Procedure site CDI, no bleeding, no hematoma, able to move all digits with capillary refill < 3 seconds, fingers warm    ICU Vital Signs Last 24 Hrs  T(C): 36.7 (03 Sep 2020 06:28), Max: 36.7 (03 Sep 2020 06:28)  T(F): 98.1 (03 Sep 2020 06:28), Max: 98.1 (03 Sep 2020 06:28)  HR: 73 (03 Sep 2020 08:30) (66 - 92)  BP: 140/63 (03 Sep 2020 08:30) (91/57 - 140/63)  RR: 18 (03 Sep 2020 08:30) (17 - 20)  SpO2: 97% (03 Sep 2020 08:30) (97% - 100%)    MEDICATIONS  (STANDING):  apixaban 5 milliGRAM(s) Oral two times a day  aspirin enteric coated 81 milliGRAM(s) Oral daily  atorvastatin 10 milliGRAM(s) Oral at bedtime  escitalopram 10 milliGRAM(s) Oral daily  levETIRAcetam 1500 milliGRAM(s) Oral two times a day  levothyroxine 112 MICROGram(s) Oral daily  metoprolol tartrate 25 milliGRAM(s) Oral two times a day  pantoprazole    Tablet 40 milliGRAM(s) Oral before breakfast  pregabalin 50 milliGRAM(s) Oral two times a day  tamsulosin 0.4 milliGRAM(s) Oral at bedtime      now s/p LHC revealing non obstructive CAD done Via RRA   vital signs, labs, diet and activity per post procedure orders  Plan for Micra PPM later today  with EPS Team following   wrist precautions   Maintain NPO   -continue telemetry  -continue hospitalist service  ambulate ad [place] post bedrest    -plan of care discussed with patient, family and MD   -post procedure and d/c instructions reviewed  - s/p LHC revealing non obstructive CAD( official report to follow )     Patient feels well.  Denies chest pain, shortness of breath, dizziness or palpitations at this time      Right radial hemo band in place.  Procedure site CDI, no bleeding, no hematoma, able to move all digits with capillary refill < 3 seconds, fingers warm    Medications during cath :  Versed 0.5 mg   fentanyl 12.5  Nitroglycerin 200mcg  Heparin 2000 units   Omnipaque 66cc       ICU Vital Signs Last 24 Hrs  T(C): 36.7 (03 Sep 2020 06:28), Max: 36.7 (03 Sep 2020 06:28)  T(F): 98.1 (03 Sep 2020 06:28), Max: 98.1 (03 Sep 2020 06:28)  HR: 73 (03 Sep 2020 08:30) (66 - 92)  BP: 140/63 (03 Sep 2020 08:30) (91/57 - 140/63)  RR: 18 (03 Sep 2020 08:30) (17 - 20)  SpO2: 97% (03 Sep 2020 08:30) (97% - 100%)    MEDICATIONS  (STANDING):  apixaban 5 milliGRAM(s) Oral two times a day  aspirin enteric coated 81 milliGRAM(s) Oral daily  atorvastatin 10 milliGRAM(s) Oral at bedtime  escitalopram 10 milliGRAM(s) Oral daily  levETIRAcetam 1500 milliGRAM(s) Oral two times a day  levothyroxine 112 MICROGram(s) Oral daily  metoprolol tartrate 25 milliGRAM(s) Oral two times a day  pantoprazole    Tablet 40 milliGRAM(s) Oral before breakfast  pregabalin 50 milliGRAM(s) Oral two times a day  tamsulosin 0.4 milliGRAM(s) Oral at bedtime      now s/p LHC revealing non obstructive CAD done Via RRA   vital signs, labs, diet and activity per post procedure orders  Plan for Micra PPM  on 9/4   with EPS Team following   wrist precautions   Maintain NPO after midnight   -continue telemetry  -continue hospitalist service  ambulate ad [place] post bedrest  -plan of care discussed with patient, family and MD   -post procedure and d/c instructions reviewed  -

## 2020-09-03 NOTE — CHART NOTE - NSCHARTNOTEFT_GEN_A_CORE
PRELIMINARY EEG REPORT    EEG reviewed from  - 21:00  Date: 09-03-20    no seizure episode seen    Go Isaac MD PGY-5  Epilepsy Fellow    This Preliminary report is based on fellow review. Final report pending attending review.

## 2020-09-03 NOTE — PROGRESS NOTE ADULT - SUBJECTIVE AND OBJECTIVE BOX
[place] [facility]                                                       [facility] [place]                                                      Office: [address] [facility]                                                       Telephone: [phone]. Fax:[phone]    Subjective: Pt feels weak but otherwise denies near syncope, chest pain, dyspnea. Cardiac cath in AM normal.    TELE: NSR    MEDICATIONS  (STANDING):  aspirin enteric coated 81 milliGRAM(s) Oral daily  atorvastatin 10 milliGRAM(s) Oral at bedtime  escitalopram 10 milliGRAM(s) Oral daily  levETIRAcetam 1500 milliGRAM(s) Oral two times a day  levothyroxine 112 MICROGram(s) Oral daily  metoprolol tartrate 25 milliGRAM(s) Oral two times a day  pantoprazole    Tablet 40 milliGRAM(s) Oral before breakfast  pregabalin 50 milliGRAM(s) Oral two times a day  tamsulosin 0.4 milliGRAM(s) Oral at bedtime    MEDICATIONS  (PRN):  acetaminophen    Suspension .. 650 milliGRAM(s) Oral every 6 hours PRN Mild Pain (1 - 3), Moderate Pain (4 - 6)  haloperidol    Injectable 0.5 milliGRAM(s) IntraMuscular every 6 hours PRN Agitation  LORazepam   Injectable 1 milliGRAM(s) IV Push once PRN for seizure activity.  oxycodone    5 mG/acetaminophen 325 mG 1 Tablet(s) Oral every 12 hours PRN Moderate Pain (4 - 6)    Allergies  No Known Allergies    Intolerances  chocolate ensure please. (Unknown)    Vital Signs Last 24 Hrs  T(C): 36.4 (03 Sep 2020 10:30), Max: 36.7 (03 Sep 2020 06:28)  T(F): 97.5 (03 Sep 2020 10:30), Max: 98.1 (03 Sep 2020 06:28)  HR: 85 (03 Sep 2020 10:30) (66 - 92)  BP: 98/68 (03 Sep 2020 10:30) (91/57 - 159/67)  BP(mean): --  RR: 18 (03 Sep 2020 10:30) (17 - 18)  SpO2: 100% (03 Sep 2020 10:30) (97% - 100%)    Physical Exam:  Constitutional: NAD, AAOx3  Cardiovascular: +S1S2, RRR, no murmurs, rubs, galllops  Pulmonary: CTA b/l, unlabored, no rhonchi, rales or wheezes  GI: soft NTND +BS  Extremities: no pedal edema  Neuro: non focal, GUSTAFSON x4    LABS:                        10.6   7.10  )-----------( 236      ( 03 Sep 2020 06:42 )             34.2     09-03    142  |  105  |  18.0  ----------------------------<  89  4.8   |  29.0  |  0.62    Ca    9.5      03 Sep 2020 06:42    RADIOLOGY & ADDITIONAL TESTS:  < from: TTE Echo Complete w/ Contrast w/ Doppler (08.31.20 @ 09:24) >  Summary:   1. Endocardial visualization was enhanced with intravenous echo contrast.   2. Low normal global left ventricular systolic function.   3. Left ventricular ejection fraction, by visual estimation, is 50%.   4. Normal left ventricular internal cavity size.   5. Normal right ventricle size and systolic function.   6. Moderately enlarged left atrium.   7. Normal right atrial size.   8. Moderate thickening and calcification of the anterior and posterior mitral valve leaflets.   9. Severe mitral annular calcification.  10. Mild mitral valve regurgitation.  11. Aortic valve leaflet calcification. Moderate aortic stenosis (peak velocity 2.4 m/s, mean gradient 13 mmHg, calculated ANGEL by continuity equation 1.0 cm^2, dimensionless index 0.34, stroke volume index 35 mL/m^2).  12. Moderate aortic regurgitation.  13. There is no evidence of pericardial effusion.  14. Recommend clinical correlation with the above findings.    Sandra Brito MD Electronically signed on 8/31/2020 at 11:03:20 AM    < end of copied text >

## 2020-09-03 NOTE — PROGRESS NOTE ADULT - ASSESSMENT
70y Female who is followed by neurology because of ams.  Now resolved.     AMS  Resolved  Possible seizure vs infection, UA did not suggest UTI.  Epilepsy service following   Continue Keppra 1500 mg bid, Vimpat 100 mg bid.    Chest pain  Ischemic workup per cardiology.  cath normal  PM insertion tomorrow    will follow with you    Masoud Dominique MD PhD   [phone]

## 2020-09-03 NOTE — PROGRESS NOTE ADULT - ASSESSMENT
Patient presented with AMS likely secondary to seizures vs arrhythmogenic source; ILR interrogated and recording of high grade AV block with 9 seconds pause on 8/7/2020 at 906AM. EP following patient may require PPM prior to discharge. No acute events overnight and no tele events overnight   Patient had nuclear stress testing done which showed mild apical ischemia with preserved LVEF and LHC unrevealing for any evidence of CAD; plan for MICRA later today     A/P   1. Atypical Chest pain   - EKG reviewed normal sinus rhythm with no acute changes and LHC is unrevealing for CAD   - on ASA and Lipitor   - TTE reviewed shows LVEF 50% with enlarged LA with mild MR mod AS (peak velocity 2.4 m/s, mean gradient 13 mmHg, calculated ANGEL by continuity equation 1.0 cm^2) and mod AR   - Nuclear stress imaging shows mild apical ischemia with preserved LVEF; LHC shows no evidence of CAD   - continue with Lopressor 25mg po q 12hrs    2. Paroxsymal AF on Eliquis   - sinus rhythm / sinus tachycardia on tele   - Eliquis on HOLD resume post procedure   - continue BB   - ILR shows evidence of high degree AVB and pauses; MICRA placement by EP later today     3. AMS likely secondary to seizures vs arrhythmogenic (tachy -kalani) vs infectious etiology ( less likely)   - at baseline mental status   - CT head negative for any acute intracranial pathology   - Neurology follow up appreciated   - on Keppra and Vimpat   - ILR interrogated and evidence of bradyarrhythmia     4. Numbness of the bilateral lower extremities likely secondary to neuropathy   - no concerns for PAD (pulses palpated bilaterally)   - Vitamin B12 and Folate within normal limits     Thank You   Deidre Fisher D.O.   Cardiology Attending   # [phone]

## 2020-09-03 NOTE — PROGRESS NOTE ADULT - SUBJECTIVE AND OBJECTIVE BOX
[facility]                                                               Office: [address], [place] [address]                                                              Telephone: [phone]. Fax:[phone]                                                                             PROGRESS NOTE  Reason for follow up: CHB with AMS and abnormal nuclear stress test   Overnight: No new events.   Update: [facility] shows no evidence coronary artery disease and now awaiting EP eval and possible placement of MICRA     	  Vitals:  T(C): 36.7 (09-03-20 @ 06:28), Max: 36.7 (09-03-20 @ 06:28)  HR: 71 (09-03-20 @ 08:15) (66 - 92)  BP: 136/65 (09-03-20 @ 08:15) (91/57 - 136/65)  RR: 18 (09-03-20 @ 08:15) (17 - 20)  SpO2: 98% (09-03-20 @ 08:15) (98% - 100%)  Wt(kg): --  I&O's Summary    02 Sep 2020 07:01  -  03 Sep 2020 07:00  --------------------------------------------------------  IN: 180 mL / OUT: 2350 mL / NET: -2170 mL            PHYSICAL EXAM:  Appearance: Comfortable. No acute distress  HEENT:  Head and neck: Atraumatic. Normocephalic.  Normal oral mucosa, PERRL, Neck is supple. No JVD, No carotid bruit.   Neurologic: A & O x 3, no focal deficits. EOMI.  Lymphatic: No cervical lymphadenopathy  Cardiovascular: Normal S1 S2, No murmur, rubs/gallops. No JVD, No edema  Respiratory: Lungs clear to auscultation  Gastrointestinal:  Soft, Non-tender, + BS  Lower Extremities: No edema  Psychiatry: Patient is calm. No agitation. Mood & affect appropriate  Skin: No rashes/ ecchymoses/cyanosis/ulcers visualized on the face, hands or feet.      CURRENT MEDICATIONS:  metoprolol tartrate 25 milliGRAM(s) Oral two times a day  tamsulosin 0.4 milliGRAM(s) Oral at bedtime    escitalopram  levETIRAcetam  pregabalin  pantoprazole    Tablet  atorvastatin  levothyroxine  apixaban  aspirin  chewable  aspirin enteric coated      DIAGNOSTIC TESTING:  [ x] Echocardiogram: < from: TTE Echo Complete w/ Contrast w/ Doppler (08.31.20 @ 09:24) >  < from: TTE Echo Complete w/ Contrast w/ Doppler (08.31.20 @ 09:24) >  PHYSICIAN INTERPRETATION:  Left Ventricle: Endocardial visualization was enhanced with intravenous echo contrast. The left ventricular internal cavity size is normal. Left ventricular wall thickness is normal.  Global LV systolic function was low normal. Left ventricular ejection fraction, by visual estimation, is 50%.  Right Ventricle: Normal right ventricular size and systolic function.  Left Atrium: Moderately enlarged left atrium.  Right Atrium: Normal right atrial size.  Pericardium: There is no evidence of pericardial effusion.  Mitral Valve: Moderate thickening and calcification of the anterior and posterior mitral valve leaflets. There is severe mitral annular calcification. Mild mitral valve regurgitation is seen.  Tricuspid Valve: The tricuspid valve is normal in structure. Trivial tricuspid regurgitation is visualized. Adequate TR velocity was not obtained to accurately assess RVSP.  Aortic Valve: The aortic valve was not well visualized. Moderate aortic valve regurgitation is seen. Aortic valve leaflet calcification. Moderate aortic stenosis (peak velocity 2.4 m/s, mean gradient 13 mmHg, calculated ANGEL by continuity equation 1.0 cm^2, dimensionless index 0.34, stroke volume index 35 mL/m^2).  Pulmonic Valve: The pulmonic valve was not well visualized.  Venous: The inferior vena cava was normal sized, with respiratory size variation greater than 50%.      Summary:   1. Endocardial visualization was enhanced with intravenous echo contrast.   2. Low normal global left ventricular systolic function.   3. Left ventricular ejection fraction, by visual estimation, is 50%.   4. Normal left ventricular internal cavity size.   5. Normal right ventricle size and systolic function.   6. Moderately enlarged left atrium.   7. Normal right atrial size.   8. Moderate thickening and calcification of the anterior and posterior mitral valve leaflets.   9. Severe mitral annular calcification.  10. Mild mitral valve regurgitation.  11. Aortic valve leaflet calcification. Moderate aortic stenosis (peak velocity 2.4 m/s, mean gradient 13 mmHg, calculated ANGEL by continuity equation 1.0 cm^2, dimensionless index 0.34, stroke volume index 35 mL/m^2).  12. Moderate aortic regurgitation.  13. There is no evidence of pericardial effusion.  14. Recommend clinical correlation with the above findings.    < end of copied text >    [ ]  Catheterization:  [ ] Stress Test:    OTHER: 	      LABS:	 	                            10.6   7.10  )-----------( 236      ( 03 Sep 2020 06:42 )             34.2     09-03    142  |  105  |  18.0  ----------------------------<  89  4.8   |  29.0  |  0.62    Ca    9.5      03 Sep 2020 06:42      proBNP:   Lipid Profile:   HgA1c:   TSH:       TELEMETRY: Reviewed    ECG:  Reviewed by me.

## 2020-09-03 NOTE — PROGRESS NOTE ADULT - SUBJECTIVE AND OBJECTIVE BOX
INTERVAL HPI/OVERNIGHT EVENTS:    CC: chest pain, UTI, seizure disorder, atrial fibrillation, hyperlipidemia, hypertension      No overnight events, no new complaints. Discussed need to discontinue Vimpat.    No overnight events, cath done this am    Vital Signs Last 24 Hrs  T(C): 36.4 (03 Sep 2020 10:30), Max: 36.7 (03 Sep 2020 06:28)  T(F): 97.5 (03 Sep 2020 10:30), Max: 98.1 (03 Sep 2020 06:28)  HR: 85 (03 Sep 2020 10:30) (66 - 92)  BP: 98/68 (03 Sep 2020 10:30) (91/57 - 159/67)  BP(mean): --  RR: 18 (03 Sep 2020 10:30) (17 - 18)  SpO2: 100% (03 Sep 2020 10:30) (97% - 100%)    PHYSICAL EXAM:    GENERAL: alert, not in distress  CHEST/LUNG: b/l air entry  HEART: regular  ABDOMEN: soft, bs+  EXTREMITIES:  no edema, tenderness    MEDICATIONS  (STANDING):  apixaban 5 milliGRAM(s) Oral two times a day  aspirin enteric coated 81 milliGRAM(s) Oral daily  atorvastatin 10 milliGRAM(s) Oral at bedtime  escitalopram 10 milliGRAM(s) Oral daily  levETIRAcetam 1500 milliGRAM(s) Oral two times a day  levothyroxine 112 MICROGram(s) Oral daily  metoprolol tartrate 25 milliGRAM(s) Oral two times a day  pantoprazole    Tablet 40 milliGRAM(s) Oral before breakfast  pregabalin 50 milliGRAM(s) Oral two times a day  tamsulosin 0.4 milliGRAM(s) Oral at bedtime    MEDICATIONS  (PRN):  acetaminophen    Suspension .. 650 milliGRAM(s) Oral every 6 hours PRN Mild Pain (1 - 3), Moderate Pain (4 - 6)  haloperidol    Injectable 0.5 milliGRAM(s) IntraMuscular every 6 hours PRN Agitation  LORazepam   Injectable 1 milliGRAM(s) IV Push once PRN for seizure activity.  oxycodone    5 mG/acetaminophen 325 mG 1 Tablet(s) Oral every 12 hours PRN Moderate Pain (4 - 6)      Allergies    No Known Allergies    Intolerances    chocolate ensure please. (Unknown)        LABS:                          10.6   7.10  )-----------( 236      ( 03 Sep 2020 06:42 )             34.2     09-03    142  |  105  |  18.0  ----------------------------<  89  4.8   |  29.0  |  0.62    Ca    9.5      03 Sep 2020 06:42            RADIOLOGY & ADDITIONAL TESTS:

## 2020-09-03 NOTE — PROGRESS NOTE ADULT - ASSESSMENT
70 yr old female with atrial fibrillation, hyperlipidemia, hypertension, lung nodule, osteoarthritis, aortic stenosis, seizure disorder, s/p spinal cord stimulator was BIBEMS as she was found to be confused at home. In ED, concerns for dehydration and UTI. Ceftriaxone was initiated. CT head was unremarkable for acute changes. Neurology was consulted given seizure history. Keppra dose was increased. She developed chest pain, was scheduled for stress testing as an outpatient. Cardiology was consulted, advised inpatient ECHO and stress testing. She completed her antibiotic course. PT was consulted, advised home PT. Stress test was done, revealed apical ischemia, cardiac cath showed non obstructive CAD. Loop recorder interrogation revealed 9 s pause on Aug 7, EP was consulted, advised possible PPM placement. Epilepsy consult was requested, advised to discontinue Vimpat given side effects like heart block.
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
## 2020-09-04 NOTE — PROGRESS NOTE ADULT - PROBLEM SELECTOR PLAN 4
Continue Keppra and Vimpat, epilepsy consult requested on family request.
Continue Keppra, discontinue Vimpat, epilepsy consultation appreciated.
Continue Keppra, discontinue Vimpat, epilepsy consultation appreciated.
Continue Alva, neurology follow up noted.

## 2020-09-04 NOTE — PROGRESS NOTE ADULT - PROBLEM SELECTOR PLAN 7
Appears chronic, awaiting speech and swallow evaluation.
Appears chronic, awaiting speech and swallow evaluation.
Appears chronic, pureed diet.
Appears chronic, will request speech and swallow evaluation.

## 2020-09-04 NOTE — DIETITIAN INITIAL EVALUATION ADULT. - ETIOLOGY
related to inability to consume sufficient protein energy intake with decreased appetite in setting of AMS prior to admission/pain med abuse and dysphagia

## 2020-09-04 NOTE — PROGRESS NOTE ADULT - SUBJECTIVE AND OBJECTIVE BOX
INTERVAL HPI/OVERNIGHT EVENTS:    CC: atrial fibrillation s/p pacemaker implant, hypertension, seizure disorder opioid dependence      No overnight events, seen in cath lab. s/p pm placement, denies chest pain.     Vital Signs Last 24 Hrs  T(C): 36.7 (04 Sep 2020 09:20), Max: 36.7 (04 Sep 2020 06:55)  T(F): 98.1 (04 Sep 2020 09:20), Max: 98.1 (04 Sep 2020 09:20)  HR: 84 (04 Sep 2020 14:41) (72 - 84)  BP: 90/53 (04 Sep 2020 14:41) (90/53 - 137/64)  BP(mean): --  RR: 16 (04 Sep 2020 14:41) (12 - 18)  SpO2: 98% (04 Sep 2020 14:41) (98% - 100%)    PHYSICAL EXAM:    GENERAL: alert, not in distress  CHEST/LUNG: b/l air entry  HEART: regular  ABDOMEN: soft, bs+  EXTREMITIES:  no edema, tenderness    MEDICATIONS  (STANDING):  apixaban 5 milliGRAM(s) Oral <User Schedule>  aspirin enteric coated 81 milliGRAM(s) Oral daily  atorvastatin 10 milliGRAM(s) Oral at bedtime  ceFAZolin   IVPB 2000 milliGRAM(s) IV Intermittent every 8 hours  escitalopram 10 milliGRAM(s) Oral daily  levETIRAcetam 1500 milliGRAM(s) Oral two times a day  levothyroxine 112 MICROGram(s) Oral daily  metoprolol tartrate 25 milliGRAM(s) Oral two times a day  pantoprazole    Tablet 40 milliGRAM(s) Oral before breakfast  tamsulosin 0.4 milliGRAM(s) Oral at bedtime    MEDICATIONS  (PRN):  acetaminophen    Suspension .. 650 milliGRAM(s) Oral every 6 hours PRN Mild Pain (1 - 3), Moderate Pain (4 - 6)  haloperidol    Injectable 0.5 milliGRAM(s) IntraMuscular every 6 hours PRN Agitation  oxycodone    5 mG/acetaminophen 325 mG 1 Tablet(s) Oral every 12 hours PRN Moderate Pain (4 - 6)      Allergies    No Known Allergies    Intolerances    chocolate ensure please. (Unknown)        LABS:                          10.6   7.10  )-----------( 236      ( 03 Sep 2020 06:42 )             34.2     09-03    142  |  105  |  18.0  ----------------------------<  89  4.8   |  29.0  |  0.62    Ca    9.5      03 Sep 2020 06:42            RADIOLOGY & ADDITIONAL TESTS:

## 2020-09-04 NOTE — EEG REPORT - NS EEG TEXT BOX
[facility]   COMPREHENSIVE EPILEPSY CENTER   REPORT OF LONG-TERM VIDEO EEG     [facility]: [address], [place] [address], Ph#: [phone]  [facility]: [id number], [place] [address], Ph#: [phone]  [facility]: [address] [place] [address], Ph#: [phone]    Patient Name: SKYLER OSUNA  Age and : 70y (54)  MRN #: [id number]  Location: [address] [address]  Referring Physician: Lazara Alberto    Start Time/Date: 15:38 on 20  End Time/Date: 21:49 on 20  Duration: 6hr 11m    _____________________________________________________________  STUDY INFORMATION    EEG Recording Technique:  The patient underwent continuous Video-EEG monitoring, using Telemetry System hardware on the XLTek Digital System. EEG and video data were stored on a computer hard drive with important events saved in digital archive files. The material was reviewed by a physician (electroencephalographer / epileptologist) on a daily basis. Andrew and seizure detection algorithms were utilized and reviewed. An EEG Technician attended to the patient, and was available throughout daytime work hours.  The epilepsy center neurologist was available in person or on call 24-hours per day.    EEG Placement and Labeling of Electrodes:  The EEG was performed utilizing 20 channel referential EEG connections (coronal over temporal over parasagittal montage) using all standard 10-20 electrode placements with EKG, with additional electrodes placed in the inferior temporal region using the modified 10-10 montage electrode placements for elective admissions, or if deemed necessary. Recording was at a sampling rate of 256 samples per second per channel. Time synchronized digital video recording was done simultaneously with EEG recording. A low light infrared camera was used for low light recording.     _____________________________________________________________  HISTORY    Patient is a 70y old  Female who presents with a chief complaint of AMS (04 Sep 2020 17:21)      PERTINENT MEDICATION:  levETIRAcetam 1500 milliGRAM(s) Oral two times a day  pregabalin 50 milliGRAM(s) Oral two times a day    _____________________________________________________________  STUDY INTERPRETATION    Findings: The background was continuous, spontaneously variable and reactive. During wakefulness, the posterior dominant rhythm consisted of fragments of 7 Hz activity, with amplitude to 30 uV.      Background Slowing:  No generalized background slowing was present.    Focal Slowing:   Intermittent, independent polymorphic delta slowing in the right (max F8/T8) >> left (max F7/T7) regions.    Sleep Background:  Drowsiness and stage II sleep transients were not recorded.    Other Non-Epileptiform Findings:  None were present.    Interictal Epileptiform Activity:   None were present.    Events:  Clinical events: None recorded.  Seizures: None recorded.    Activation Procedures:   Hyperventilation was not performed.    Photic stimulation was not performed.     Artifacts:  Intermittent myogenic and movement artifacts were noted.    ECG:  The heart rate on single channel ECG was predominantly between 80-90 BPM.    _____________________________________________________________  EEG SUMMARY/CLASSIFICATION    Abnormal EEG in the awake state.  - Intermittent, independent polymorphic delta slowing in the right (max F8/T8) >> left (max F7/T7) regions.  - Mild generalized slowing.    _____________________________________________________________  EEG IMPRESSION/CLINICAL CORRELATE    Abnormal EEG study.  1. Functional abnormality in the right >> left regions.  2. Mild nonspecific diffuse or multifocal cerebral dysfunction.   3. No epileptiform pattern or seizure seen.    _____________________________________________________________    Francine Velasquez MD  Attending Physician, [facility] [place]

## 2020-09-04 NOTE — PROGRESS NOTE ADULT - PROBLEM SELECTOR PLAN 1
Stress test showing apical ischemia, non obstructive CAD on cardiac cath.
Stress test showing apical ischemia, non obstructive CAD.
Stress test showing apical ischemia. Continue statin and beta blocker. Plan for cardiac cath in am.
Stress test showing apical ischemia. Continue statin and beta blocker. Cardiology to follow up regarding further recommendations about abnormal stress test.

## 2020-09-04 NOTE — PROGRESS NOTE ADULT - PROBLEM SELECTOR PLAN 2
Resolved, completed antibiotics.

## 2020-09-04 NOTE — DIETITIAN INITIAL EVALUATION ADULT. - PERTINENT LABORATORY DATA
09-03 Na142 mmol/L Glu 89 mg/dL K+ 4.8 mmol/L Cr  0.62 mg/dL BUN 18.0 mg/dL Phos n/a   Alb n/a   PAB n/a

## 2020-09-04 NOTE — PROGRESS NOTE ADULT - PROBLEM SELECTOR PLAN 3
Continue  Eliquis and Metoprolol.
Continue Metoprolol. Resume Eliquis when cleared by cardiology, possible PPM for 9 s pause on Aug 7.
s/p pacemaker placement, resume Eliquis this pm.
Continue  Eliquis and Metoprolol.

## 2020-09-04 NOTE — PROGRESS NOTE ADULT - ASSESSMENT
NP assessment  69 y/o female BIBA for AMS as per history pt. was found naked  and confused by neighbor and ambulance was called. At the time of admission pt. stated that she is ok, po intake has been poor. no abd. pain. no n/v/d per history. no fever. pt. not giving good history and history obtained from ER chart and ER physician. pt. reports no fall. It appears pt. is more alert in the ER likely after getting hydration. At the time of admission pt. knows where she is at, year 2020 and Nikita is the president.   Presented with altered mental status. ILR interrogation demonstrated an episode of complete heart block without ventricular escape lasting 9 seconds on 8/7/2020 without suggestion of associated vagal response,   9/3 S/P cardiac cath:  S/p LHC revealing non obstructive CAD done Via R[facility]   9/4 S/P PPM: S/P  [employer]tronic Micra Leadless Pacemaker Implant    -continue telemetry  -continue hospitalist service  ambulate ad [place] post bedrest  -Radial precautions  -Followed by EP for PPM    -post procedure and d/c instructions reviewed

## 2020-09-04 NOTE — PROGRESS NOTE ADULT - ATTENDING COMMENTS
I have personally seen, examined, and participated in the care of this patient. I have reviewed all pertinent clinical information, including history, physical exam, plan, and the PA/NP's note and agree except as noted below.    Successful leadless cardiac pacemaker implantation, doing well.  2vCXR before dc.  Outpt follow up.    Bobo Barrientos MD  Clinical Cardiac Electrophysiology
I have personally seen, examined, and participated in the care of this patient. I have reviewed all pertinent clinical information, including history, physical exam, plan, and the PA/NP's note and agree except as noted below.    70 year old female patient  with a history of HTN, HLD, mild-mod AS, COPD, spinal cord stimulator, normal coronaries by cardiac cath 3/2018, paroxysmal atrial fibrillation s/p ablation (CTI + PVI Dr. Arreguin 11/2019) and seizure disorder who presented with altered mental status. ILR interrogation demonstrated an episode of complete heart block without ventricular escape lasting 9 seconds on 8/7/2020. She does not remember having any events on that day. There is no suggestion of a vagal response. Given this finding, she would benefit most with pacemaker implantation. Considering that she likely needs infrequent pacing, she would benefit with leadless cardiac pacemaker implantation, which we can plan to do before discharge (? Friday).    Bobo Barrientos MD  Clinical Cardiac Electrophysiology
Patient was seen and examined by me, Dr. Fisher     Patient presented with AMS likely secondary to seizures vs arrhythmogenic source; ILR interrogated and recording of high grade AV block with 9 seconds pause on 8/7/2020 at 906AM. EP following patient may require PPM prior to discharge. No acute events overnight and no tele events overnight   Patient had nuclear stress testing done which showed mild apical ischemia with preserved LVEF and thus plan for LHC.    A/P   1. Unstable Angina   - EKG reviewed normal sinus rhythm with no acute changes   - on ASA and Lipitor   - TTE reviewed shows LVEF 50% with enlarged LA with mild MR mod AS (peak velocity 2.4 m/s, mean gradient 13 mmHg, calculated ANGEL by continuity equation 1.0 cm^2) and mod AR   - Nuclear stress imaging shows mild apical ischemia with preserved LVEF; scheduled for LHC in AM (HOLD Eliquis after 6pm dose)  - continue with Lopressor 25mg po q 12hrs    2. PAF on Eliquis   - sinus rhythm / sinus tachycardia on tele   - hold Eliquis after 6 pm dose tonight   - continue BB   - ILR shows evidence of high degree AVB and pauses; EP following     3. AMS likely secondary to seizures vs arrhythmogenic (tachy -kalani) vs infectious etiology ( less likely)   - at baseline mental status   - CT head negative for any acute intracranial pathology   - Neurology follow up appreciated   - on Keppra and Vimpat   - ILR interrogated and evidence of bradyarrhythmia     4. Numbness of the bilateral lower extremities likely secondary to neuropathy   - no concerns for PAD (pulses palpated bilaterally)   - Vitamin B12 and Folate within normal limits     Thank You   Deidre Fisher D.O.   Cardiology Attending   # [phone]
Discussed with patient and RN plan of care.
Discussed with patient plan of care.  Updated son.
Discussed with patient and RN.
Discussed with patient, EP and neurology.  Anticipate discharge in am  Updated son Demian

## 2020-09-04 NOTE — PROGRESS NOTE ADULT - ASSESSMENT
Patient presented with AMS likely secondary to seizures vs arrhythmogenic source; ILR interrogated and recording of high grade AV block with 9 seconds pause on 8/7/2020 at 906AM. EP following patient may require PPM prior to discharge. No acute events overnight and no tele events overnight   Patient had nuclear stress testing done which showed mild apical ischemia with preserved LVEF and LHC unrevealing for any evidence of CAD; s/p MICRA     A/P   1. Atypical Chest pain   - EKG reviewed normal sinus rhythm with no acute changes and LHC is unrevealing for CAD   - on ASA and Lipitor   - TTE reviewed shows LVEF 50% with enlarged LA with mild MR mod AS (peak velocity 2.4 m/s, mean gradient 13 mmHg, calculated ANGEL by continuity equation 1.0 cm^2) and mod AR   - Nuclear stress imaging shows mild apical ischemia with preserved LVEF; LHC shows no evidence of CAD   - continue with Lopressor 25mg po q 12hrs    2. Paroxsymal AF on Eliquis   - sinus rhythm / sinus tachycardia on tele   - Eliquis 5mg po q 12hrs resume PM dose as per EP   - continue BB   - ILR shows evidence of high degree AVB and pauses; s/p MICRA placement today with Dr. Barrientos     3. AMS likely secondary to seizures vs arrhythmogenic (tachy -kalani) vs infectious etiology ( less likely)   - at baseline mental status   - CT head negative for any acute intracranial pathology   - Neurology follow up appreciated   - on Keppra and Vimpat   - pending overnight EEG results     4. Numbness of the bilateral lower extremities likely secondary to neuropathy   - no concerns for PAD (pulses palpated bilaterally)   - Vitamin B12 and Folate within normal limits     Monitor for 24 hrs and safe for d/c from a cardiology perspective if no contraindication for EP standpoint       Thank You   Deidre Fisher D.O.   Cardiology Attending   # [phone]

## 2020-09-04 NOTE — PROGRESS NOTE ADULT - SUBJECTIVE AND OBJECTIVE BOX
[place] [facility]                                                               Office: [address] [address]                                                              Telephone: [phone]. Fax:[phone]                                                                             PROGRESS NOTE  Reason for follow up: AMS w/ abnormal stress test with [facility] with Afib with RVR   Overnight: No new events.   Update:   Patient is s/p LHC which was negative for any CAD and s/p MICRA today with Dr. Barrientos     Subjective: "  ______________________"      	  Vitals:  T(C): 36.7 (09-04-20 @ 06:55), Max: 36.7 (09-04-20 @ 06:55)  HR: 74 (09-04-20 @ 10:05) (72 - 85)  BP: 105/53 (09-04-20 @ 10:05) (92/60 - 137/64)  RR: 12 (09-04-20 @ 10:05) (12 - 18)  SpO2: 100% (09-04-20 @ 10:05) (99% - 100%)  Wt(kg): --  I&O's Summary    03 Sep 2020 07:01  -  04 Sep 2020 07:00  --------------------------------------------------------  IN: 660 mL / OUT: 600 mL / NET: 60 mL            PHYSICAL EXAM:  Appearance: Comfortable. No acute distress  HEENT:  Head and neck: Atraumatic. Normocephalic.  Normal oral mucosa, PERRL, Neck is supple. No JVD, No carotid bruit.   Neurologic: A & O x 3, no focal deficits. EOMI.  Lymphatic: No cervical lymphadenopathy  Cardiovascular: Normal S1 S2, irregularly irregular No murmur, rubs/gallops. No JVD, No edema  Respiratory: Lungs clear to auscultation  Gastrointestinal:  Soft, Non-tender, + BS  Lower Extremities: No edema  Psychiatry: Patient is calm. No agitation. Mood & affect appropriate  Skin: No rashes/ ecchymoses/cyanosis/ulcers visualized on the face, hands or feet.      CURRENT MEDICATIONS:  metoprolol tartrate 25 milliGRAM(s) Oral two times a day  tamsulosin 0.4 milliGRAM(s) Oral at bedtime    ceFAZolin   IVPB  escitalopram  levETIRAcetam  pantoprazole    Tablet  atorvastatin  levothyroxine  apixaban  aspirin enteric coated      DIAGNOSTIC TESTING:  [x ] Echocardiogram:   < from: TTE Echo Complete w/ Contrast w/ Doppler (08.31.20 @ 09:24) >   1. Endocardial visualization was enhanced with intravenous echo contrast.   2. Low normal global left ventricular systolic function.   3. Left ventricular ejection fraction, by visual estimation, is 50%.   4. Normal left ventricular internal cavity size.   5. Normal right ventricle size and systolic function.   6. Moderately enlarged left atrium.   7. Normal right atrial size.   8. Moderate thickening and calcification of the anterior and posterior mitral valve leaflets.   9. Severe mitral annular calcification.  10. Mild mitral valve regurgitation.  11. Aortic valve leaflet calcification. Moderate aortic stenosis (peak velocity 2.4 m/s, mean gradient 13 mmHg, calculated ANGEL by continuity equation 1.0 cm^2, dimensionless index 0.34, stroke volume index 35 mL/m^2).  12. Moderate aortic regurgitation.  13. There is no evidence of pericardial effusion.  14. Recommend clinical correlation with the above findings.    < end of copied text >    [x ]  Catheterization:    [x ] Stress Test:    < from: Nuclear Stress Test-Pharmacologic (09.01.20 @ 08:12) >  IMPRESSIONS:Abnormal Study  * Chest Pain: No chest pain with administration of  Regadenoson.  * Symptom: Shortness of breath.  * HR Response: Appropriate.  * BP Response: Appropriate.  * Heart Rhythm: Normal Sinus Rhythm - 71 BPM.  * Baseline ECG: Normal axis, septal infarct, no  significant ST/T wave abnormality.  * ECG Abnormalities: There were no diagnostic changes.  * Arrhythmia: None.  * Mild apical ischemia.  * Post-stress resting myocardial perfusion gated SPECT  imaging was performed (LVEF = 58 %;LVEDV = 85 ml.)    Conclusion:  Mild apical ischemia.    < end of copied text >      OTHER: 	      LABS:	 	                            10.6   7.10  )-----------( 236      ( 03 Sep 2020 06:42 )             34.2     09-03    142  |  105  |  18.0  ----------------------------<  89  4.8   |  29.0  |  0.62    Ca    9.5      03 Sep 2020 06:42      proBNP:   Lipid Profile:   HgA1c:   TSH:       TELEMETRY: Reviewed    ECG:  Reviewed by me.

## 2020-09-04 NOTE — PROGRESS NOTE ADULT - ASSESSMENT
70 yr old female with atrial fibrillation, hyperlipidemia, hypertension, lung nodule, osteoarthritis, aortic stenosis, seizure disorder, s/p spinal cord stimulator was BIBEMS as she was found to be confused at home. In ED, concerns for dehydration and UTI. Ceftriaxone was initiated. CT head was unremarkable for acute changes. Neurology was consulted given seizure history. Keppra dose was increased. She developed chest pain, was scheduled for stress testing as an outpatient. Cardiology was consulted, advised inpatient ECHO and stress testing. She completed her antibiotic course. PT was consulted, advised home PT. Stress test was done, revealed apical ischemia, cardiac cath showed non obstructive CAD. Loop recorder interrogation revealed 9 s pause on Aug 7, EP was consulted, advised possible PPM placement. Epilepsy consult was requested, advised to discontinue Vimpat given side effects like heart block. Decision made to proceed with pacemaker placement. She underwent Medtronic Micra Leadless Pacemaker Implant on 9/4, EP advised to resume AC.

## 2020-09-04 NOTE — PROGRESS NOTE ADULT - SUBJECTIVE AND OBJECTIVE BOX
PROCEDURE(S): Medtronic Micra Leadless Pacemaker Implant    ELECTRPHYSIOLOGIST(S): Bobo Barrientos MD    COMPLICATIONS:  none          DISPOSITION:  Observation Unit           CONDITION: Stable      Pt doing well s/p MDT Micra implant via right femoral vein. Denies complaint    Exam:   T(C): 36.7 (09-04-20 @ 06:55), Max: 36.7 (09-04-20 @ 06:55)  HR: 75 (09-04-20 @ 09:35) (72 - 85)  BP: 104/52 (09-04-20 @ 09:35) (92/60 - 137/64)  RR: 14 (09-04-20 @ 09:35) (14 - 18)  SpO2: 100% (09-04-20 @ 09:35) (99% - 100%)    VSS, NAD, A&O x 3  Groin (right only): hemostatic suture in place; site C/D/I; no bleeding, hematoma, erythema or edema  Card: S1/S2, RRR, no m/g/r  Resp: lungs CTA b/l  Abd: S/NT/ND  Ext: no edema, distal pulses intact    EKG: AS/VS 70s bpm; subtle QRS fractionation II, III, aVF & VT; normal axis & intervals; no acute changes.     Assessment:   70 year old female with a history of HTN, HLD, mild-mod AS, COPD, spinal cord stimulator, normal coronaries by cardiac cath 3/2018, paroxysmal atrial fibrillation s/p ablation (CTI + PVI Dr. Arreguin 11/2019) and seizure disorder who presented with altered mental status. ILR interrogation demonstrated an episode of complete heart block without ventricular escape lasting 9 seconds on 8/7/2020 without suggestion of associated vagal response, concerning for infranodal conduction disease. Now s/p uncomplicated MDT Micra leadless pacemaker implant via right femoral venous access.    Plan:   Port CXR post op - device location grossly stable/appropriate. Official read pending.   Bedrest x 6 hours post implant, then OOB w/ assist & progress as tolerated.    Continued observation on telemetry overnight.   Cont Ancef 2gm IV q 8 hours x 2 additional doses to complete 24 hour course.   Pain control with PO analgesia PRN.   NO HEPARIN OR LOVENOX, INCLUDING PROPHYLACTIC/SUBCUT DOSING, UNTIL OTHERWISE ADVISED BY EP.   Resume Eliquis this PM.   Continue other prior medications.   Groin suture to be removed by EP service in AM.   PA/Lat CXR and device check in AM.   Outpt f/up in 1-2 weeks.  Further management & dispo per primary team.   Will follow.

## 2020-09-04 NOTE — DIETITIAN INITIAL EVALUATION ADULT. - OTHER INFO
Pt with h/o atrial fibrillation, hyperlipidemia, hypertension, lung nodule, osteoarthritis, aortic stenosis, seizure disorder, s/p spinal cord stimulator was BIBEMS as she was found to be confused at home. In ED, concerns for dehydration and UTI.  Pt with apical ischemia, non obstructive CAD.  Pt in cath lab yesterday and again today for PPM placement per RN.  Unable to interview pt at this time.  Aware pt receiving puree/nectar diet with Ensure TID.  Pt mostly NPO x 2 days for cath lab, but noted with poor po intake x1 meal per RN flowsheets.   Pt also noted with poor po intake prior to admission as per H&P.   As per previous nutrition note on prior admission July 2020- pts son had reported pt with 50lb unintentional wt loss x 1 year.  Pts wt in July 2020 was 115 lbs, current admission wt (8/27) 103 lbs and current wt (9/4) 90 lbs.

## 2020-09-04 NOTE — CHART NOTE - TREATMENT: THE FOLLOWING DIET HAS BEEN RECOMMENDED
Diet, Dysphagia 1 Pureed-Nectar Consistency Fluid:   DASH/TLC {Sodium & Cholesteral Restricted}  Supplement Feeding Modality:  Oral  Ensure Enlive Servings Per Day:  3       Frequency:  Three Times a day (09-03-20 @ 12:45) [active]    Continue with Ensure TID and diet consistency per SLP

## 2020-09-05 NOTE — PROGRESS NOTE ADULT - SUBJECTIVE AND OBJECTIVE BOX
Nurse Practitioner Progress note:     INTERVAL HISTORY: 70 year old female with a history of HTN, HLD, mild-mod AS, COPD, spinal cord stimulator, normal coronaries by cardiac cath 3/2018, paroxysmal atrial fibrillation s/p ablation (CTI + PVI Dr. Arreguin 11/2019) and seizure disorder who presented with altered mental status. ILR interrogation demonstrated an episode of complete heart block without ventricular escape lasting 9 seconds on 8/7/2020 without suggestion of associated vagal response, concerning for infranodal conduction disease. Now s/p uncomplicated MDT Micra leadless pacemaker implant via right femoral venous access.    MEDICATIONS:  metoprolol tartrate 25 milliGRAM(s) Oral two times a day  tamsulosin 0.4 milliGRAM(s) Oral at bedtime  acetaminophen    Suspension .. 650 milliGRAM(s) Oral every 6 hours PRN  escitalopram 10 milliGRAM(s) Oral daily  haloperidol    Injectable 0.5 milliGRAM(s) IntraMuscular every 6 hours PRN  levETIRAcetam 1500 milliGRAM(s) Oral two times a day  oxycodone    5 mG/acetaminophen 325 mG 1 Tablet(s) Oral every 12 hours PRN  pregabalin 50 milliGRAM(s) Oral two times a day  pantoprazole    Tablet 40 milliGRAM(s) Oral before breakfast  atorvastatin 10 milliGRAM(s) Oral at bedtime  levothyroxine 112 MICROGram(s) Oral daily  apixaban 5 milliGRAM(s) Oral <User Schedule>  aspirin enteric coated 81 milliGRAM(s) Oral daily      TELEMETRY: NSR tele reviewed    T(C): 36.7 (09-05-20 @ 09:45), Max: 36.7 (09-05-20 @ 09:45)  HR: 68 (09-05-20 @ 09:45) (68 - 86)  BP: 107/63 (09-05-20 @ 09:45) (90/53 - 121/66)  RR: 20 (09-05-20 @ 09:45) (15 - 20)  SpO2: 100% (09-05-20 @ 09:45) (96% - 100%)  Wt(kg): --    PHYSICAL EXAM:  Appearance: Normal	  HEENT:   Normal oral mucosa, PERRL  Cardiovascular: Normal S1 S2, No JVD, No murmurs, No edema  Respiratory: Lungs clear to auscultation	  Psychiatry: A & O x 3, Mood & affect appropriate  Gastrointestinal:  Soft, Non-tender, + BS	  Skin: No rashes, No ecchymoses, No cyanosis  Neurologic: Non-focal, A&O X3.  No neuro deficits  Extremities: Normal range of motion, No clubbing, cyanosis or edema  Vascular: Peripheral pulses palpable 2+ bilaterally  Procedure Site: Right groin suture removed.  Site benign.  No bleeding/hematoma/ecchymosis.  + palp pedal pulse.    12 lead EKG:  	NSR. No acute changes               9.8    7.26  )-----------( 251      ( 05 Sep 2020 06:20 )             32.5     09-05    143  |  104  |  15.0  ----------------------------<  91  5.2   |  29.0  |  0.68    Ca    9.3      05 Sep 2020 06:13      PROCEDURE RESULTS: S/P Micra PPM via RFV pod #1.  Hayley procedure well.    Assessment and Plan:    · Assessment		  Patient presented with AMS likely secondary to seizures vs arrhythmogenic source; ILR interrogated and recording of high grade AV block with 9 seconds pause on 8/7/2020 at 906AM. EP following patient may require PPM prior to discharge. No acute events overnight and no tele events overnight   Patient had nuclear stress testing done which showed mild apical ischemia with preserved LVEF and C unrevealing for any evidence of CAD; s/p MICRA

## 2020-09-05 NOTE — DISCHARGE NOTE NURSING/CASE MANAGEMENT/SOCIAL WORK - PATIENT PORTAL LINK FT
You can access the FollowMyHealth Patient Portal offered by [facility] by registering at the following website: [email or web address]. By joining [employer]’s FollowMyHealth portal, you will also be able to view your health information using other applications (apps) compatible with our system.

## 2020-09-05 NOTE — PROGRESS NOTE ADULT - PROVIDER SPECIALTY LIST ADULT
Cardiology
Electrophysiology
Epilepsy
Hospitalist
Neurology
Cardiology

## 2020-09-05 NOTE — PROGRESS NOTE ADULT - ASSESSMENT
A/P     1. Atypical Chest pain   - EKG reviewed normal sinus rhythm with no acute changes and LHC is unrevealing for CAD   - on ASA and Lipitor   - TTE reviewed shows LVEF 50% with enlarged LA with mild MR mod AS (peak velocity 2.4 m/s, mean gradient 13 mmHg, calculated ANGEL by continuity equation 1.0 cm^2) and mod AR   - Nuclear stress imaging shows mild apical ischemia with preserved LVEF; LHC shows no evidence of CAD   - continue with Lopressor 25mg po q 12hrs    2. Paroxsymal AF on Eliquis   - sinus rhythm  - Eliquis 5mg po q 12hr  - continue BB   - ILR shows evidence of high degree AVB and pauses; s/p MICRA placement with Dr. Barrientos     3. AMS likely secondary to seizures vs arrhythmogenic (tachy -kalani) vs infectious etiology ( less likely)   - at baseline mental status   - CT head negative for any acute intracranial pathology   - Neurology follow up appreciated   - on Keppra and Vimpat   - pending overnight EEG results     4. Numbness of the bilateral lower extremities likely secondary to neuropathy   - no concerns for PAD (pulses palpated bilaterally)   - Vitamin B12 and Folate within normal limits     5. S/P Micra leadless PPM  -device check completed  -CXR with no ptx  -groin suture removed    Pt safe for d/c from a cardiology perspective

## 2020-09-05 NOTE — PROGRESS NOTE ADULT - NUTRITIONAL ASSESSMENT
This patient has been assessed with a concern for Malnutrition and has been determined to have a diagnosis/diagnoses of Severe protein-calorie malnutrition.    This patient is being managed with:   Diet Dysphagia 1 Pureed-Nectar Consistency Fluid-  DASH/TLC {Sodium & Cholesteral Restricted}  Supplement Feeding Modality:  Oral  Ensure Enlive Servings Per Day:  3       Frequency:  Three Times a day  Entered: Sep  3 2020 12:45PM

## 2020-09-05 NOTE — PROGRESS NOTE ADULT - REASON FOR ADMISSION
AMS

## 2020-09-05 NOTE — PROGRESS NOTE ADULT - SUBJECTIVE AND OBJECTIVE BOX
[facility]                                        Neu[facility] at [place]                                 [address]                                  [address]                                        [place], [place], [address]                                             [phone]        CC: Encephalopathy    HPI:   The patient is a 70y Female who presented with AMS.  Per patient she went outside, was not aware that she was naked.  She was seen by a neighbor and was taken to hospital.  She is mostly amnestic of event.  She was seen last month with new diagnosis of seizure.  She denied having had seizures.    Interim history:  Remains on [address] [place].     ROS:   Denies headache or dizziness.  Denies chest pain.  Denies shortness of breath.    MEDICATIONS  (STANDING):  apixaban 5 milliGRAM(s) Oral <User Schedule>  aspirin enteric coated 81 milliGRAM(s) Oral daily  atorvastatin 10 milliGRAM(s) Oral at bedtime  escitalopram 10 milliGRAM(s) Oral daily  levETIRAcetam 1500 milliGRAM(s) Oral two times a day  levothyroxine 112 MICROGram(s) Oral daily  metoprolol tartrate 25 milliGRAM(s) Oral two times a day  pantoprazole    Tablet 40 milliGRAM(s) Oral before breakfast  pregabalin 50 milliGRAM(s) Oral two times a day  tamsulosin 0.4 milliGRAM(s) Oral at bedtime      Vital Signs Last 24 Hrs  T(C): 36.7 (05 Sep 2020 09:45), Max: 36.7 (05 Sep 2020 09:45)  T(F): 98.1 (05 Sep 2020 09:45), Max: 98.1 (05 Sep 2020 09:45)  HR: 68 (05 Sep 2020 09:45) (68 - 86)  BP: 107/63 (05 Sep 2020 09:45) (90/53 - 121/66)  BP(mean): --  RR: 20 (05 Sep 2020 09:45) (15 - 20)  SpO2: 100% (05 Sep 2020 09:45) (96% - 100%)    Detailed Neurologic Exam:    Mental status: The patient is awake and alert. There is no aphasia. There is no dysarthria.     Cranial nerves: Pupils equal and react symmetrically to light. There is no visual field deficit to threat. Extraocular motion is full with no nystagmus. There is no ptosis. Facial sensation is intact. Facial musculature is symmetric. Palate elevates symmetrically. Tongue is midline.    Motor: There is normal bulk and tone.  There is no tremor.  Strength grossly 5/5 bilaterally.    Sensation: Grossly intact to light touch and pin.    Reflexes: 2+ throughout and plantar responses are flexor.    Cerebellar: No dysmetria on finger nose testing.    Labs:     09-05    143  |  104  |  15.0  ----------------------------<  91  5.2   |  29.0  |  0.68    Ca    9.3      05 Sep 2020 06:13                              9.8    7.26  )-----------( 251      ( 05 Sep 2020 06:20 )             32.5

## 2020-09-05 NOTE — PROGRESS NOTE ADULT - ASSESSMENT
70y Female who is followed by neurology because of ams.  Now resolved.     AMS  Resolved  Possible seizure vs infection, UA did not suggest UTI.  Epilepsy service following   Continue Keppra 1500 mg bid, Vimpat 100 mg bid.    Chest pain  Resolved.  A fib. On Eliquis.     Discharge planning.   Stable from neurologic standpoint.     Will be available as needed.

## 2020-09-08 PROBLEM — R56.9 UNSPECIFIED CONVULSIONS: Chronic | Status: ACTIVE | Noted: 2020-01-01

## 2020-09-10 PROBLEM — G62.9 POLYNEUROPATHY: Status: ACTIVE | Noted: 2020-01-01

## 2020-09-10 NOTE — ASSESSMENT
[FreeTextEntry1] : This patient has a history of parasthesiae that are progressive as well as weakness on exam more than subjectively reported.  \par \par The areflexia and normal tone with lack of atrophy speaks against MND; one of the inflammatory neuropathies is in the DDx.  \par \par Will start with neuropathy labs and have her back for EMG.

## 2020-09-10 NOTE — PHYSICAL EXAM
[Person] : oriented to person [Place] : oriented to place [Time] : oriented to time [Short Term Intact] : short term memory intact [Remote Intact] : remote memory intact [Registration Intact] : recent registration memory intact [Concentration Intact] : normal concentrating ability [Visual Intact] : visual attention was ~T not ~L decreased [Naming Objects] : no difficulty naming common objects [Repeating Phrases] : no difficulty repeating a phrase [Writing A Sentence] : no difficulty writing a sentence [Fluency] : fluency intact [Comprehension] : comprehension intact [Reading] : reading intact [Past History] : adequate knowledge of personal past history [Cranial Nerves Optic (II)] : visual acuity intact bilaterally,  visual fields full to confrontation, pupils equal round and reactive to light [Cranial Nerves Oculomotor (III)] : extraocular motion intact [Cranial Nerves Trigeminal (V)] : facial sensation intact symmetrically [Cranial Nerves Facial (VII)] : face symmetrical [Cranial Nerves Vestibulocochlear (VIII)] : hearing was intact bilaterally [Cranial Nerves Glossopharyngeal (IX)] : tongue and palate midline [Cranial Nerves Hypoglossal (XII)] : there was no tongue deviation with protrusion [Cranial Nerves Accessory (XI - Cranial And Spinal)] : head turning and shoulder shrug symmetric [Motor Tone] : muscle tone was normal in all four extremities [Motor Handedness Left-Handed] : the patient is left hand dominant [No Muscle Atrophy] : normal bulk in all four extremities [4] : arm flexion 4/5 [Hand Weakness Right] : normal hand  [Hand Weakness Left] : normal hand  [+4] : knee flexion +4/5 [5] : ankle inversion 5/5 [Sensation Tactile Decrease] : light touch was intact [Abnormal Walk] : normal gait [Past-pointing] : there was no past-pointing [Tremor] : no tremor present [Balance] : balance was intact [0] : Patella left 0 [1+] : Ankle jerk left 1+ [Plantar Reflex Right Only] : normal on the right [Plantar Reflex Left Only] : normal on the left [FreeTextEntry6] : bilateral pes cavus [Neck Appearance] : the appearance of the neck was normal [Neck Cervical Mass (___cm)] : no neck mass was observed [Jugular Venous Distention Increased] : there was no jugular-venous distention [Thyroid Diffuse Enlargement] : the thyroid was not enlarged [Thyroid Nodule] : there were no palpable thyroid nodules [Auscultation Breath Sounds / Voice Sounds] : lungs were clear to auscultation bilaterally [] : no respiratory distress [Heart Sounds] : normal S1 and S2 [Heart Rate And Rhythm] : heart rate was normal and rhythm regular [Murmurs] : no murmurs [Heart Sounds Pericardial Friction Rub] : no pericardial rub [Heart Sounds Gallop] : no gallops

## 2020-09-16 NOTE — ED PROVIDER NOTE - OBJECTIVE STATEMENT
Pt is a 71yo F presenting from home. Patient had been having recent falls and was found on the bathroom floor. Patient at [facility] actively having L sided seizures with R gaze preference. Seizures subsided without medications and patient able to endorse her name. Trauma B called on patient for GCS 14 and mechanism of fall with unknown downtime.

## 2020-09-16 NOTE — ED PROVIDER NOTE - PATIENT PORTAL LINK FT
You can access the FollowMyHealth Patient Portal offered by [facility] by registering at the following website: [email or web address]. By joining [employer]’s FollowMyHealth portal, you will also be able to view your health information using other applications (apps) compatible with our system.

## 2020-09-16 NOTE — SBIRT NOTE ADULT - NSSBIRTDRGBRIEFINTDET_GEN_A_CORE
Provided SBIRT services: Full screen positive. Brief Intervention Performed. Screening results were reviewed with the patient and patient was provided information about healthy guidelines and potential negative consequences associated with level of risk. Motivation and readiness to reduce or stop use was discussed and goals and activities to make changes were suggested/offered.   Provided SBIRT services: Full screen positive. Brief Intervention Performed. Screening results were reviewed with the patient and patient was provided information about healthy guidelines and potential negative consequences associated with level of risk. Motivation and readiness to reduce or stop use was discussed and goals and activities to make changes were suggested/offered.  Patient enrolled/consented to [employer] external care navigation program.

## 2020-09-16 NOTE — ED ADULT NURSE REASSESSMENT NOTE - NS ED NURSE REASSESS COMMENT FT1
received pt from critical care. pt was code trauma b s/p fall on eliquis and hit head. pt a&ox3 at this time. c collar in place. ST on CM. resp spontaneous, even and unlabored, lungs clear, skin warm and dry, color appropriate for race. abd soft, non tender, non distended,  + bs. GUSTAFSON x4, sensation intact, no BLE edema. denies numbness/tingling. ecchymosis noted to left eye. denies vision changes. pending radiology result. updated on plan of care, verbalize understanding. call bell in reach

## 2020-09-16 NOTE — ED PROVIDER NOTE - PHYSICAL EXAMINATION
General: Ill appearing female  HEENT: Ecchymosis to L eyebrow and cheekbone. Oropharynx clear. No blood in oropharynx.   Eyes: R gaze preference.   Neck: Placed in C collar.   Cardiac: Regular rate and regular rhythm. No murmurs, rubs, gallops.   Resp: Slurred speech. Lungs CTAB. No wheezes, rales or rhonchi.  Abd: Soft, non-tender, non-distended.   Back: Spine midline and non-tender. No CVA tenderness.    Skin: Mastectomy surgical scar on L breast. No rashes, abrasions, or lacerations.  Neuro: AO x 1. Patient moving all extremities spontaneously.

## 2020-09-16 NOTE — SBIRT NOTE ADULT - NSSBIRTDRGPASSREFTXDET_GEN_A_CORE
Patient interested in detox tx; discussed [facility]. Currently pending COVID result for case presentation to [facility]. [facility] # [phone], Dr. Roy Detox Doc on-call until 7pm, Dr. Pal 7pm-on.

## 2020-09-16 NOTE — ED PROVIDER NOTE - PROGRESS NOTE DETAILS
Spoke with patient's son. He requests that social work discuss rehab and detox options for patient prior to discharge. Endorses that patient is a chronic drug user. States that he can and will take patient home when she is cleared for discharge. SBIRT consult completed. Patient interested in detox and to be sent to [facility] for Detox once COVID resulted. Spoke with son who agrees with plan. d/w DR. Pal @ [facility]. Will accept pt for detox. - Isaiah Adorno, PGY-2

## 2020-09-16 NOTE — ED ADULT TRIAGE NOTE - CHIEF COMPLAINT QUOTE
As per EMS "pt found on bathroom floor after having multiple falls over the last few days", pt on Eliquis, pt with focal seizure upon arrival, pt with ecchymosis noted to left eye, trauma B initiated

## 2020-09-16 NOTE — ED PROVIDER NOTE - CARE PLAN
Principal Discharge DX:	Polysubstance abuse  Secondary Diagnosis:	Breakthrough seizure  Secondary Diagnosis:	Closed head injury

## 2020-09-16 NOTE — ED ADULT NURSE REASSESSMENT NOTE - NS ED NURSE REASSESS COMMENT FT1
pt asleep, arouses to voice. a&ox3. urine sample provided and sent to lab as rxd. denies any pain/discomfort. pending dispo. updated on plan of care, verbalize understanding. call bell in reach

## 2020-09-16 NOTE — ED ADULT NURSE REASSESSMENT NOTE - NS ED NURSE REASSESS COMMENT FT1
Received pt from [facility] RN. Pt is came from trauma s/p fall at home. Pt is currently laying in bed AAOx3 but occasionally confused. Pending COVID swab results for [facility] transfer.

## 2020-09-16 NOTE — ED PROVIDER NOTE - ATTENDING CONTRIBUTION TO CARE
I personally saw the patient with the resident, and completed the key components of the history and physical exam. I then discussed the management plan with the resident.      69 yo female pmh drug abuse, seizure disorder on keppra with seizure and head injury while in bathroom; pt noted on eliquis for atrial fibrillation; code trauma b called;   pt with bruising over rt eye from previous fall;    pe awake with rt gaze  heent  bruise over rt frontal no step off; neck in collar chest nontender clear abd soft nontender;   ext no deformity; neuro  moves all extremities; dx seizure disorder, head injury  orders as per trauma;

## 2020-09-16 NOTE — ED ADULT NURSE REASSESSMENT NOTE - NS ED NURSE REASSESS COMMENT FT1
received pt from critical care. pt a&ox3, denies any pain/discomfort. resp spontaneous, even and unlabored, lungs clear, skin warm and dry. + BLE edema. NSR on cardiac monitor. urinal provided. o2 2l nc per baseline. pending further tx plan. updated on plan of care, verbalize understanding. call bell in reach

## 2020-09-22 NOTE — ED STATDOCS - NS ED SCRIBE STATEMENT
Attending Rituxan Counseling:  I discussed with the patient the risks of Rituxan infusions. Side effects can include infusion reactions, severe drug rashes including mucocutaneous reactions, reactivation of latent hepatitis and other infections and rarely progressive multifocal leukoencephalopathy.  All of the patient's questions and concerns were addressed.

## 2020-10-14 NOTE — H&P ADULT - NSHPSOURCEINFORD_GEN_ALL_CORE
How Severe Is Your Dry Skin?: moderate Additional History: Tries Aveeno or jergens moisturizer daily Physician/Provider/Patient

## 2020-12-04 ENCOUNTER — APPOINTMENT (OUTPATIENT)
Dept: NEUROLOGY | Facility: CLINIC | Age: 70
End: 2020-12-04

## 2020-12-23 PROBLEM — Z87.09 HISTORY OF SORE THROAT: Status: RESOLVED | Noted: 2020-01-01 | Resolved: 2020-12-23

## 2022-04-11 PROBLEM — Z11.59 SCREENING FOR VIRAL DISEASE: Status: ACTIVE | Noted: 2020-01-01

## 2022-05-18 NOTE — DIETITIAN INITIAL EVALUATION ADULT. - NS FNS WEIGHT CHANGE REASON
Impression: Age-related nuclear cataract, bilateral: H25.13. Plan: Cataracts account for the patient's complaints. No treatment currently recommended. The patient will monitor vision changes and contact us with any decrease in vision. unintentional

## 2022-07-21 NOTE — ED ADULT NURSE REASSESSMENT NOTE - NS ED NURSE REASSESS COMMENT FT1
patient upset states that she has been here for >3 hours, has seen a MD on days and on nights, patient aware of change of shift, labs and medication given, tolerated well, wants to speak to patient advocate, spoke with charge, emotional support given by staff POC discussed awaiting labs an UA results for further testing if needed, MDA 96

## 2023-02-14 NOTE — ED PROVIDER NOTE - GASTROINTESTINAL [+], MLM
CC:  Stephanie Dean is here today for Physical,F/U HTN,Asthma,Pain.    Medications: medications verified and updated  Refills needed today?  NO  denies Latex allergy or sensitivity  Patient would like communication of their results via:      Cell Phone:   Telephone Information:   Mobile [phone]     Okay to leave a message containing results? Yes   Reviewed overdue health maintenance topics with patient.    Health Maintenance Due   Topic Date Due   • Shingles Vaccine (1 of 2) Never done   • Influenza Vaccine (1) 09/01/2022   • COVID-19 Vaccine (4 - Booster for Pfizer series) 09/19/2022   • Breast Cancer Screening  10/12/2022       Patient is due for the topics as listed above and wishes to proceed with them. Orders placed for Immunization(s) Influenza and Mammogram.    Recent PHQ 2/9 Score    PHQ 2:  Date Adult PHQ 2 Score Adult PHQ 2 Interpretation   2/14/2023 1 No further screening needed       PHQ 9:     PHQ-2/9 Depression Screening  Little interest or pleasure in activity?: Not at all  Feeling down, depressed or hopeless?: Several days  Initial depression screening score:: 1  PHQ2 Interpretation: No further screening needed                flank pain (+) flank pain

## 2023-03-16 NOTE — DISCHARGE NOTE NURSING/CASE MANAGEMENT/SOCIAL WORK - NSDCPEELIQUISDIET_GEN_ALL_CORE
Post op check  Emerging from anesthesia  Moving all extremities at baseline  Wound: CDI  A/P Doing well         Mobilize    I spoke with the family Eat healthy foods you enjoy. Apixaban/Eliquis DOES NOT have a special diet. Limit your alcohol intake.

## 2023-06-05 NOTE — ED ADULT NURSE NOTE - CHPI ED NUR SYMPTOMS NEG
GYN Problem Visit Note    Chief Complaint   Procedure   No LMP recorded. Naya Cotton is a 38 y.o. female who complains of   Chief Complaint   Patient presents with    Procedure     Pt presents for follow-up for PMB. She had blood work confirming menopause last year. She was having severe hot flashes and was started on hrt. She then began having infrequent but heavy episodes of bleeding. Her progesterone was increased and she reports no bleeding since. Per Nursing Note:  Natalia Cotton is a 38 y.o. female presents for a problem visit. No chief complaint on file. No LMP recorded. Birth Control: none. Last Pap: normal obtained 4/2023       Past Medical History:   Diagnosis Date    AR (allergic rhinitis)     Basal cell carcinoma of face 2002    left hairline    DUB (dysfunctional uterine bleeding) 2019    Dr. Reyes Mon    Elevated HDL     Infertility management      w dysmotility. she had IVF. Knee pain, bilateral     softball injuries    Miscarriage 12/2012    Pap smear for cervical cancer screening 11/20/13 neg HPV NEG 12/28/18 neg HPV NEG    Recurrent UTI 6/3/2013     Past Surgical History:   Procedure Laterality Date    MALIGNANT SKIN LESION EXCISION  2002    basal cell     Social History     Occupational History    Not on file   Tobacco Use    Smoking status: Never    Smokeless tobacco: Never   Substance and Sexual Activity    Alcohol use:  Yes     Alcohol/week: 0.8 standard drinks    Drug use: No    Sexual activity: Not on file     Family History   Problem Relation Age of Onset    Hypertension Paternal Grandmother     Diabetes Paternal Grandmother     Hypertension Maternal Grandmother     Diabetes Maternal Grandmother     Cancer Paternal Grandfather         cancer, unknown origin    Cancer Father 48        pancreatic, dx 2/2009       Allergies   Allergen Reactions    Latex      Other reaction(s): Unknown (comments)     Prior to Admission no weakness/no vomiting/no fever

## 2023-06-15 NOTE — ED STATDOCS - PROGRESS NOTE DETAILS
[facility] ambulatory encounter  FAMILY PRACTICE OFFICE VISIT    CHIEF COMPLAINT:    Chief Complaint   Patient presents with   • Medicare Wellness Visit   • Cerumen Impaction       SUBJECTIVE  Gerald H Goltz is a 80 year old male who presented for cerumen impaction.    L ear - diminished hearing on L ear for last 2 months. Has a constant \"buzzing\" in the ear. Denies pain or drainage. Does clean his ears.        Review of systems:    See HPI       PAST HISTORIES:  I have reviewed the past medical history, family history, social history, medications and allergies listed in the medical record as obtained by my nursing staff and support staff and agree with their documentation.      OBJECTIVE:    Physical Exam:      General: alert, appears comfortable, no acute distress  Head: NC, AT  ENT: anicteric, moist mucosa, no nasal discharge   Ear: severe cerumen impaction in both canals, close to 100% in L ear. No drainage, erythema visualized.  Neck: trachea midline, neck supple  Resp: b/l chest rise, able to converse in full sentences, no respiratory distress  Abd: non-distended.  Skin: no rashes, lesions.  Extremities: moves b/l LE and UE spontaneously  Neuro: no focal deficits, no tremors, no epileptic activity  Psych: appropriate mood and affect, no hallucinations.       Assessment and plan:   Christian was seen today for medicare wellness visit and cerumen impaction.    Diagnoses and all orders for this visit:    Medicare annual wellness visit, subsequent  -      - Subsequent Medicare Wellness Visit - Select if billing add'l E/M    Bilateral impacted cerumen  -new  -unable to soften w/ water irrigation, attempting removal w/ curette would be unlikely to be successful.  -will refer to ENT.  • SERVICE TO ENT              Health Maintenance Due   Topic Date Due   • COVID-19 Vaccine (3 - Booster for Moderna series) 09/15/2022   • Medicare Advantage- Medicare Wellness Visit  01/01/2023   • Depression Screening  05/19/2023       Return in about 1 year (around 6/15/2024) for Medicare Wellness Visit.    This patient was discussed with Dr. Rich.    Kate Hendricks DO  Family Medicine, PGY-3        MEDICARE WELLNESS VISIT NOTE    HISTORY OF PRESENT ILLNESS:   Gerald H Goltz presents for his Subsequent Annual Medicare Wellness Visit.   He has no current complaints or concerns.      Patient Care Team:  Jamari Gleason DO as PCP - General (Student)  Omi Lockett MD (Dermatology)  Mckay Rodgers MD (Pulmonary Medicine)        Patient Active Problem List   Diagnosis   • Alcohol abuse   • Allergic rhinitis   • ED (erectile dysfunction)   • Hypercholesterolemia   • Prostatism   • Former smoker   • COPD mod-severe, FEV1 58% by PFTs 11/12   • Insomnia, unspecified   • Actinic keratoses, R auricle   • Compulsive behavior   • HTN (hypertension)   • Acne rosacea, on intermittent metrogel   • Coronary artery calcification seen on CAT scan   • ASCVD (arteriosclerotic cardiovascular disease) - 22.9% on 6/24/21         Past Medical History:   Diagnosis Date   • COPD (chronic obstructive pulmonary disease) (CMD) 12/2014    moderate-severe per Dr. Oliver notes   • DJD (degenerative joint disease)    • BETH (dyspnea on exertion) 5/8/2013    per Dr. Rodgers notes   • ED (erectile dysfunction)    • H/O ETOH abuse 5/2013    binge drinks per Dr. Rodgers notes   • Hyperlipidemia    • Insomnia    • Wears dentures    • Wears glasses          Past Surgical History:   Procedure Laterality Date   • Colonoscopy  2009    x3 total   • Ct colonography  2009    newest proc. tried   • Tonsillectomy      as a child         Social History     Tobacco Use   • Smoking status: Former     Current packs/day: 0.00     Average packs/day: 1 pack/day for 40.0 years (40.0 pk-yrs)     Types: Cigars, Cigarettes     Start date: 1/1/1968     Quit date: 1/1/2008     Years since quitting: 15.4   • Smokeless tobacco: Never   Substance Use Topics   • Alcohol use: Yes     Alcohol/week: 20.0  standard drinks of alcohol     Types: 10 Cans of beer, 10 Shots of liquor per week   • Drug use: No     Drug use:    Drug Use:    No              Family History   Problem Relation Age of Onset   • Heart disease Mother    • Stroke Mother    • Cancer Father         lung   • Dementia/Alzheimers Sister        Current Outpatient Medications   Medication Sig Dispense Refill   • mirtazapine (REMERON) 15 MG tablet TAKE 1 TABLET BY MOUTH AT BEDTIME 90 tablet 0   • rosuvastatin (CRESTOR) 20 MG tablet TAKE 1 TABLET BY MOUTH EVERY DAY 90 tablet 3   • ASA-APAP-Caff Buffered 227-194-33 MG Tab Take 81 mg by mouth daily.     • albuterol (Ventolin HFA) 108 (90 Base) MCG/ACT inhaler Inhale 2 puffs into the lungs every 4 hours as needed for Shortness of Breath or Wheezing. 18 g 5   • fluticasone-umeclidin-vilanterol (Trelegy Ellipta) 100-62.5-25 MCG/ACT inhaler Inhale 1 puff into the lungs daily. 180 each 3   • fluorouracil (Efudex) 5 % cream Apply twice daily for a total of 3 weeks. To area of actinic keratosis on scalp 40 g 0   • tamsulosin (FLOMAX) 0.4 MG Cap Take 1 capsule by mouth daily. 90 capsule 3   • metroNIDAZOLE (MetroGEL) 0.75 % gel APPLY TO FACE DAILY FOR ROSACEA 45 g 1   • Coenzyme Q10-levOCARNitine (CO Q-10 PLUS PO) Take one tablet once daily by mouth     • Probiotic Product (Probiotic Advanced) Cap Takes one once daily by mouth     • ASPIRIN LOW DOSE 81 MG EC tablet TAKE 1 TABLET BY MOUTH DAILY (Patient taking differently: Taking every other day by mouth) 90 tablet 1   • Oxygen 20-80 % Kit Inhale 0.5 L/min into the lungs nightly. Indications: COPD     • Multiple Vitamin (MULTI-VITAMIN DAILY PO) Take  by mouth.       No current facility-administered medications for this visit.        The following items on the Medicare Health Risk Assessment were found to be positive  1.) Do you have an Advance directive, living will, or power of  for health care document that contains your wishes for end of life care?: I don't  know     3.) During the past 4 weeks, how would you rate your health?: Fair     4.) During the past 4 weeks, what was the hardest physical activity you could do for at least 2 minutes?: Light     6 d.) How many servings of Sugar Sweetened Beverages do you have each day ( 1 serving = 1 can or 12 oz cup of sode or juice): 3 per day     7c.) Do you worry about falling?: Yes     11b.) Bowel control problems: Sometimes     11h.) Problems with your hearing: Always         Vision and Hearing screens: no vision changes, hearing worsening but does have cerumen impaction.    Advance care planning documents on file - no     Cognitive/Functional Status: no evidence of cognitive dysfunction by direct observation    Opioid Review: Christian is not taking opioid medications.    Recent PHQ 2/9 Score:    PHQ 2:  Date Adult PHQ 2 Score Adult PHQ 2 Interpretation   6/15/2023 0 No further screening needed       PHQ 9:  Date Adult PHQ 9 Score Adult PHQ 9 Interpretation   6/15/2023 3 Minimal Depression       DEPRESSION ASSESSMENT/PLAN:  Depression screening is negative no further plan needed.     Body mass index is 25.56 kg/m².    BMI ASSESSMENT/PLAN:  Patient is overweight.    Low carbohydrate diet        Needed Screening/Treatment:   CMP  Needed follow up:  None    See orders.   See Patient Instructions section.   Return in about 1 year (around 6/15/2024) for Medicare Wellness Visit.     Pt was discussed with Dr. Rich and they agree with plan.    Kate Hendricks, DO  Family Medicine, PGY-3        RON Pack NOTE: Pt evaluated at bedside.. Pt evaluated prior by intake physician. Otherwise HPI/PE/ROS as noted above. Will follow up plan per intake physician. RON Pack NOTE: I-stop reference #: [id number], last Rx was 08/01/2019, Hydrocodone-acetaminophen 7.5-325 mg tablet, 15 tabs, Rusty Rodriguez Reports improvement in pain s/p meds, discussed return precautions. Will send Rx for percocet and advise f/u with Dr. Ponce and her PMD. RON Pack NOTE: I-stop reference #: [id number], last Rx was 08/01/2019, Hydrocodone-acetaminophen 7.5-325 mg tablet, 15 tabs, Rusty Rodriguez Reviewed XR with Dr. Mai. Reports improvement in pain s/p meds, discussed return precautions. Will send Rx for percocet and advise f/u with Dr. Ponce and her PMD.

## 2023-07-11 NOTE — ED ADULT TRIAGE NOTE - NS ED NURSE BANDS TYPE
Chart reviewed for medication refill protocol.   Medication(s) requested: Nebivolol 10 mg Take 1 tablet twice daily  Last OV: 3/16/23 with Dr. Haley   Upcoming OV: Not scheduled; due back around Nov 2023; on wait list.   Medication signed per protocol.    
Name band;

## 2023-07-27 NOTE — ED STATDOCS - CHPI ED AGGRAVATING FACTORS
nothing Picato Pregnancy And Lactation Text: This medication is Pregnancy Category C. It is unknown if this medication is excreted in breast milk.

## 2024-03-11 NOTE — CONSULT NOTE ADULT - CONSULT REASON
Department of Anesthesiology  Preprocedure Note       Name:  Alison Castaneda   Age:  45 y.o.  :  1978                                          MRN:  [id number]         Date:  3/11/2024      Surgeon: Surgeon(s):  Kirstin Coon MD    Procedure: Procedure(s):  ESOPHAGOGASTRODUODENOSCOPY    Medications prior to admission:   Prior to Admission medications    Medication Sig Start Date End Date Taking? Authorizing Provider   megestrol (MEGACE) 40 MG/ML suspension  23   Jagdeep Tiwari MD   methylPREDNISolone (MEDROL, OLU,) 4 MG tablet Take 6 tablets on day 1 Take 5 tablets on day 2 Take 4 tablets on day 3 Take 3 tablets on day 4 Take 2 tablets on day 5 Take 1 tablet on day 6 23   Luis Carlos Ann DO   Magic Mouthwash (MIRACLE MOUTHWASH) Swish and spit 5 mLs 4 times daily as needed for Irritation 1 part viscous lidocaine 2% 60 mL, 1 part diphenhydramine 12.5 mg per 5 mL 60 mL, 1 part Maalox plain or GEQ 60 mL. 23   Khanh Hurst MD   potassium chloride (KLOR-CON M) 10 MEQ extended release tablet Take 1 tablet by mouth 3 times daily 23   Luis Carlos Ann DO   oxyCODONE-acetaminophen (PERCOCET)  MG per tablet Take 1 tablet by mouth every 6 hours as needed for Pain.    Jagdeep Tiwari MD   sucralfate (CARAFATE) 1 GM tablet Take 1 tablet by mouth 2 times daily    Jagdeep Tiwari MD   metoprolol succinate (TOPROL XL) 25 MG extended release tablet Take 1 tablet by mouth daily    Jagdeep Tiwari MD   vitamin D (CHOLECALCIFEROL) 125 MCG (5000 UT) CAPS capsule Take 1 capsule by mouth daily    Jagdeep Tiwari MD   hydroxychloroquine (PLAQUENIL) 200 MG tablet Take 1 tablet by mouth 2 times daily 23   Philippe Gutierrez DO   mycophenolate (CELLCEPT) 500 MG tablet Take 1 tablet by mouth 2 times daily 23   Philippe Gutierrez DO   folic acid (FOLVITE) 1 MG tablet Take 1 tablet by mouth daily 23   Philippe Gutierrez DO   pantoprazole (PROTONIX) 40 MG 
AF

## 2024-04-22 NOTE — ED PROVIDER NOTE - RESPIRATORY NEGATIVE STATEMENT, MLM
What Type Of Note Output Would You Prefer (Optional)?: Standard Output
How Severe Are Your Spot(S)?: mild
Have Your Spot(S) Been Treated In The Past?: has not been treated
Hpi Title: Evaluation of Skin Lesions
no chest pain, no cough, and no shortness of breath.

## 2025-02-11 NOTE — ED ADULT TRIAGE NOTE - BSA (M2)
What Type Of Note Output Would You Prefer (Optional)?: Bullet Format
Hpi Title: Evaluation of Skin Lesions
Have Your Spot(S) Been Treated In The Past?: has not been treated
Additional History: Pt complains of several bothersome skin tags and rough spots that he would like removed or treated with LN2
1.64

## 2025-03-11 NOTE — ASSESSMENT
[FreeTextEntry1] : 69 y/o F evaluated by telemedicine \par \par Skin rash> Scabies? Bed Bugs?\par -Resolved\par \par  sciatic pain in left LE:\par -On Gabapentin 400mg tid.\par -Percocet bid prn. refill\par -Seen by Pain Management> Dr Chahal> pending Lumbar CT\par -refused PT referral.\par \par Unintentional weight loss/ BM changes:\par -Pending Colonoscopy> FOBT: negative 01/20\par -Oncology evaluation advise (Hx Breast Cancer in remission)\par \par COPD/ Hx Pulmonary Nodules?> Opacities in RTLL and LeftLL\par -F/up with Pulminology, Dr Carter\par -Last CT LDCT 02/20/18\par \par Neck pain \par -Pt under works comp case.\par -Seen by Neurosx, Dr Rusty Ponce in [facility].\par -S/P Neurostimulator\par -On Neurontin 300mg tid.\par \par Immunizations:\par -Flu and Prevnar : up to date\par -Shingrix : on hold\par \par HCM:\par -6/20/19\par \par Mammo: 2019\par \par Gyn: 2019\par \par Colonoscopy: needs to reschedule\par \par Hx Breast Cancer in remission;\par -f/up w/ oncology Dr Grady.\par \par  Osteoporosis:\par -On Ibandronate mthly.\par \par A.Fib:\par -Cardiology f/up: Dr Aragon/ [place]\par -on flecainide and metoprolol\par -On Eliquis\par \par Hypothyroidism:\par -on synthroid 112mcg daily\par -TFT : 03/20\par \par Depression:\par -On lexapro 10mg.\par -Doing well.\par \par Right facial numbness> TIA\par -Seen by neurology , Dr Ng.\par -CT scan normal.\par -Normal Doppler\par \par Hx Shingles with Neuropathy:\par -Use Lidoderm patch prn.\par \par  \par \par  \par  Initial (On Arrival)

## 2025-04-14 NOTE — ED ADULT NURSE NOTE - CAS DISCH BELONGINGS RETURNED
Continues to yell and scream, paranoia, manic behavior. Advised patient to lower his voice and to lay down that we have other patients here in the emergency room. Continues to ask for antibiotic ointment for his lips because they are chapped. Lips upon exam are wnl. Patient trying to explain that we here as ER staff along with others last night were trying to kill him. Attempts to redirect patient are minimally successful. Will continue to monitor situation.    Not applicable